# Patient Record
Sex: FEMALE | Race: WHITE | NOT HISPANIC OR LATINO | Employment: OTHER | URBAN - METROPOLITAN AREA
[De-identification: names, ages, dates, MRNs, and addresses within clinical notes are randomized per-mention and may not be internally consistent; named-entity substitution may affect disease eponyms.]

---

## 2023-06-14 LAB
ALANINE AMINOTRANSFERASE (SGPT) (U/L) IN SER/PLAS: 12 U/L (ref 7–45)
ALBUMIN (G/DL) IN SER/PLAS: 4.2 G/DL (ref 3.4–5)
ALBUMIN (MG/L) IN URINE: 13.9 MG/L
ALBUMIN/CREATININE (UG/MG) IN URINE: 16.6 UG/MG CRT (ref 0–30)
ALKALINE PHOSPHATASE (U/L) IN SER/PLAS: 53 U/L (ref 33–136)
ANION GAP IN SER/PLAS: 17 MMOL/L (ref 10–20)
APPEARANCE, URINE: CLEAR
ASPARTATE AMINOTRANSFERASE (SGOT) (U/L) IN SER/PLAS: 14 U/L (ref 9–39)
BASOPHILS (10*3/UL) IN BLOOD BY AUTOMATED COUNT: 0.04 X10E9/L (ref 0–0.1)
BASOPHILS/100 LEUKOCYTES IN BLOOD BY AUTOMATED COUNT: 0.5 % (ref 0–2)
BILIRUBIN TOTAL (MG/DL) IN SER/PLAS: 0.4 MG/DL (ref 0–1.2)
BILIRUBIN, URINE: NEGATIVE
BLOOD, URINE: NEGATIVE
CALCIDIOL (25 OH VITAMIN D3) (NG/ML) IN SER/PLAS: 38 NG/ML
CALCIUM (MG/DL) IN SER/PLAS: 9.5 MG/DL (ref 8.6–10.3)
CARBON DIOXIDE, TOTAL (MMOL/L) IN SER/PLAS: 25 MMOL/L (ref 21–32)
CHLORIDE (MMOL/L) IN SER/PLAS: 105 MMOL/L (ref 98–107)
CHOLESTEROL (MG/DL) IN SER/PLAS: 155 MG/DL (ref 0–199)
CHOLESTEROL IN HDL (MG/DL) IN SER/PLAS: 73.1 MG/DL
CHOLESTEROL/HDL RATIO: 2.1
COBALAMIN (VITAMIN B12) (PG/ML) IN SER/PLAS: 1085 PG/ML (ref 211–911)
COLOR, URINE: YELLOW
CREATININE (MG/DL) IN SER/PLAS: 0.96 MG/DL (ref 0.5–1.05)
CREATININE (MG/DL) IN URINE: 83.9 MG/DL (ref 20–320)
CREATININE (MG/DL) IN URINE: 83.9 MG/DL (ref 20–320)
EOSINOPHILS (10*3/UL) IN BLOOD BY AUTOMATED COUNT: 0.18 X10E9/L (ref 0–0.4)
EOSINOPHILS/100 LEUKOCYTES IN BLOOD BY AUTOMATED COUNT: 2.1 % (ref 0–6)
ERYTHROCYTE DISTRIBUTION WIDTH (RATIO) BY AUTOMATED COUNT: 13.7 % (ref 11.5–14.5)
ERYTHROCYTE MEAN CORPUSCULAR HEMOGLOBIN CONCENTRATION (G/DL) BY AUTOMATED: 32.8 G/DL (ref 32–36)
ERYTHROCYTE MEAN CORPUSCULAR VOLUME (FL) BY AUTOMATED COUNT: 94 FL (ref 80–100)
ERYTHROCYTES (10*6/UL) IN BLOOD BY AUTOMATED COUNT: 4.29 X10E12/L (ref 4–5.2)
ESTIMATED AVERAGE GLUCOSE FOR HBA1C: 200 MG/DL
GFR FEMALE: 61 ML/MIN/1.73M2
GLUCOSE (MG/DL) IN SER/PLAS: 152 MG/DL (ref 74–99)
GLUCOSE, URINE: NEGATIVE MG/DL
HEMATOCRIT (%) IN BLOOD BY AUTOMATED COUNT: 40.2 % (ref 36–46)
HEMOGLOBIN (G/DL) IN BLOOD: 13.2 G/DL (ref 12–16)
HEMOGLOBIN A1C/HEMOGLOBIN TOTAL IN BLOOD: 8.6 %
HYALINE CASTS, URINE: ABNORMAL /LPF
IMMATURE GRANULOCYTES/100 LEUKOCYTES IN BLOOD BY AUTOMATED COUNT: 0.1 % (ref 0–0.9)
KETONES, URINE: NEGATIVE MG/DL
LDL: 67 MG/DL (ref 0–99)
LEUKOCYTE ESTERASE, URINE: ABNORMAL
LEUKOCYTES (10*3/UL) IN BLOOD BY AUTOMATED COUNT: 8.7 X10E9/L (ref 4.4–11.3)
LYMPHOCYTES (10*3/UL) IN BLOOD BY AUTOMATED COUNT: 2.22 X10E9/L (ref 0.8–3)
LYMPHOCYTES/100 LEUKOCYTES IN BLOOD BY AUTOMATED COUNT: 25.7 % (ref 13–44)
MAGNESIUM (MG/DL) IN SER/PLAS: 1.91 MG/DL (ref 1.6–2.4)
MONOCYTES (10*3/UL) IN BLOOD BY AUTOMATED COUNT: 0.68 X10E9/L (ref 0.05–0.8)
MONOCYTES/100 LEUKOCYTES IN BLOOD BY AUTOMATED COUNT: 7.9 % (ref 2–10)
MUCUS, URINE: ABNORMAL /LPF
NEUTROPHILS (10*3/UL) IN BLOOD BY AUTOMATED COUNT: 5.52 X10E9/L (ref 1.6–5.5)
NEUTROPHILS/100 LEUKOCYTES IN BLOOD BY AUTOMATED COUNT: 63.7 % (ref 40–80)
NITRITE, URINE: NEGATIVE
PH, URINE: 6 (ref 5–8)
PLATELETS (10*3/UL) IN BLOOD AUTOMATED COUNT: 246 X10E9/L (ref 150–450)
POTASSIUM (MMOL/L) IN SER/PLAS: 4.7 MMOL/L (ref 3.5–5.3)
PROTEIN (MG/DL) IN URINE: 16 MG/DL (ref 5–24)
PROTEIN TOTAL: 7.4 G/DL (ref 6.4–8.2)
PROTEIN, URINE: NEGATIVE MG/DL
PROTEIN/CREATININE (MG/MG) IN URINE: 0.19 MG/MG CREAT (ref 0–0.17)
RBC, URINE: <1 /HPF (ref 0–5)
SODIUM (MMOL/L) IN SER/PLAS: 142 MMOL/L (ref 136–145)
SPECIFIC GRAVITY, URINE: 1.01 (ref 1–1.03)
SQUAMOUS EPITHELIAL CELLS, URINE: 1 /HPF
THYROTROPIN (MIU/L) IN SER/PLAS BY DETECTION LIMIT <= 0.05 MIU/L: 2.62 MIU/L (ref 0.44–3.98)
TRIGLYCERIDE (MG/DL) IN SER/PLAS: 75 MG/DL (ref 0–149)
URATE (MG/DL) IN SER/PLAS: 5.2 MG/DL (ref 2.3–6.7)
UREA NITROGEN (MG/DL) IN SER/PLAS: 22 MG/DL (ref 6–23)
UROBILINOGEN, URINE: <2 MG/DL (ref 0–1.9)
VLDL: 15 MG/DL (ref 0–40)
WBC, URINE: 3 /HPF (ref 0–5)

## 2023-10-18 ENCOUNTER — LAB (OUTPATIENT)
Dept: LAB | Facility: LAB | Age: 78
End: 2023-10-18
Payer: MEDICARE

## 2023-10-18 ENCOUNTER — HOSPITAL ENCOUNTER (OUTPATIENT)
Dept: RADIOLOGY | Facility: HOSPITAL | Age: 78
Discharge: HOME | End: 2023-10-18
Payer: MEDICARE

## 2023-10-18 DIAGNOSIS — M10.9 GOUT, UNSPECIFIED: ICD-10-CM

## 2023-10-18 DIAGNOSIS — E78.5 HYPERLIPIDEMIA, UNSPECIFIED: ICD-10-CM

## 2023-10-18 DIAGNOSIS — D51.9 VITAMIN B12 DEFICIENCY ANEMIA, UNSPECIFIED: ICD-10-CM

## 2023-10-18 DIAGNOSIS — I10 ESSENTIAL (PRIMARY) HYPERTENSION: ICD-10-CM

## 2023-10-18 DIAGNOSIS — E55.9 VITAMIN D DEFICIENCY, UNSPECIFIED: ICD-10-CM

## 2023-10-18 DIAGNOSIS — E78.5 HYPERLIPIDEMIA, UNSPECIFIED: Primary | ICD-10-CM

## 2023-10-18 DIAGNOSIS — E11.65 TYPE 2 DIABETES MELLITUS WITH HYPERGLYCEMIA (MULTI): ICD-10-CM

## 2023-10-18 LAB
25(OH)D3 SERPL-MCNC: 27 NG/ML (ref 30–100)
ALBUMIN SERPL BCP-MCNC: 4.1 G/DL (ref 3.4–5)
ALP SERPL-CCNC: 64 U/L (ref 33–136)
ALT SERPL W P-5'-P-CCNC: 10 U/L (ref 7–45)
ANION GAP SERPL CALC-SCNC: 15 MMOL/L (ref 10–20)
APPEARANCE UR: ABNORMAL
AST SERPL W P-5'-P-CCNC: 11 U/L (ref 9–39)
BACTERIA #/AREA URNS AUTO: ABNORMAL /HPF
BASOPHILS # BLD AUTO: 0.04 X10*3/UL (ref 0–0.1)
BASOPHILS NFR BLD AUTO: 0.4 %
BILIRUB SERPL-MCNC: 0.5 MG/DL (ref 0–1.2)
BILIRUB UR STRIP.AUTO-MCNC: NEGATIVE MG/DL
BUN SERPL-MCNC: 30 MG/DL (ref 6–23)
CALCIUM SERPL-MCNC: 9.6 MG/DL (ref 8.6–10.3)
CHLORIDE SERPL-SCNC: 103 MMOL/L (ref 98–107)
CHOLEST SERPL-MCNC: 125 MG/DL (ref 0–199)
CHOLESTEROL/HDL RATIO: 2.3
CO2 SERPL-SCNC: 26 MMOL/L (ref 21–32)
COLOR UR: YELLOW
CREAT SERPL-MCNC: 1.24 MG/DL (ref 0.5–1.05)
CREAT UR-MCNC: 90.2 MG/DL (ref 20–320)
CREAT UR-MCNC: 90.2 MG/DL (ref 20–320)
EOSINOPHIL # BLD AUTO: 0.21 X10*3/UL (ref 0–0.4)
EOSINOPHIL NFR BLD AUTO: 2.3 %
ERYTHROCYTE [DISTWIDTH] IN BLOOD BY AUTOMATED COUNT: 13.2 % (ref 11.5–14.5)
EST. AVERAGE GLUCOSE BLD GHB EST-MCNC: 183 MG/DL
GFR SERPL CREATININE-BSD FRML MDRD: 45 ML/MIN/1.73M*2
GLUCOSE SERPL-MCNC: 174 MG/DL (ref 74–99)
GLUCOSE UR STRIP.AUTO-MCNC: NEGATIVE MG/DL
HBA1C MFR BLD: 8 %
HCT VFR BLD AUTO: 39.8 % (ref 36–46)
HDLC SERPL-MCNC: 55.4 MG/DL
HGB BLD-MCNC: 12.9 G/DL (ref 12–16)
HYALINE CASTS #/AREA URNS AUTO: ABNORMAL /LPF
IMM GRANULOCYTES # BLD AUTO: 0.02 X10*3/UL (ref 0–0.5)
IMM GRANULOCYTES NFR BLD AUTO: 0.2 % (ref 0–0.9)
KETONES UR STRIP.AUTO-MCNC: NEGATIVE MG/DL
LDLC SERPL CALC-MCNC: 57 MG/DL
LEUKOCYTE ESTERASE UR QL STRIP.AUTO: ABNORMAL
LYMPHOCYTES # BLD AUTO: 2.37 X10*3/UL (ref 0.8–3)
LYMPHOCYTES NFR BLD AUTO: 26 %
MAGNESIUM SERPL-MCNC: 1.62 MG/DL (ref 1.6–2.4)
MCH RBC QN AUTO: 29.9 PG (ref 26–34)
MCHC RBC AUTO-ENTMCNC: 32.4 G/DL (ref 32–36)
MCV RBC AUTO: 92 FL (ref 80–100)
MICROALBUMIN UR-MCNC: 18.2 MG/L
MICROALBUMIN/CREAT UR: 20.2 UG/MG CREAT
MONOCYTES # BLD AUTO: 0.57 X10*3/UL (ref 0.05–0.8)
MONOCYTES NFR BLD AUTO: 6.3 %
MUCOUS THREADS #/AREA URNS AUTO: ABNORMAL /LPF
NEUTROPHILS # BLD AUTO: 5.9 X10*3/UL (ref 1.6–5.5)
NEUTROPHILS NFR BLD AUTO: 64.8 %
NITRITE UR QL STRIP.AUTO: NEGATIVE
NON HDL CHOLESTEROL: 70 MG/DL (ref 0–149)
NRBC BLD-RTO: 0 /100 WBCS (ref 0–0)
PH UR STRIP.AUTO: 5 [PH]
PLATELET # BLD AUTO: 261 X10*3/UL (ref 150–450)
PMV BLD AUTO: 10.8 FL (ref 7.5–11.5)
POTASSIUM SERPL-SCNC: 4.8 MMOL/L (ref 3.5–5.3)
PROT SERPL-MCNC: 7.2 G/DL (ref 6.4–8.2)
PROT UR STRIP.AUTO-MCNC: NEGATIVE MG/DL
PROT UR-ACNC: 23 MG/DL (ref 5–24)
PROT/CREAT UR: 0.25 MG/MG CREAT (ref 0–0.17)
RBC # BLD AUTO: 4.32 X10*6/UL (ref 4–5.2)
RBC # UR STRIP.AUTO: NEGATIVE /UL
RBC #/AREA URNS AUTO: ABNORMAL /HPF
SODIUM SERPL-SCNC: 139 MMOL/L (ref 136–145)
SP GR UR STRIP.AUTO: 1.02
SQUAMOUS #/AREA URNS AUTO: ABNORMAL /HPF
TRIGL SERPL-MCNC: 63 MG/DL (ref 0–149)
TSH SERPL-ACNC: 2.05 MIU/L (ref 0.44–3.98)
URATE SERPL-MCNC: 5.9 MG/DL (ref 2.3–6.7)
UROBILINOGEN UR STRIP.AUTO-MCNC: <2 MG/DL
VIT B12 SERPL-MCNC: 303 PG/ML (ref 211–911)
VLDL: 13 MG/DL (ref 0–40)
WBC # BLD AUTO: 9.1 X10*3/UL (ref 4.4–11.3)
WBC #/AREA URNS AUTO: ABNORMAL /HPF

## 2023-10-18 PROCEDURE — 83735 ASSAY OF MAGNESIUM: CPT

## 2023-10-18 PROCEDURE — 82607 VITAMIN B-12: CPT

## 2023-10-18 PROCEDURE — 71046 X-RAY EXAM CHEST 2 VIEWS: CPT | Performed by: RADIOLOGY

## 2023-10-18 PROCEDURE — 82043 UR ALBUMIN QUANTITATIVE: CPT

## 2023-10-18 PROCEDURE — 80053 COMPREHEN METABOLIC PANEL: CPT

## 2023-10-18 PROCEDURE — 84156 ASSAY OF PROTEIN URINE: CPT

## 2023-10-18 PROCEDURE — 80061 LIPID PANEL: CPT

## 2023-10-18 PROCEDURE — 85025 COMPLETE CBC W/AUTO DIFF WBC: CPT

## 2023-10-18 PROCEDURE — 84443 ASSAY THYROID STIM HORMONE: CPT

## 2023-10-18 PROCEDURE — 82570 ASSAY OF URINE CREATININE: CPT

## 2023-10-18 PROCEDURE — 81001 URINALYSIS AUTO W/SCOPE: CPT

## 2023-10-18 PROCEDURE — 36415 COLL VENOUS BLD VENIPUNCTURE: CPT

## 2023-10-18 PROCEDURE — 83036 HEMOGLOBIN GLYCOSYLATED A1C: CPT

## 2023-10-18 PROCEDURE — 82306 VITAMIN D 25 HYDROXY: CPT

## 2023-10-18 PROCEDURE — 71046 X-RAY EXAM CHEST 2 VIEWS: CPT | Mod: FY

## 2023-10-18 PROCEDURE — 84550 ASSAY OF BLOOD/URIC ACID: CPT

## 2023-12-28 ENCOUNTER — OFFICE VISIT (OUTPATIENT)
Dept: ORTHOPEDIC SURGERY | Facility: CLINIC | Age: 78
End: 2023-12-28
Payer: MEDICARE

## 2023-12-28 ENCOUNTER — ANCILLARY PROCEDURE (OUTPATIENT)
Dept: RADIOLOGY | Facility: CLINIC | Age: 78
End: 2023-12-28
Payer: MEDICARE

## 2023-12-28 DIAGNOSIS — S40.011A CONTUSION OF RIGHT SHOULDER, INITIAL ENCOUNTER: ICD-10-CM

## 2023-12-28 DIAGNOSIS — S43.401A SPRAIN OF RIGHT SHOULDER, UNSPECIFIED SHOULDER SPRAIN TYPE, INITIAL ENCOUNTER: ICD-10-CM

## 2023-12-28 DIAGNOSIS — M25.511 RIGHT SHOULDER PAIN, UNSPECIFIED CHRONICITY: ICD-10-CM

## 2023-12-28 PROCEDURE — 1159F MED LIST DOCD IN RCRD: CPT | Performed by: FAMILY MEDICINE

## 2023-12-28 PROCEDURE — 99214 OFFICE O/P EST MOD 30 MIN: CPT | Performed by: FAMILY MEDICINE

## 2023-12-28 PROCEDURE — 72040 X-RAY EXAM NECK SPINE 2-3 VW: CPT

## 2023-12-28 PROCEDURE — 73030 X-RAY EXAM OF SHOULDER: CPT | Mod: RT

## 2023-12-28 PROCEDURE — 1160F RVW MEDS BY RX/DR IN RCRD: CPT | Performed by: FAMILY MEDICINE

## 2023-12-28 PROCEDURE — 73030 X-RAY EXAM OF SHOULDER: CPT | Mod: RIGHT SIDE | Performed by: FAMILY MEDICINE

## 2023-12-28 PROCEDURE — L3670 SO ACRO/CLAV CAN WEB PRE OTS: HCPCS | Performed by: FAMILY MEDICINE

## 2023-12-28 PROCEDURE — 72040 X-RAY EXAM NECK SPINE 2-3 VW: CPT | Performed by: FAMILY MEDICINE

## 2023-12-28 RX ORDER — CYCLOBENZAPRINE HCL 5 MG
5 TABLET ORAL NIGHTLY
Qty: 10 TABLET | Refills: 0 | Status: SHIPPED | OUTPATIENT
Start: 2023-12-28 | End: 2024-01-07

## 2023-12-28 NOTE — PROGRESS NOTES
Acute Injury New Patient Visit    CC:   Chief Complaint   Patient presents with    Right Shoulder - Pain     Ongoing issues  EST with Dr. ROSENDO Griffith  X rays 05/2023       HPI: Sarah is a 78 y.o.female who presents today with new complaints of Pain discomfort to the right shoulder and the right side of her neck.  She gets established with Dr. Semaj Griffith presents here today after losing her balance and falling.  She states that she has pain to the upper posterior aspect of the shoulder.  She has a little bit of lateral sided neck pain she states a history of prior cervical fusion.  In the past she is also tolerated gel injections to the knees.  She asked about possibility of repeating the injections.  She denies any numbness tingling or burning here today.  She states that somehow she had lost her balance on [place] required the assistance and help of others to get her up and moving.  She is very hard nose and hardheaded she did not go to the emergency department.  She states that she also may have hit her head.  She does not feel that she ever lost consciousness as she did have to call for help where she was able to receive help within 30 minutes his family and friends.  I were able to help her up to her feet.  She states during the entirety of the events that she feels that she has injured her right shoulder upper back and also asked about her bilateral knees.        Review of Systems   GENERAL: Negative for malaise, significant weight loss, fever  MUSCULOSKELETAL: See HPI  NEURO: Negative for numbness / tingling     Past Medical History  Past Medical History:   Diagnosis Date    Anesthesia of skin     Numbness and tingling    Personal history of diseases of the blood and blood-forming organs and certain disorders involving the immune mechanism     History of bleeding disorder    Personal history of malignant neoplasm, unspecified     History of malignant neoplasm    Personal history of other diseases of  the circulatory system     History of hypertension    Personal history of other diseases of the musculoskeletal system and connective tissue     History of arthritis    Personal history of other endocrine, nutritional and metabolic disease     History of diabetes mellitus    Personal history of other specified conditions     History of balance disorder    Unspecified abnormalities of breathing     Breathing problem    Unspecified disorder of ear, unspecified ear     Ear, nose and throat disorder       Medication review  Medication Documentation Review Audit    **Prior to Admission medications have not yet been reviewed**         Allergies  No Known Allergies    Social History  Social History     Socioeconomic History    Marital status:      Spouse name: Not on file    Number of children: Not on file    Years of education: Not on file    Highest education level: Not on file   Occupational History    Not on file   Tobacco Use    Smoking status: Not on file    Smokeless tobacco: Not on file   Substance and Sexual Activity    Alcohol use: Not on file    Drug use: Not on file    Sexual activity: Not on file   Other Topics Concern    Not on file   Social History Narrative    Not on file     Social Determinants of Health     Financial Resource Strain: Not on file   Food Insecurity: Not on file   Transportation Needs: Not on file   Physical Activity: Not on file   Stress: Not on file   Social Connections: Not on file   Intimate Partner Violence: Not on file   Housing Stability: Not on file       Surgical History  Past Surgical History:   Procedure Laterality Date    OTHER SURGICAL HISTORY  07/20/2022    Cervical surgery    OTHER SURGICAL HISTORY  07/20/2022    Back surgery    OTHER SURGICAL HISTORY  07/20/2022    Knee arthroscopy       Physical Exam:  GENERAL:  Patient is awake, alert, and oriented to person place and time.  Patient appears well nourished and well kept.  Affect Calm, Not Acutely Distressed.  HEENT:   Normocephalic, Atraumatic, EOMI  CARDIOVASCULAR:  Hemodynamically stable.  RESPIRATORY:  Normal respirations with unlabored breathing.  NEURO: Gross sensation intact to the upper extremities bilaterally.  Extremity: Head and neck exam demonstrates no tenderness down the midline mild right paracervical spasms mild trapezius tenderness mild scapular pain on the right none on the left.  She has limited range of motion which she states is normal for her.  Forward flexion to 90 degrees lateral abduction to 75  strength equal symmetric and intact she has no elbow pain full flexion extension normal pronation supination.  Positive Neer's Garrido and Perkins's.  Contralateral limb is examined noted to be equal.  Mild crepitus about the shoulder noted she has positive tenderness palpation at the mid and distal aspect of the clavicle and AC joint.  There is no clavicle tenderness on the left.      Diagnostics: X-rays today demonstrate no obvious fracture or dislocation        Procedure: None  Procedures    Assessment:   Problem List Items Addressed This Visit    None  Visit Diagnoses       Right shoulder pain, unspecified chronicity        Relevant Orders    XR shoulder right 2+ views    XR cervical spine 2-3 views    Sprain of right shoulder, unspecified shoulder sprain type, initial encounter        Relevant Medications    cyclobenzaprine (Flexeril) 5 mg tablet    Other Relevant Orders    Sling    Contusion of right shoulder, initial encounter        Relevant Medications    cyclobenzaprine (Flexeril) 5 mg tablet    Other Relevant Orders    Sling             Plan: At this time we will offer the patient a sling for her right shoulder and upper back.  Discussed with the patient there could be a very small nondisplaced clavicle fracture.  We will repeat x-rays 2 views of the right clavicle upon her return in 2 to 3 weeks.  Will also provide her with a short course of a muscle relaxer to assist with the spasms and muscle  tenderness.  May need to consider physical therapy going forward.  Will allow for some time for things to settle down and cool off.  Additionally we will address her bilateral knee pain chronic osteoarthritis at follow-up visit.  She is considering repeating gel injections and also asked about the possibility of injections to her shoulder.  We will hold off on oral steroid here today in the event that there is a small fracture.  Orders Placed This Encounter    Sling    XR shoulder right 2+ views    XR cervical spine 2-3 views    cyclobenzaprine (Flexeril) 5 mg tablet      At the conclusion of the visit there were no further questions by the patient/family regarding their plan of care.  Patient was instructed to call or return with any issues, questions, or concerns regarding their injury and/or treatment plan described above.     12/28/23 at 5:48 PM - Cole C Budinsky, MD    Office: [phone]    This note was prepared using voice recognition software.  The details of this note are correct and have been reviewed, and corrected to the best of my ability.  Some grammatical errors may persist related to the Dragon software.

## 2024-01-22 ENCOUNTER — HOSPITAL ENCOUNTER (OUTPATIENT)
Dept: RADIOLOGY | Facility: CLINIC | Age: 79
Discharge: HOME | End: 2024-01-22
Payer: COMMERCIAL

## 2024-01-22 ENCOUNTER — OFFICE VISIT (OUTPATIENT)
Dept: ORTHOPEDIC SURGERY | Facility: CLINIC | Age: 79
End: 2024-01-22
Payer: COMMERCIAL

## 2024-01-22 DIAGNOSIS — M89.8X1 PAIN OF RIGHT CLAVICLE: ICD-10-CM

## 2024-01-22 PROCEDURE — 73000 X-RAY EXAM OF COLLAR BONE: CPT | Mod: RT

## 2024-01-22 PROCEDURE — 2500000004 HC RX 250 GENERAL PHARMACY W/ HCPCS (ALT 636 FOR OP/ED): Performed by: FAMILY MEDICINE

## 2024-01-22 PROCEDURE — 1159F MED LIST DOCD IN RCRD: CPT | Performed by: FAMILY MEDICINE

## 2024-01-22 PROCEDURE — 1160F RVW MEDS BY RX/DR IN RCRD: CPT | Performed by: FAMILY MEDICINE

## 2024-01-22 PROCEDURE — 73000 X-RAY EXAM OF COLLAR BONE: CPT | Mod: RIGHT SIDE | Performed by: RADIOLOGY

## 2024-01-22 PROCEDURE — 99213 OFFICE O/P EST LOW 20 MIN: CPT | Performed by: FAMILY MEDICINE

## 2024-01-22 PROCEDURE — 20606 DRAIN/INJ JOINT/BURSA W/US: CPT | Performed by: FAMILY MEDICINE

## 2024-01-22 PROCEDURE — 2500000005 HC RX 250 GENERAL PHARMACY W/O HCPCS: Performed by: FAMILY MEDICINE

## 2024-01-22 RX ORDER — TRIAMCINOLONE ACETONIDE 40 MG/ML
20 INJECTION, SUSPENSION INTRA-ARTICULAR; INTRAMUSCULAR
Status: COMPLETED | OUTPATIENT
Start: 2024-01-22 | End: 2024-01-22

## 2024-01-22 RX ORDER — LIDOCAINE HYDROCHLORIDE 10 MG/ML
0.5 INJECTION INFILTRATION; PERINEURAL
Status: COMPLETED | OUTPATIENT
Start: 2024-01-22 | End: 2024-01-22

## 2024-01-22 RX ADMIN — LIDOCAINE HYDROCHLORIDE 0.5 ML: 10 INJECTION, SOLUTION INFILTRATION; PERINEURAL at 12:37

## 2024-01-22 RX ADMIN — TRIAMCINOLONE ACETONIDE 20 MG: 40 INJECTION, SUSPENSION INTRA-ARTICULAR; INTRAMUSCULAR at 12:37

## 2024-01-22 NOTE — PROGRESS NOTES
Established Patient Follow-Up Visit    CC:   Chief Complaint   Patient presents with    Left Shoulder - Follow-up     Sprain/contusion  Repeat xrays today       HPI:  Sarah is a 78 y.o. female returns here today for follow-up visit regarding: Right shoulder pain status post fall.  She presents here today would like to try an injection to her AC joint.  She is also ready to go forward with bilateral knee gel injections.  She also asked about the possibility of getting a gel shot to her right shoulder.  At this time she is not interested in getting a shoulder replacement.  Patient does have some mild subjective complaints of weakness to the right upper extremity and dropping things however denies any numbness tingling or burning.          REVIEW OF SYSTEMS:  GENERAL: Negative for malaise, significant weight loss, fever  MUSCULOSKELETAL: See HPI  NEURO: Negative for numbness / tingling       PHYSICAL EXAM:  -Neuro: Gross sensation intact to the upper extremities bilaterally.  -Extremity: Right AC joint tender to palpation no obvious crepitus limited range of motion with forward flexion and lateral abduction about the shoulder with obvious crepitus about the true glenohumeral joint.  Distal pulses and sensation are intact.   strength equal and symmetric 5 out of 5 strength with wrist flexion wrist extension biceps and triceps flexion extension.  Bilateral knee exam: The affected knee was examined and inspected and was tender to the touch along the medial and lateral aspect with catching, locking or mechanical symptoms. The skin was intact without breakdown or open wound. Old incisions if present were healed. There was a mild Johanny exam seen with some evidence of instability & weakness in the collateral ligaments with varus/valgus stress & laxity in the anterior or posterior planes. There was a negative Lachman´s test, pivot shift test and posterior drawer sign with no foot drop, numbness or tingling.  Sensation, reflexes and pulses in the foot and ankle are preserved. There was an effusion. Range of motion showed good straight leg raise with flexion to 135 degrees and extension to 0 degrees. The patient had the ability to bear weight, but with discomfort. The patient´s gait was antalgic secondary to the discomfort.    IMAGING: Repeat right clavicle films demonstrate AC arthrosis without presence for fracture      PROCEDURE: US Guided right AC Joint Injection:    Before aspiration/injection, the risks  of this procedure including but not limited to;  infection, local skin irritation, skin atrophy, calcification, continued pain or discomfort, elevated blood sugar, burning, failure to relieve pain, possible late infection were all discussed with the patient.  The patient verbalized understanding and consented to the procedure.     After informed consent was provided, patient identification was confirmed, and allergies were verified, the patient was appropriately positioned. The patient´s [Right] AC Joint was identified via Ultrasound.    The site was marked and time-out performed.  The injection site was prepped in the usual sterile manner to provide a sterile environment. The skin was anesthetized with ethyl chloride spray. The injection was performed with standard technique. Using ultrasound guidance, with the AC Joint visualized, a 25G needle was advanced from a [lateral to medial] approach to a position adjacent to the joint.      A [1.0]cc mixture of [0.5 cc's of [Kenalog] and [0.5 ] cc´s of 1% lidocaine without epinephrine was injected into to the AC Joint.  The needle was withdrawn and the puncture site was secured with a Band-Aid. The patient tolerated the procedure well without complication.      Standard post-procedure care was explained and return precautions were given prior to discharge.  Post-procedure discomfort can be alleviated with additional medication, ice, elevation, and rest over the first 24  hours as recommended.      M Inj/Asp: R acromioclavicular on 1/22/2024 12:37 PM  Indications: pain and joint swelling  Details: 25 G needle, ultrasound-guided  Medications: 20 mg triamcinolone acetonide 40 mg/mL; 0.5 mL lidocaine 10 mg/mL (1 %)  Outcome: tolerated well, no immediate complications  Procedure, treatment alternatives, risks and benefits explained, specific risks discussed. Consent was given by the patient. Immediately prior to procedure a time out was called to verify the correct patient, procedure, equipment, support staff and site/side marked as required. Patient was prepped and draped in the usual sterile fashion.            ASSESSMENT:   Follow-up visit for:  Problem List Items Addressed This Visit    None  Visit Diagnoses       Pain of right clavicle        Relevant Orders    XR clavicle right    Point of Care Ultrasound (Completed)             PLAN: Patient received and tolerated the injection well.  We will submit prior authorization for bilateral knee osteoarthritis injections with gel shots.  Patient was also interested in considering a gel injection to the right shoulder.  At this time it is unlikely to be approved by insurance so we will need to revisit this and/or consider doing a courtesy or cath based joint injection to the right shoulder.  Will discuss this further at length at follow-up.  In conclusion, this patient has osteoarthritis of the knee which is symptomatic.  This is causing significant morning stiffness lasting over an hour.  The patient has popping clicking and grinding in the knee with range of motion that is decreased and gets worse with prolonged standing or going up and down stairs.  This affects functional activities and activities of daily living.  There is radiographic evidence of osteoarthritis with joint space narrowing and marginal osteophyte formation.  This patient has also failed nonpharmacologic treatment for osteoarthritis including attempts at weight loss,  and a home exercise program with or without physical therapy.  The patient has also failed pharmacologic treatments for osteoarthritis including over-the-counter analgesics, anti-inflammatory medication as well as injectable treatments.  For these reasons I feel the patient is a candidate for Visco supplementation injections of the knee.  Orders Placed This Encounter    M Inj/Asp    XR clavicle right    Point of Care Ultrasound           At the conclusion of the visit there were no further questions by the patient/family regarding their plan of care.  Patient was instructed to call or return with any issues, questions, or concerns regarding their injury and/or treatment plan described above.     01/22/24 at 12:38 PM - Cole C Budinsky, MD    Office: [phone]    This note was prepared using voice recognition software.  The details of this note are correct and have been reviewed, and corrected to the best of my ability.  Some grammatical errors may persist related to the Dragon software.

## 2024-02-28 ENCOUNTER — OFFICE VISIT (OUTPATIENT)
Dept: ORTHOPEDIC SURGERY | Facility: CLINIC | Age: 79
End: 2024-02-28
Payer: COMMERCIAL

## 2024-02-28 DIAGNOSIS — M17.0 BILATERAL PRIMARY OSTEOARTHRITIS OF KNEE: Primary | ICD-10-CM

## 2024-02-28 PROCEDURE — 20611 DRAIN/INJ JOINT/BURSA W/US: CPT | Mod: 50 | Performed by: FAMILY MEDICINE

## 2024-02-28 PROCEDURE — 2500000004 HC RX 250 GENERAL PHARMACY W/ HCPCS (ALT 636 FOR OP/ED): Mod: JZ | Performed by: FAMILY MEDICINE

## 2024-02-28 PROCEDURE — 1159F MED LIST DOCD IN RCRD: CPT | Performed by: FAMILY MEDICINE

## 2024-02-28 PROCEDURE — 1160F RVW MEDS BY RX/DR IN RCRD: CPT | Performed by: FAMILY MEDICINE

## 2024-02-28 PROCEDURE — 99024 POSTOP FOLLOW-UP VISIT: CPT | Performed by: FAMILY MEDICINE

## 2024-02-28 RX ADMIN — Medication 16 MG: at 14:05

## 2024-02-28 NOTE — PROGRESS NOTES
Patient ID: Sarah Manzo is a 78 y.o. female.    L Inj/Asp: bilateral knee on 2/28/2024 2:05 PM  Indications: pain  Details: 22 G needle, ultrasound-guided superolateral approach  Medications (Right): 16 mg Synvisc 3 series  Medications (Left): 16 mg Synvisc 3 series  Outcome: tolerated well, no immediate complications  Procedure, treatment alternatives, risks and benefits explained, specific risks discussed. Immediately prior to procedure a time out was called to verify the correct patient, procedure, equipment, support staff and site/side marked as required. Patient was prepped and draped in the usual sterile fashion.           Patient presents here today for the first of 3 Synvisc injections for the bilateral knees.  Routine postprocedural precautions were given.  Patient will return for second injection next week.    Patient was also interested in considering doing gel injections for her right shoulder going forward.  She asked about this possibility of us submitting to her new insurance once that kicks in after 1 March.    At the conclusion of the visit there were no further questions by the patient/family regarding their plan of care.  Patient was instructed to call or return with any issues, questions, or concerns regarding their injury and/or treatment plan described above.    This note was prepared using voice recognition software.  The details of this note are correct and have been reviewed, and corrected to the best of my ability.  Some grammatical errors may persist related to the Dragon software     Cole C. Budinsky, MD  Office: [phone]

## 2024-03-06 ENCOUNTER — OFFICE VISIT (OUTPATIENT)
Dept: ORTHOPEDIC SURGERY | Facility: CLINIC | Age: 79
End: 2024-03-06
Payer: MEDICARE

## 2024-03-06 DIAGNOSIS — M25.562 PAIN IN BOTH KNEES, UNSPECIFIED CHRONICITY: ICD-10-CM

## 2024-03-06 DIAGNOSIS — M25.561 PAIN IN BOTH KNEES, UNSPECIFIED CHRONICITY: ICD-10-CM

## 2024-03-06 PROCEDURE — 1160F RVW MEDS BY RX/DR IN RCRD: CPT | Performed by: FAMILY MEDICINE

## 2024-03-06 PROCEDURE — 20611 DRAIN/INJ JOINT/BURSA W/US: CPT | Mod: 50 | Performed by: FAMILY MEDICINE

## 2024-03-06 PROCEDURE — 1159F MED LIST DOCD IN RCRD: CPT | Performed by: FAMILY MEDICINE

## 2024-03-06 PROCEDURE — 2500000004 HC RX 250 GENERAL PHARMACY W/ HCPCS (ALT 636 FOR OP/ED): Mod: JZ | Performed by: FAMILY MEDICINE

## 2024-03-06 RX ORDER — CELECOXIB 200 MG/1
200 CAPSULE ORAL DAILY
COMMUNITY
Start: 2024-02-18

## 2024-03-06 RX ORDER — AMLODIPINE BESYLATE 10 MG/1
10 TABLET ORAL DAILY
COMMUNITY
Start: 2024-02-18

## 2024-03-06 RX ORDER — DICLOFENAC SODIUM 10 MG/G
GEL TOPICAL
COMMUNITY
Start: 2024-02-18

## 2024-03-06 RX ADMIN — Medication 16 MG: at 14:13

## 2024-03-06 NOTE — PROGRESS NOTES
Patient ID: Sarah Manzo is a 78 y.o. female.    L Inj/Asp: bilateral knee on 3/6/2024 2:13 PM  Indications: pain and joint swelling  Details: 22 G needle, ultrasound-guided superolateral approach  Medications (Right): 16 mg hylan 16 mg/2 mL  Medications (Left): 16 mg hylan 16 mg/2 mL  Outcome: tolerated well, no immediate complications  Procedure, treatment alternatives, risks and benefits explained, specific risks discussed. Consent was given by the patient. Immediately prior to procedure a time out was called to verify the correct patient, procedure, equipment, support staff and site/side marked as required. Patient was prepped and draped in the usual sterile fashion.         Patient returns here today for second of third Synvisc injections to the bilateral knees for OA.  She also asks if we are able to submit a gel injection for her right shoulder for shoulder osteoarthritis.    Patient tolerated the injection well we will see her back in 1 week for her third and final injection.  If she was approved and authorized for the shoulder gel injection we can proceed forward with the shoulder injection.    At the conclusion of the visit there were no further questions by the patient/family regarding their plan of care.  Patient was instructed to call or return with any issues, questions, or concerns regarding their injury and/or treatment plan described above.    This note was prepared using voice recognition software.  The details of this note are correct and have been reviewed, and corrected to the best of my ability.  Some grammatical errors may persist related to the Dragon software     Cole C. Budinsky, MD  Office: [phone]

## 2024-03-13 ENCOUNTER — HOSPITAL ENCOUNTER (OUTPATIENT)
Dept: RADIOLOGY | Facility: CLINIC | Age: 79
Discharge: HOME | End: 2024-03-13
Payer: MEDICARE

## 2024-03-13 ENCOUNTER — OFFICE VISIT (OUTPATIENT)
Dept: ORTHOPEDIC SURGERY | Facility: CLINIC | Age: 79
End: 2024-03-13
Payer: MEDICARE

## 2024-03-13 DIAGNOSIS — M17.0 BILATERAL PRIMARY OSTEOARTHRITIS OF KNEE: ICD-10-CM

## 2024-03-13 DIAGNOSIS — M79.89 LIMB SWELLING: ICD-10-CM

## 2024-03-13 DIAGNOSIS — M19.019 GLENOHUMERAL ARTHRITIS: Primary | ICD-10-CM

## 2024-03-13 PROCEDURE — 2500000004 HC RX 250 GENERAL PHARMACY W/ HCPCS (ALT 636 FOR OP/ED): Mod: JZ | Performed by: FAMILY MEDICINE

## 2024-03-13 PROCEDURE — 20611 DRAIN/INJ JOINT/BURSA W/US: CPT | Mod: 50 | Performed by: FAMILY MEDICINE

## 2024-03-13 PROCEDURE — 20611 DRAIN/INJ JOINT/BURSA W/US: CPT | Mod: RT | Performed by: FAMILY MEDICINE

## 2024-03-13 PROCEDURE — 93970 EXTREMITY STUDY: CPT

## 2024-03-13 PROCEDURE — 93970 EXTREMITY STUDY: CPT | Performed by: RADIOLOGY

## 2024-03-13 PROCEDURE — 99024 POSTOP FOLLOW-UP VISIT: CPT | Performed by: FAMILY MEDICINE

## 2024-03-13 PROCEDURE — 1159F MED LIST DOCD IN RCRD: CPT | Performed by: FAMILY MEDICINE

## 2024-03-13 PROCEDURE — 1160F RVW MEDS BY RX/DR IN RCRD: CPT | Performed by: FAMILY MEDICINE

## 2024-03-13 RX ADMIN — Medication 16 MG: at 17:21

## 2024-03-13 RX ADMIN — Medication 60 MG: at 17:24

## 2024-03-13 NOTE — PROGRESS NOTES
Patient ID: Sarah Manzo is a 78 y.o. female.    L Inj/Asp: bilateral knee on 3/13/2024 5:21 PM  Indications: pain  Details: 22 G needle, ultrasound-guided superolateral approach  Medications (Right): 16 mg hylan 16 mg/2 mL  Medications (Left): 16 mg hylan 16 mg/2 mL  Outcome: tolerated well, no immediate complications  Procedure, treatment alternatives, risks and benefits explained, specific risks discussed. Immediately prior to procedure a time out was called to verify the correct patient, procedure, equipment, support staff and site/side marked as required. Patient was prepped and draped in the usual sterile fashion.       L Inj/Asp: R glenohumeral on 3/13/2024 5:24 PM  Indications: pain  Details: 22 G needle, ultrasound-guided posterior approach  Medications: 60 mg sodium hyaluronate 60 mg/3 mL  Outcome: tolerated well, no immediate complications  Procedure, treatment alternatives, risks and benefits explained, specific risks discussed. Consent was given by the patient. Immediately prior to procedure a time out was called to verify the correct patient, procedure, equipment, support staff and site/side marked as required. Patient was prepped and draped in the usual sterile fashion.         US Guided bilateral knee Injection:    Before/injection, the risks  of this procedure including but not limited to;  infection, local skin irritation, skin atrophy, calcification, continued pain or discomfort, elevated blood sugar, burning, failure to relieve pain, possible late infection were all discussed with the patient.  The patient verbalized understanding and consented to the procedure.     After informed consent was provided, patient identification was confirmed, and allergies were verified, the patient was appropriately positioned. The patient´s [Left and right] Knee was evaluated in both the short and long axis via ultrasound to identify the intraarticular joint space. An effusion [was/was not] present.  The site was  marked and time-out performed.      The injection site was prepped in the usual sterile manner to provide a sterile environment. The skin was anesthetized with ethyl chloride spray. The aspiration/injection was performed with standard technique. A 22G needle was passed through the skin into the joint space via the superolateral approach with direct ultrasound guidance.  The entire vial of Synvisc (16mg/2ml) was instilled into the joint space, 1 each bilaterally.      The needle was withdrawn and the puncture site was secured with a Band-Aid. The patient tolerated the procedure well without complication.     Post-procedure discomfort can be alleviated with additional medication, ice, elevation, and rest over the first 24 hours as recommended.  US Guided right intra-articular Shoulder Injection:    Before aspiration/injection, the risks  of this procedure including but not limited to;  infection, local skin irritation, skin atrophy, calcification, continued pain or discomfort, elevated blood sugar, burning, failure to relieve pain, possible late infection were all discussed with the patient.  The patient verbalized understanding and consented to the procedure.     After informed consent was provided, patient identification was confirmed, and allergies were verified, the patient was appropriately positioned. The patient´s [right] shoulder was evaluated via ultrasound to identify the posterior glenohumeral joint space.    The site was marked and time-out performed.  The injection site was prepped in the usual sterile manner to provide a sterile environment. The skin was anesthetized with ethyl chloride spray. The aspiration/injection was performed with standard technique with a 22G spinal needle passed through the skin into the joint space under direct ultrasound guidance via sterile precautions. Next, [Durolane (60mg/3ml)] was instilled into the joint space.      The needle was withdrawn and the puncture site was  secured with a Band-Aid. The patient tolerated the procedure well without complication.     Post-procedure discomfort can be alleviated with additional medication, ice, elevation, and rest over the first 24 hours as recommended.      Patient also complains of bilateral calf swelling.  Patient with a history of a superficial DVT in the past the same affected left lower extremity.  She has dependent 1-2+ pitting edema this may be cardiac in nature however out of an abundance of caution we will offer her a stat DVT ultrasound.    Patient tolerated both knee injections and shoulder injection here today.  Will see her back in 3 months for repeat evaluation.    At the conclusion of the visit there were no further questions by the patient/family regarding their plan of care.  Patient was instructed to call or return with any issues, questions, or concerns regarding their injury and/or treatment plan described above.    This note was prepared using voice recognition software.  The details of this note are correct and have been reviewed, and corrected to the best of my ability.  Some grammatical errors may persist related to the Dragon software     Cole C. Budinsky, MD  Office: [phone]

## 2024-03-14 ENCOUNTER — TELEPHONE (OUTPATIENT)
Dept: ORTHOPEDIC SURGERY | Facility: CLINIC | Age: 79
End: 2024-03-14
Payer: MEDICARE

## 2024-03-14 NOTE — TELEPHONE ENCOUNTER
Patient called and said she was seen yesterday and she received injections in her knees stated she had swelling in both of her lower extremities Dr. Budinsky ordered a bilateral venous duplex stat, patient had it done.  Patient called today asking what the results were and that she was feeling dizzy and lightheaded today.  I went to Dr. Budinsky and asked him what recommendations she he had for the patient.  I let him know that the ultrasound was negative, he said the swelling is probably nothing to do with the gel injections and that I could tell the patient that the ultrasound was negative but she needs to go to the hospital or to her family doctor due to the swelling in her lower extremities and the dizziness.  I talked to the patient and made her aware of his recommendations, patient expressed understanding.

## 2025-02-03 ENCOUNTER — TELEPHONE (OUTPATIENT)
Dept: PRIMARY CARE | Facility: CLINIC | Age: 80
End: 2025-02-03
Payer: COMMERCIAL

## 2025-02-03 DIAGNOSIS — I10 PRIMARY HYPERTENSION: Primary | ICD-10-CM

## 2025-02-03 RX ORDER — AMLODIPINE BESYLATE 10 MG/1
10 TABLET ORAL DAILY
Qty: 30 TABLET | Refills: 0 | Status: SHIPPED | OUTPATIENT
Start: 2025-02-03

## 2025-02-03 NOTE — TELEPHONE ENCOUNTER
----- Message from Jessica Fernandes sent at 2/3/2025  2:17 PM EST -----  Please call patient and let her know that I did send her a 30-day supply of her amlodipine so that she does not run out before she sees me.  Thanks!

## 2025-02-03 NOTE — TELEPHONE ENCOUNTER
Pt left message stating she has run out of medication. Pt has appt with you on the 11th pt was concerned and wanted to know if is okay to refill or f is okay to stay out of it for a week until pt sees you.     Request sent to you

## 2025-02-04 ENCOUNTER — APPOINTMENT (OUTPATIENT)
Dept: PRIMARY CARE | Facility: CLINIC | Age: 80
End: 2025-02-04

## 2025-02-11 ENCOUNTER — APPOINTMENT (OUTPATIENT)
Dept: PRIMARY CARE | Facility: CLINIC | Age: 80
End: 2025-02-11
Payer: COMMERCIAL

## 2025-02-11 DIAGNOSIS — M19.90 OSTEOARTHRITIS, UNSPECIFIED OSTEOARTHRITIS TYPE, UNSPECIFIED SITE: ICD-10-CM

## 2025-02-11 DIAGNOSIS — M19.019 SHOULDER ARTHRITIS: ICD-10-CM

## 2025-02-11 DIAGNOSIS — E55.9 VITAMIN D DEFICIENCY: ICD-10-CM

## 2025-02-11 DIAGNOSIS — I10 PRIMARY HYPERTENSION: Primary | ICD-10-CM

## 2025-02-11 DIAGNOSIS — E11.40 TYPE 2 DIABETES MELLITUS WITH DIABETIC NEUROPATHY, WITHOUT LONG-TERM CURRENT USE OF INSULIN: ICD-10-CM

## 2025-02-11 DIAGNOSIS — E53.8 VITAMIN B12 DEFICIENCY: ICD-10-CM

## 2025-02-11 DIAGNOSIS — Z86.718 HISTORY OF DVT (DEEP VEIN THROMBOSIS): ICD-10-CM

## 2025-02-11 DIAGNOSIS — E66.811 CLASS 1 OBESITY DUE TO EXCESS CALORIES WITH SERIOUS COMORBIDITY AND BODY MASS INDEX (BMI) OF 33.0 TO 33.9 IN ADULT: ICD-10-CM

## 2025-02-11 DIAGNOSIS — J30.9 ALLERGIC RHINITIS, UNSPECIFIED SEASONALITY, UNSPECIFIED TRIGGER: ICD-10-CM

## 2025-02-11 DIAGNOSIS — E66.09 CLASS 1 OBESITY DUE TO EXCESS CALORIES WITH SERIOUS COMORBIDITY AND BODY MASS INDEX (BMI) OF 33.0 TO 33.9 IN ADULT: ICD-10-CM

## 2025-02-11 DIAGNOSIS — Z85.42 HISTORY OF UTERINE CANCER: ICD-10-CM

## 2025-02-11 DIAGNOSIS — E78.2 MIXED HYPERLIPIDEMIA: ICD-10-CM

## 2025-02-11 DIAGNOSIS — M17.10 ARTHRITIS OF KNEE: ICD-10-CM

## 2025-02-11 DIAGNOSIS — K59.04 CHRONIC IDIOPATHIC CONSTIPATION: ICD-10-CM

## 2025-02-11 DIAGNOSIS — R60.0 LEG EDEMA: ICD-10-CM

## 2025-02-11 DIAGNOSIS — Z90.710 S/P HYSTERECTOMY: ICD-10-CM

## 2025-02-11 PROBLEM — M10.9 GOUT: Status: RESOLVED | Noted: 2023-10-18 | Resolved: 2025-02-11

## 2025-02-11 PROBLEM — M10.9 GOUT: Status: ACTIVE | Noted: 2023-10-18

## 2025-02-11 PROBLEM — Z85.9 HISTORY OF MALIGNANT NEOPLASM: Status: ACTIVE | Noted: 2025-02-11

## 2025-02-11 PROBLEM — Z86.2 HISTORY OF BLOOD DISORDER: Status: ACTIVE | Noted: 2025-02-11

## 2025-02-11 PROBLEM — E11.9 DIABETES MELLITUS TYPE 2, CONTROLLED, WITHOUT COMPLICATIONS (MULTI): Status: ACTIVE | Noted: 2024-03-14

## 2025-02-11 PROCEDURE — G2211 COMPLEX E/M VISIT ADD ON: HCPCS

## 2025-02-11 PROCEDURE — 1159F MED LIST DOCD IN RCRD: CPT

## 2025-02-11 PROCEDURE — 3077F SYST BP >= 140 MM HG: CPT

## 2025-02-11 PROCEDURE — 3079F DIAST BP 80-89 MM HG: CPT

## 2025-02-11 PROCEDURE — G0442 ANNUAL ALCOHOL SCREEN 15 MIN: HCPCS

## 2025-02-11 PROCEDURE — 99204 OFFICE O/P NEW MOD 45 MIN: CPT

## 2025-02-11 PROCEDURE — G0447 BEHAVIOR COUNSEL OBESITY 15M: HCPCS

## 2025-02-11 RX ORDER — DICLOFENAC SODIUM 10 MG/G
4 GEL TOPICAL 2 TIMES DAILY PRN
Qty: 100 G | Refills: 11 | Status: SHIPPED | OUTPATIENT
Start: 2025-02-11

## 2025-02-11 RX ORDER — SENNOSIDES 8.6 MG
1 TABLET ORAL
COMMUNITY
Start: 2024-03-17 | End: 2025-02-11 | Stop reason: WASHOUT

## 2025-02-11 RX ORDER — TELMISARTAN 80 MG/1
80 TABLET ORAL DAILY
Qty: 90 TABLET | Refills: 3 | Status: SHIPPED | OUTPATIENT
Start: 2025-02-11

## 2025-02-11 RX ORDER — LORATADINE 10 MG/1
10 TABLET ORAL
Qty: 90 TABLET | Refills: 3 | Status: SHIPPED | OUTPATIENT
Start: 2025-02-11

## 2025-02-11 RX ORDER — TELMISARTAN 40 MG/1
40 TABLET ORAL DAILY
COMMUNITY
Start: 2025-01-08 | End: 2025-02-11 | Stop reason: SDUPTHER

## 2025-02-11 RX ORDER — AMLODIPINE BESYLATE 5 MG/1
1 TABLET ORAL
COMMUNITY
Start: 2025-01-08 | End: 2025-02-11 | Stop reason: ALTCHOICE

## 2025-02-11 RX ORDER — ERGOCALCIFEROL 1.25 MG/1
1 CAPSULE ORAL
Qty: 13 CAPSULE | Refills: 3 | Status: SHIPPED | OUTPATIENT
Start: 2025-02-11

## 2025-02-11 RX ORDER — FUROSEMIDE 20 MG/1
20 TABLET ORAL DAILY
Qty: 90 TABLET | Refills: 3 | Status: SHIPPED | OUTPATIENT
Start: 2025-02-11

## 2025-02-11 RX ORDER — LORATADINE 10 MG/1
1 TABLET ORAL
COMMUNITY
Start: 2024-04-20 | End: 2025-02-11 | Stop reason: SDUPTHER

## 2025-02-11 RX ORDER — SIMVASTATIN 80 MG/1
80 TABLET, FILM COATED ORAL NIGHTLY
Qty: 90 TABLET | Refills: 3 | Status: SHIPPED | OUTPATIENT
Start: 2025-02-11

## 2025-02-11 RX ORDER — GLIPIZIDE 5 MG/1
5 TABLET, FILM COATED, EXTENDED RELEASE ORAL DAILY
Qty: 90 TABLET | Refills: 3 | Status: SHIPPED | OUTPATIENT
Start: 2025-02-11

## 2025-02-11 RX ORDER — ACETAMINOPHEN 500 MG
1000 TABLET ORAL EVERY 8 HOURS PRN
COMMUNITY

## 2025-02-11 RX ORDER — GLIPIZIDE 5 MG/1
1 TABLET, FILM COATED, EXTENDED RELEASE ORAL
COMMUNITY
Start: 2025-01-08 | End: 2025-02-11 | Stop reason: SDUPTHER

## 2025-02-11 RX ORDER — AMLODIPINE BESYLATE 5 MG/1
5 TABLET ORAL
Qty: 90 TABLET | Refills: 3 | Status: CANCELLED | OUTPATIENT
Start: 2025-02-11

## 2025-02-11 RX ORDER — FUROSEMIDE 20 MG/1
20 TABLET ORAL 2 TIMES DAILY
COMMUNITY
End: 2025-02-11 | Stop reason: SDUPTHER

## 2025-02-11 RX ORDER — ERGOCALCIFEROL 1.25 MG/1
1 CAPSULE ORAL
COMMUNITY
Start: 2024-09-16 | End: 2025-02-11 | Stop reason: SDUPTHER

## 2025-02-11 RX ORDER — SIMVASTATIN 80 MG/1
80 TABLET, FILM COATED ORAL NIGHTLY
COMMUNITY
Start: 2025-01-08 | End: 2025-02-11 | Stop reason: SDUPTHER

## 2025-02-11 ASSESSMENT — PATIENT HEALTH QUESTIONNAIRE - PHQ9
SUM OF ALL RESPONSES TO PHQ9 QUESTIONS 1 AND 2: 0
2. FEELING DOWN, DEPRESSED OR HOPELESS: NOT AT ALL
1. LITTLE INTEREST OR PLEASURE IN DOING THINGS: NOT AT ALL

## 2025-02-11 NOTE — PROGRESS NOTES
Subjective   Sarah Manzo is a 79 y.o. female   Patient presents to establish care.    She had left upper extremity paresthesias that resolved with neck surgery onto her intervertebral discs.    She has chronic bilateral knee pain.  She has had gel shots in the knees in the past which did help.  She also had a gel shot in her shoulder which helped for a while.  The pain in these areas is starting to come back and she thinks she might need another gel shot.  She was following with a sports medicine physician for this.    She suffers from chronic constipation.  She used to take senna occasionally    She has bilateral ankle swelling.  She is on amlodipine which could be causing the swelling.  She takes Lasix 20 mg every day because of the swelling.  She is unable to put compression socks on by herself currently    Objective   /80   Pulse 71   Temp 36.2 °C (97.2 °F)   Wt 101 kg (223 lb 2 oz)   BMI 33.93 kg/m²    PHYSICAL EXAM  Gen: Well appearing, in NAD  Eyes: EOMI  HEENT: MMM  Heart: RRR, no murmurs  Lungs: No increased work of breathing, CTAB, on RA  GI: Soft, NTND, no guarding or rebound  Extremities: WWP, cap refill <2sec, 2+ pitting edema in LE b/l  Neuro: Alert, symmetrical facies, moves all extremities equally  Skin: No rashes or lesions  Psych: Appropriate mood and affect    Assessment/Plan     Follow-up for Medicare annual wellness visit at the end of March or early April    Problem List Items Addressed This Visit       Primary hypertension - Primary    Current Assessment & Plan     Blood pressure elevated in the office today at 164/80.  She has not been checking them at home recently.  She is currently on telmisartan 40 mg daily, amlodipine 5 mg daily, Lasix 20 mg daily.  She is having 2+ bilateral lower extremity pitting edema.  Discussed this could very likely be a side effect of the amlodipine.  Recommend we stop the amlodipine entirely and increase the telmisartan from 40 to 80 mg daily.   Recommend she check blood pressures at home, write these down, follow-up with me in the next month or 2         Relevant Medications    telmisartan (MIcarDIS) 80 mg tablet    Osteoarthritis    History of uterine cancer    Overview     Status post hysterectomy         History of DVT (deep vein thrombosis)    Vitamin D deficiency    Relevant Medications    ergocalciferol (Vitamin D-2) 1.25 MG (21683 UT) capsule    Vitamin B12 deficiency    Mixed hyperlipidemia    Overview     On simvastatin 80 mg nightly         Relevant Medications    simvastatin (Zocor) 80 mg tablet    Leg edema    Current Assessment & Plan     Will stop amlodipine as this could be contributing to the leg edema.  Also recommend compression socks.  She has a hard time getting them on so recommended she buy one of the compression sock devices to help her put them on         Relevant Medications    furosemide (Lasix) 20 mg tablet    Allergic rhinitis    Overview     Well-controlled with Claritin daily         Relevant Medications    loratadine (Claritin) 10 mg tablet    Arthritis of knee    Current Assessment & Plan     Will send patient back to the sports physician for more gel injections of her knee and shoulder         Relevant Medications    diclofenac sodium (Voltaren) 1 % gel    Other Relevant Orders    Referral to Sports Medicine    Shoulder arthritis    Relevant Orders    Referral to Sports Medicine    Chronic idiopathic constipation    Current Assessment & Plan     Recommend MiraLAX 1-2 capfuls daily with a goal of 1-2 soft bowel movements daily         S/P hysterectomy    Type 2 diabetes mellitus with diabetic neuropathy, without long-term current use of insulin    Current Assessment & Plan     Will get a release of records of her recent blood work.  She is currently only taking glipizide 5 mg once daily.  She is also on simvastatin.  Unsure about last diabetic eye exam.  Will see if there is a urine microalbumin on her labs she had done  recently at an outside facility         Relevant Medications    glipiZIDE XL (Glucotrol XL) 5 mg 24 hr tablet     Other Visit Diagnoses       Controlled type 2 diabetes mellitus without complication, without long-term current use of insulin (Multi)              I spent up to 15 minutes obtaining and discussing alcohol use screening.     I spent greater than 15 minutes face-to-face with the individual providing recommendations for nutrition choices and an exercise plan to help achieve weight reduction.    Jessica Fernandes D.O.  Family Medicine Physician  [facility]  [address] [place] [address] [phone]    This note has been transcribed using Dragon voice recognition system and there is a possibility of unintentional typing misprints.

## 2025-02-12 VITALS
SYSTOLIC BLOOD PRESSURE: 164 MMHG | HEIGHT: 68 IN | TEMPERATURE: 97.2 F | BODY MASS INDEX: 33.82 KG/M2 | WEIGHT: 223.13 LBS | DIASTOLIC BLOOD PRESSURE: 80 MMHG | HEART RATE: 71 BPM

## 2025-02-12 PROBLEM — E11.40 TYPE 2 DIABETES MELLITUS WITH DIABETIC NEUROPATHY, WITHOUT LONG-TERM CURRENT USE OF INSULIN: Status: ACTIVE | Noted: 2025-02-12

## 2025-02-12 PROBLEM — E66.811 CLASS 1 OBESITY DUE TO EXCESS CALORIES WITH SERIOUS COMORBIDITY AND BODY MASS INDEX (BMI) OF 33.0 TO 33.9 IN ADULT: Status: ACTIVE | Noted: 2025-02-12

## 2025-02-12 PROBLEM — E66.09 CLASS 1 OBESITY DUE TO EXCESS CALORIES WITH SERIOUS COMORBIDITY AND BODY MASS INDEX (BMI) OF 33.0 TO 33.9 IN ADULT: Status: ACTIVE | Noted: 2025-02-12

## 2025-02-13 NOTE — ASSESSMENT & PLAN NOTE
Will stop amlodipine as this could be contributing to the leg edema.  Also recommend compression socks.  She has a hard time getting them on so recommended she buy one of the compression sock devices to help her put them on

## 2025-02-13 NOTE — ASSESSMENT & PLAN NOTE
Blood pressure elevated in the office today at 164/80.  She has not been checking them at home recently.  She is currently on telmisartan 40 mg daily, amlodipine 5 mg daily, Lasix 20 mg daily.  She is having 2+ bilateral lower extremity pitting edema.  Discussed this could very likely be a side effect of the amlodipine.  Recommend we stop the amlodipine entirely and increase the telmisartan from 40 to 80 mg daily.  Recommend she check blood pressures at home, write these down, follow-up with me in the next month or 2

## 2025-02-13 NOTE — ASSESSMENT & PLAN NOTE
Will get a release of records of her recent blood work.  She is currently only taking glipizide 5 mg once daily.  She is also on simvastatin.  Unsure about last diabetic eye exam.  Will see if there is a urine microalbumin on her labs she had done recently at an outside facility

## 2025-02-24 ENCOUNTER — APPOINTMENT (OUTPATIENT)
Dept: ORTHOPEDIC SURGERY | Facility: CLINIC | Age: 80
End: 2025-02-24
Payer: COMMERCIAL

## 2025-03-03 ENCOUNTER — APPOINTMENT (OUTPATIENT)
Dept: ORTHOPEDIC SURGERY | Facility: CLINIC | Age: 80
End: 2025-03-03
Payer: COMMERCIAL

## 2025-03-03 ENCOUNTER — HOSPITAL ENCOUNTER (OUTPATIENT)
Dept: RADIOLOGY | Facility: EXTERNAL LOCATION | Age: 80
Discharge: HOME | End: 2025-03-03

## 2025-03-03 DIAGNOSIS — R29.898 WEAKNESS OF BOTH HANDS: ICD-10-CM

## 2025-03-03 DIAGNOSIS — M65.30 TRIGGER FINGER, UNSPECIFIED FINGER, UNSPECIFIED LATERALITY: ICD-10-CM

## 2025-03-03 DIAGNOSIS — R20.0 LEFT UPPER EXTREMITY NUMBNESS: ICD-10-CM

## 2025-03-03 DIAGNOSIS — R29.898 HAND WEAKNESS: ICD-10-CM

## 2025-03-03 PROCEDURE — 76942 ECHO GUIDE FOR BIOPSY: CPT | Performed by: FAMILY MEDICINE

## 2025-03-03 PROCEDURE — 1036F TOBACCO NON-USER: CPT | Performed by: FAMILY MEDICINE

## 2025-03-03 PROCEDURE — 1159F MED LIST DOCD IN RCRD: CPT | Performed by: FAMILY MEDICINE

## 2025-03-03 PROCEDURE — 20550 NJX 1 TENDON SHEATH/LIGAMENT: CPT | Performed by: FAMILY MEDICINE

## 2025-03-03 PROCEDURE — 99214 OFFICE O/P EST MOD 30 MIN: CPT | Performed by: FAMILY MEDICINE

## 2025-03-03 RX ORDER — BETAMETHASONE SODIUM PHOSPHATE AND BETAMETHASONE ACETATE 3; 3 MG/ML; MG/ML
3 INJECTION, SUSPENSION INTRA-ARTICULAR; INTRALESIONAL; INTRAMUSCULAR; SOFT TISSUE
Status: COMPLETED | OUTPATIENT
Start: 2025-03-03 | End: 2025-03-03

## 2025-03-03 RX ORDER — LIDOCAINE HYDROCHLORIDE 10 MG/ML
0.5 INJECTION, SOLUTION INFILTRATION; PERINEURAL
Status: COMPLETED | OUTPATIENT
Start: 2025-03-03 | End: 2025-03-03

## 2025-03-03 RX ADMIN — LIDOCAINE HYDROCHLORIDE 0.5 ML: 10 INJECTION, SOLUTION INFILTRATION; PERINEURAL at 11:12

## 2025-03-03 RX ADMIN — BETAMETHASONE SODIUM PHOSPHATE AND BETAMETHASONE ACETATE 3 MG: 3; 3 INJECTION, SUSPENSION INTRA-ARTICULAR; INTRALESIONAL; INTRAMUSCULAR; SOFT TISSUE at 11:12

## 2025-03-03 NOTE — PROGRESS NOTES
Established Patient Follow-Up Visit    CC:   Chief Complaint   Patient presents with    Right Knee - Follow-up     Synvisc #3 on 3.13.24    Left Knee - Follow-up     Synvisc #3 on 3.13.24    Right Shoulder - Follow-up     Benjie injection on 3.13.24       HPI:  Sarah is a 79 y.o. female returns here today for follow-up visit regarding: Chronic bilateral knee pain requesting repeat injections with gel.  Also ongoing chronic right shoulder pain would like to request injection as well.  She also has some questions regarding return of numbness tingling and weakness to the left upper extremity.  She has a history of previous bilateral carpal tunnel surgery.  She states a significant amount of weakness to the hand and she points out an obvious soft tissue deformity.  She indicates her left ring finger is triggering.          REVIEW OF SYSTEMS:  GENERAL: Negative for malaise, significant weight loss, fever  MUSCULOSKELETAL: See HPI  NEURO: Positive for numbness / tingling       PHYSICAL EXAM:  -Neuro: The bilateral upper and lower extremities were examined and demonstrated intact sensation medially and laterally.  -Extremity: Right shoulder exam demonstrates limited range of motion forward flexion to 95 degrees lateral abduction to 75.  4 out of 5 strength with resisted forward flexion lateral abduction  strength.  Some crepitus noted without any obvious bony tenderness.  Left hand exam demonstrates triggering about the right index finger with no redness or erythema.  No open cuts or sores.  Significant atrophy over the thenar aspect of the thumb with weakness.  She immediately fails finger pinch strength test with the inability to maintain  on a sheet of paper.  Left  strength significantly weaker than the right.  Positive Tinel and Phalen's.    Bilateral knee exams demonstrate some crepitus with full flexion extension about the knees some mild crepitus no medial or lateral joint line pain calves are soft and  nontender.  IMAGING: No imaging today  Point of Care Ultrasound  These images are not reportable by radiology and will not be interpreted   by  Radiologists.      PROCEDURE: Left ring finger trigger finger trigger injection as below  Hand / UE Inj/Asp: L ring A1 for trigger finger on 3/3/2025 11:12 AM  Indications: tendon swelling, therapeutic and pain  Details: 25 G needle, ultrasound-guided volar approach  Medications: 0.5 mL lidocaine 10 mg/mL (1 %); 3 mg betamethasone acet,sod phos 6 mg/mL  Outcome: tolerated well, no immediate complications  Procedure, treatment alternatives, risks and benefits explained, specific risks discussed. Consent was given by the patient. Immediately prior to procedure a time out was called to verify the correct patient, procedure, equipment, support staff and site/side marked as required. Patient was prepped and draped in the usual sterile fashion.            ASSESSMENT:   Follow-up visit for:  Problem List Items Addressed This Visit    None  Visit Diagnoses       Hand weakness        Left upper extremity numbness        Relevant Orders    EMG & nerve conduction    Trigger finger, unspecified finger, unspecified laterality        Relevant Orders    Point of Care Ultrasound (Completed)    Hand / UE Inj/Asp: L ring A1    Weakness of both hands        Relevant Orders    EMG & nerve conduction             PLAN: At this time regarding the patient's recurrence of numbness tingling and weakness to her left upper extremity we will obtain repeat bilateral upper extremity nerve conduction EMG study to assess for median nerve pathology.  The patient tolerated the trigger finger injection well today.  We can plan or consider repeat injections down the road.  She will follow-up with our hand team going forward to discuss the nerve conduction EMG studies.  I will see her back once the gel injections have been improved for bilateral knee OA.  Additionally we will submit for gel injection to the  right shoulder.  If it disallow from insurance, will make sure to find an alternative gel injection for her right shoulder.  She states the previous gel shots of last year.  She would like to try single injection if able I discussed that this is certainly something we will shoot for but may be at the discretion of the insurance company.  Orders Placed This Encounter    Hand / UE Inj/Asp: L ring A1    Point of Care Ultrasound    EMG & nerve conduction           At the conclusion of the visit there were no further questions by the patient/family regarding their plan of care.  Patient was instructed to call or return with any issues, questions, or concerns regarding their injury and/or treatment plan described above.     03/03/25 at 4:09 PM - Cole C Budinsky, MD    Office: [phone]    This note was prepared using voice recognition software.  The details of this note are correct and have been reviewed, and corrected to the best of my ability.  Some grammatical errors may persist related to the Dragon software.

## 2025-03-24 ENCOUNTER — APPOINTMENT (OUTPATIENT)
Dept: PRIMARY CARE | Facility: CLINIC | Age: 80
End: 2025-03-24
Payer: COMMERCIAL

## 2025-03-24 VITALS
BODY MASS INDEX: 33.25 KG/M2 | HEIGHT: 68 IN | HEART RATE: 68 BPM | DIASTOLIC BLOOD PRESSURE: 80 MMHG | WEIGHT: 219.4 LBS | OXYGEN SATURATION: 94 % | SYSTOLIC BLOOD PRESSURE: 130 MMHG | RESPIRATION RATE: 18 BRPM | TEMPERATURE: 97.2 F

## 2025-03-24 DIAGNOSIS — E78.2 MIXED HYPERLIPIDEMIA: ICD-10-CM

## 2025-03-24 DIAGNOSIS — I10 PRIMARY HYPERTENSION: ICD-10-CM

## 2025-03-24 DIAGNOSIS — E11.40 TYPE 2 DIABETES MELLITUS WITH DIABETIC NEUROPATHY, WITHOUT LONG-TERM CURRENT USE OF INSULIN: ICD-10-CM

## 2025-03-24 DIAGNOSIS — Z00.00 ANNUAL PHYSICAL EXAM: Primary | ICD-10-CM

## 2025-03-24 DIAGNOSIS — K59.04 CHRONIC IDIOPATHIC CONSTIPATION: ICD-10-CM

## 2025-03-24 DIAGNOSIS — E66.811 CLASS 1 OBESITY DUE TO EXCESS CALORIES WITH SERIOUS COMORBIDITY AND BODY MASS INDEX (BMI) OF 33.0 TO 33.9 IN ADULT: ICD-10-CM

## 2025-03-24 DIAGNOSIS — E66.09 CLASS 1 OBESITY DUE TO EXCESS CALORIES WITH SERIOUS COMORBIDITY AND BODY MASS INDEX (BMI) OF 33.0 TO 33.9 IN ADULT: ICD-10-CM

## 2025-03-24 DIAGNOSIS — M19.90 OSTEOARTHRITIS, UNSPECIFIED OSTEOARTHRITIS TYPE, UNSPECIFIED SITE: ICD-10-CM

## 2025-03-24 DIAGNOSIS — M85.88 OTHER SPECIFIED DISORDERS OF BONE DENSITY AND STRUCTURE, OTHER SITE: ICD-10-CM

## 2025-03-24 DIAGNOSIS — E55.9 VITAMIN D DEFICIENCY: ICD-10-CM

## 2025-03-24 DIAGNOSIS — R60.0 LEG EDEMA: ICD-10-CM

## 2025-03-24 DIAGNOSIS — Z23 IMMUNIZATION DUE: ICD-10-CM

## 2025-03-24 PROCEDURE — 90471 IMMUNIZATION ADMIN: CPT

## 2025-03-24 PROCEDURE — 99397 PER PM REEVAL EST PAT 65+ YR: CPT

## 2025-03-24 PROCEDURE — 1170F FXNL STATUS ASSESSED: CPT

## 2025-03-24 PROCEDURE — 3075F SYST BP GE 130 - 139MM HG: CPT

## 2025-03-24 PROCEDURE — 1159F MED LIST DOCD IN RCRD: CPT

## 2025-03-24 PROCEDURE — G0447 BEHAVIOR COUNSEL OBESITY 15M: HCPCS

## 2025-03-24 PROCEDURE — 3079F DIAST BP 80-89 MM HG: CPT

## 2025-03-24 PROCEDURE — G0444 DEPRESSION SCREEN ANNUAL: HCPCS

## 2025-03-24 PROCEDURE — G0439 PPPS, SUBSEQ VISIT: HCPCS

## 2025-03-24 PROCEDURE — 99497 ADVNCD CARE PLAN 30 MIN: CPT

## 2025-03-24 PROCEDURE — 90715 TDAP VACCINE 7 YRS/> IM: CPT

## 2025-03-24 ASSESSMENT — ACTIVITIES OF DAILY LIVING (ADL)
GROCERY_SHOPPING: INDEPENDENT
MANAGING_FINANCES: INDEPENDENT
DRESSING: INDEPENDENT
TAKING_MEDICATION: INDEPENDENT
DOING_HOUSEWORK: INDEPENDENT
BATHING: INDEPENDENT

## 2025-03-24 ASSESSMENT — PATIENT HEALTH QUESTIONNAIRE - PHQ9
1. LITTLE INTEREST OR PLEASURE IN DOING THINGS: NOT AT ALL
SUM OF ALL RESPONSES TO PHQ9 QUESTIONS 1 AND 2: 0
SUM OF ALL RESPONSES TO PHQ9 QUESTIONS 1 AND 2: 0
2. FEELING DOWN, DEPRESSED OR HOPELESS: NOT AT ALL
2. FEELING DOWN, DEPRESSED OR HOPELESS: NOT AT ALL
1. LITTLE INTEREST OR PLEASURE IN DOING THINGS: NOT AT ALL

## 2025-03-24 NOTE — PROGRESS NOTES
Subjective   Sarah Manzo is a 79 y.o. female     MEDICARE ANNUAL WELLNESS VISIT    Specialists that pt follows with: sports med, ortho hand, ophthalmologist (wants referral to cards)    Preventative:  - Immunizations: UTD on influenza, covid x4, shingrix, prevnar. Needs Tdap  - DEXA scan: Overdue  - Chlamydia/Gonorrhea testing: Not interested   - 1 time Hep C testing: done  - 1 time HIV testing: done  - Dental care: Needs   - Vision care: UTD  - Pt denies any recent falls  - Pt does not have a living will and advanced directive     Lifestyle:  - Occupation: Retired  - Diet: Well balanced  - Exercise: Regularly  - Alcohol/tobacco/drugs: No social alcohol use, no tobacco/nicotine. Occasional CBD gummies  - Mood: Good    Active Problem List      Comprehensive Medical/Surgical/Social/Family History  Past Medical History:   Diagnosis Date    Anesthesia of skin     Numbness and tingling    Personal history of diseases of the blood and blood-forming organs and certain disorders involving the immune mechanism     History of bleeding disorder    Personal history of malignant neoplasm, unspecified     History of malignant neoplasm    Personal history of other diseases of the circulatory system     History of hypertension    Personal history of other diseases of the musculoskeletal system and connective tissue     History of arthritis    Personal history of other endocrine, nutritional and metabolic disease     History of diabetes mellitus    Personal history of other specified conditions     History of balance disorder    Unspecified abnormalities of breathing     Breathing problem    Unspecified disorder of ear, unspecified ear     Ear, nose and throat disorder     Past Surgical History:   Procedure Laterality Date    OTHER SURGICAL HISTORY  07/20/2022    Cervical surgery    OTHER SURGICAL HISTORY  07/20/2022    Back surgery    OTHER SURGICAL HISTORY  07/20/2022    Knee arthroscopy     Social History     Social History  "Narrative    Not on file       Allergies and Medications  Hydrocodone-acetaminophen  Current Outpatient Medications on File Prior to Visit   Medication Sig Dispense Refill    acetaminophen (Tylenol) 500 mg tablet Take 2 tablets (1,000 mg) by mouth every 8 hours if needed for mild pain (1 - 3).      diclofenac sodium (Voltaren) 1 % gel Apply 4.5 inches (4 g) topically 2 times a day as needed (pain). 100 g 11    ergocalciferol (Vitamin D-2) 1.25 MG (58860 UT) capsule Take 1 capsule (1,250 mcg) by mouth every 7 days. 13 capsule 3    furosemide (Lasix) 20 mg tablet Take 1 tablet (20 mg) by mouth once daily. 90 tablet 3    glipiZIDE XL (Glucotrol XL) 5 mg 24 hr tablet Take 1 tablet (5 mg) by mouth once daily. 90 tablet 3    simvastatin (Zocor) 80 mg tablet Take 1 tablet (80 mg) by mouth once daily at bedtime. 90 tablet 3    telmisartan (MIcarDIS) 80 mg tablet Take 1 tablet (80 mg) by mouth once daily. 90 tablet 3    [DISCONTINUED] loratadine (Claritin) 10 mg tablet Take 1 tablet (10 mg) by mouth early in the morning.. (Patient not taking: Reported on 3/24/2025) 90 tablet 3     No current facility-administered medications on file prior to visit.       Objective   /80   Pulse 68   Temp 36.2 °C (97.2 °F)   Resp 18   Ht 1.727 m (5' 8\")   Wt 99.5 kg (219 lb 6.4 oz)   SpO2 94% Comment: Normally run low on Oxygen  BMI 33.36 kg/m²    PHYSICAL EXAM  Gen: Well appearing, in NAD  Eyes: EOMI  HEENT: MMM. TMs normal. Throat normal.  Heart: RRR, no murmurs  Lungs: No increased work of breathing, CTAB, on RA  GI: Soft, NTND, no guarding or rebound  Extremities: WWP, cap refill <2sec, no pitting edema in LE b/l  Neuro: Alert, symmetrical facies, moves all extremities equally  Skin: No rashes or lesions  Psych: Appropriate mood and affect    Assessment/Plan   - Reviewed Social Determinants of health with patient. Discussed healthy lifestyle, including 150 minutes of physical activity per week.  - Ordered/Reviewed baseline " labwork   - Immunizations up to date  - Follow up in 1 year for next annual physical or sooner for acute concerns    Problem List Items Addressed This Visit       Primary hypertension    Overview     Well-controlled on telmisartan 80 mg daily, Lasix 20 mg daily         Relevant Orders    Referral to Cardiology    Osteoarthritis    Overview     Takes Tylenol 1000 mg every 8 hours as needed         Vitamin D deficiency    Overview     Takes vitamin D 50,000 units once weekly         Mixed hyperlipidemia    Overview     On simvastatin 80 mg nightly         Leg edema    Overview     On Lasix 20 mg daily.  Recommended compression socks/stockings         Relevant Orders    Referral to Cardiology    Chronic idiopathic constipation    Current Assessment & Plan     She has been trying to take less than 1 capful of MiraLAX daily and is not helping quite enough.  I recommended she increase to a full cap of MiraLAX twice daily with plenty of water         Type 2 diabetes mellitus with diabetic neuropathy, without long-term current use of insulin    Overview     On glipizide 5 mg daily.  She is on a statin and an ARB.  Annual diabetic eye exam is up-to-date.         Current Assessment & Plan     Will get an A1c and urine microalbumin as part of annual lab work         Relevant Orders    Hemoglobin A1C    Albumin-Creatinine Ratio, Urine Random    Class 1 obesity due to excess calories with serious comorbidity and body mass index (BMI) of 33.0 to 33.9 in adult    Overview     Counseled on healthy diet and regular exercise         Relevant Orders    CBC    Lipid Panel    Comprehensive Metabolic Panel    TSH with reflex to Free T4 if abnormal    Vitamin D 25-Hydroxy,Total (for eval of Vitamin D levels)    Vitamin B12     Other Visit Diagnoses       Annual physical exam    -  Primary    Immunization due        Relevant Orders    Tdap vaccine, age 7 years and older (Completed)    Other specified disorders of bone density and  structure, other site        Relevant Orders    XR DEXA bone density          I spent 15 minutes obtaining and discussing depression screening.     I spent greater than 16 minutes discussing advanced care planning documentation, including the explanation and discussion of advanced directives.     I spent greater than 15 minutes face-to-face with the individual providing recommendations for nutrition choices and an exercise plan to help achieve weight reduction.     Jessica Fernandes D.O.  Family Medicine Physician  [facility]  [address] [place] [address] [phone]    This note has been transcribed using Dragon voice recognition system and there is a possibility of unintentional typing misprints.

## 2025-03-24 NOTE — ASSESSMENT & PLAN NOTE
She has been trying to take less than 1 capful of MiraLAX daily and is not helping quite enough.  I recommended she increase to a full cap of MiraLAX twice daily with plenty of water

## 2025-03-27 ENCOUNTER — HOSPITAL ENCOUNTER (OUTPATIENT)
Dept: RADIOLOGY | Facility: CLINIC | Age: 80
Discharge: HOME | End: 2025-03-27
Payer: COMMERCIAL

## 2025-03-27 DIAGNOSIS — M85.88 OTHER SPECIFIED DISORDERS OF BONE DENSITY AND STRUCTURE, OTHER SITE: ICD-10-CM

## 2025-03-27 PROCEDURE — 77080 DXA BONE DENSITY AXIAL: CPT

## 2025-03-28 LAB
25(OH)D3+25(OH)D2 SERPL-MCNC: 62 NG/ML (ref 30–100)
ALBUMIN SERPL-MCNC: 4.1 G/DL (ref 3.6–5.1)
ALBUMIN/CREAT UR: 943 MG/G CREAT
ALP SERPL-CCNC: 82 U/L (ref 37–153)
ALT SERPL-CCNC: 13 U/L (ref 6–29)
ANION GAP SERPL CALCULATED.4IONS-SCNC: 8 MMOL/L (CALC) (ref 7–17)
AST SERPL-CCNC: 11 U/L (ref 10–35)
BILIRUB SERPL-MCNC: 0.4 MG/DL (ref 0.2–1.2)
BUN SERPL-MCNC: 38 MG/DL (ref 7–25)
CALCIUM SERPL-MCNC: 8.9 MG/DL (ref 8.6–10.4)
CHLORIDE SERPL-SCNC: 106 MMOL/L (ref 98–110)
CHOLEST SERPL-MCNC: 134 MG/DL
CHOLEST/HDLC SERPL: 2.2 (CALC)
CO2 SERPL-SCNC: 27 MMOL/L (ref 20–32)
CREAT SERPL-MCNC: 1.86 MG/DL (ref 0.6–1)
CREAT UR-MCNC: 40 MG/DL (ref 20–275)
EGFRCR SERPLBLD CKD-EPI 2021: 27 ML/MIN/1.73M2
ERYTHROCYTE [DISTWIDTH] IN BLOOD BY AUTOMATED COUNT: 12.9 % (ref 11–15)
EST. AVERAGE GLUCOSE BLD GHB EST-MCNC: 192 MG/DL
EST. AVERAGE GLUCOSE BLD GHB EST-SCNC: 10.6 MMOL/L
GLUCOSE SERPL-MCNC: 152 MG/DL (ref 65–99)
HBA1C MFR BLD: 8.3 % OF TOTAL HGB
HCT VFR BLD AUTO: 35.2 % (ref 35–45)
HDLC SERPL-MCNC: 61 MG/DL
HGB BLD-MCNC: 11.5 G/DL (ref 11.7–15.5)
LDLC SERPL CALC-MCNC: 58 MG/DL (CALC)
MCH RBC QN AUTO: 30.9 PG (ref 27–33)
MCHC RBC AUTO-ENTMCNC: 32.7 G/DL (ref 32–36)
MCV RBC AUTO: 94.6 FL (ref 80–100)
MICROALBUMIN UR-MCNC: 37.7 MG/DL
NONHDLC SERPL-MCNC: 73 MG/DL (CALC)
PLATELET # BLD AUTO: 219 THOUSAND/UL (ref 140–400)
PMV BLD REES-ECKER: 10.5 FL (ref 7.5–12.5)
POTASSIUM SERPL-SCNC: 4.7 MMOL/L (ref 3.5–5.3)
PROT SERPL-MCNC: 7.2 G/DL (ref 6.1–8.1)
RBC # BLD AUTO: 3.72 MILLION/UL (ref 3.8–5.1)
SODIUM SERPL-SCNC: 141 MMOL/L (ref 135–146)
TRIGL SERPL-MCNC: 69 MG/DL
TSH SERPL-ACNC: 3.13 MIU/L (ref 0.4–4.5)
VIT B12 SERPL-MCNC: 330 PG/ML (ref 200–1100)
WBC # BLD AUTO: 8.9 THOUSAND/UL (ref 3.8–10.8)

## 2025-03-31 ENCOUNTER — APPOINTMENT (OUTPATIENT)
Dept: ORTHOPEDIC SURGERY | Facility: CLINIC | Age: 80
End: 2025-03-31
Payer: COMMERCIAL

## 2025-03-31 ENCOUNTER — HOSPITAL ENCOUNTER (OUTPATIENT)
Dept: RADIOLOGY | Facility: EXTERNAL LOCATION | Age: 80
Discharge: HOME | End: 2025-03-31

## 2025-03-31 DIAGNOSIS — D64.9 ANEMIA, UNSPECIFIED TYPE: ICD-10-CM

## 2025-03-31 DIAGNOSIS — N18.4 CKD (CHRONIC KIDNEY DISEASE) STAGE 4, GFR 15-29 ML/MIN (MULTI): Primary | ICD-10-CM

## 2025-03-31 DIAGNOSIS — M81.0 AGE-RELATED OSTEOPOROSIS WITHOUT CURRENT PATHOLOGICAL FRACTURE: Primary | ICD-10-CM

## 2025-03-31 DIAGNOSIS — M17.0 BILATERAL PRIMARY OSTEOARTHRITIS OF KNEE: ICD-10-CM

## 2025-03-31 DIAGNOSIS — R80.9 MICROALBUMINURIA: ICD-10-CM

## 2025-03-31 DIAGNOSIS — E11.40 TYPE 2 DIABETES MELLITUS WITH DIABETIC NEUROPATHY, WITHOUT LONG-TERM CURRENT USE OF INSULIN: ICD-10-CM

## 2025-03-31 PROCEDURE — 20611 DRAIN/INJ JOINT/BURSA W/US: CPT | Performed by: FAMILY MEDICINE

## 2025-03-31 PROCEDURE — 1159F MED LIST DOCD IN RCRD: CPT | Performed by: FAMILY MEDICINE

## 2025-03-31 PROCEDURE — 1036F TOBACCO NON-USER: CPT | Performed by: FAMILY MEDICINE

## 2025-03-31 RX ORDER — ALENDRONATE SODIUM 70 MG/1
70 TABLET ORAL
Qty: 12 TABLET | Refills: 3 | Status: SHIPPED | OUTPATIENT
Start: 2025-03-31 | End: 2026-03-31

## 2025-03-31 RX ORDER — GLIPIZIDE 10 MG/1
10 TABLET, FILM COATED, EXTENDED RELEASE ORAL DAILY
Qty: 90 TABLET | Refills: 3 | Status: SHIPPED | OUTPATIENT
Start: 2025-03-31

## 2025-03-31 NOTE — PROGRESS NOTES
Patient ID: Sarah Manzo is a 79 y.o. female.    L Inj/Asp: bilateral knee on 3/31/2025 2:44 PM  Indications: pain and joint swelling  Details: 22 G needle, ultrasound-guided superolateral approach  Medications (Right): 16 mg hylan 16 mg/2 mL  Medications (Left): 16 mg hylan 16 mg/2 mL  Outcome: tolerated well, no immediate complications  Procedure, treatment alternatives, risks and benefits explained, specific risks discussed. Consent was given by the patient. Immediately prior to procedure a time out was called to verify the correct patient, procedure, equipment, support staff and site/side marked as required. Patient was prepped and draped in the usual sterile fashion.         Patient received and tolerated both gel shots well today.  Routine postprocedure precautions were provided she is to call return with any issues we will see her back in 1 week for repeat bilateral gel injections for the second and third.  Additionally we will make sure to have the sample for her right shoulder for the right shoulder gel shot.  Additionally she states that the previous trigger finger injection from 4 weeks ago has done fantastic for her.    At the conclusion of the visit there were no further questions by the patient/family regarding their plan of care.  Patient was instructed to call or return with any issues, questions, or concerns regarding their injury and/or treatment plan described above.    This note was prepared using voice recognition software.  The details of this note are correct and have been reviewed, and corrected to the best of my ability.  Some grammatical errors may persist related to the Dragon software     Cole C. Budinsky, MD  Office: [phone]

## 2025-04-04 ENCOUNTER — APPOINTMENT (OUTPATIENT)
Dept: CARDIOLOGY | Facility: CLINIC | Age: 80
End: 2025-04-04
Payer: COMMERCIAL

## 2025-04-04 VITALS
WEIGHT: 215 LBS | HEIGHT: 68 IN | BODY MASS INDEX: 32.58 KG/M2 | SYSTOLIC BLOOD PRESSURE: 160 MMHG | DIASTOLIC BLOOD PRESSURE: 72 MMHG | HEART RATE: 67 BPM

## 2025-04-04 DIAGNOSIS — N18.4 CKD (CHRONIC KIDNEY DISEASE) STAGE 4, GFR 15-29 ML/MIN (MULTI): ICD-10-CM

## 2025-04-04 DIAGNOSIS — E11.40 TYPE 2 DIABETES MELLITUS WITH DIABETIC NEUROPATHY, WITHOUT LONG-TERM CURRENT USE OF INSULIN: ICD-10-CM

## 2025-04-04 DIAGNOSIS — E78.2 MIXED HYPERLIPIDEMIA: ICD-10-CM

## 2025-04-04 DIAGNOSIS — Z86.718 HISTORY OF DVT (DEEP VEIN THROMBOSIS): ICD-10-CM

## 2025-04-04 DIAGNOSIS — I10 PRIMARY HYPERTENSION: ICD-10-CM

## 2025-04-04 DIAGNOSIS — R53.83 OTHER FATIGUE: ICD-10-CM

## 2025-04-04 DIAGNOSIS — R60.0 LEG EDEMA: Primary | ICD-10-CM

## 2025-04-04 DIAGNOSIS — E66.09 CLASS 1 OBESITY DUE TO EXCESS CALORIES WITH SERIOUS COMORBIDITY AND BODY MASS INDEX (BMI) OF 33.0 TO 33.9 IN ADULT: ICD-10-CM

## 2025-04-04 DIAGNOSIS — Z78.9 NEVER SMOKED TOBACCO: ICD-10-CM

## 2025-04-04 DIAGNOSIS — E66.811 CLASS 1 OBESITY DUE TO EXCESS CALORIES WITH SERIOUS COMORBIDITY AND BODY MASS INDEX (BMI) OF 33.0 TO 33.9 IN ADULT: ICD-10-CM

## 2025-04-04 DIAGNOSIS — Z76.89 ENCOUNTER TO ESTABLISH CARE WITH NEW PROVIDER: ICD-10-CM

## 2025-04-04 PROCEDURE — 1159F MED LIST DOCD IN RCRD: CPT

## 2025-04-04 PROCEDURE — 93000 ELECTROCARDIOGRAM COMPLETE: CPT

## 2025-04-04 PROCEDURE — 3077F SYST BP >= 140 MM HG: CPT

## 2025-04-04 PROCEDURE — 1036F TOBACCO NON-USER: CPT

## 2025-04-04 PROCEDURE — 3078F DIAST BP <80 MM HG: CPT

## 2025-04-04 PROCEDURE — 99204 OFFICE O/P NEW MOD 45 MIN: CPT

## 2025-04-04 NOTE — PROGRESS NOTES
Referred by Jessica Foley DO  Chief complaint:   Chief Complaint   Patient presents with    New Patient Visit     Pt. Here for leg edema and primary Hypertensions         History of Present Illness  Sarah Manzo is a 79 y.o. year old female patient with history of hypertension, hyperlipidemia, diabetes Mellitus on oral medications and chronic kidney disease. Recently seen with ortho service for pain injections in legs. Patient had neck surgery in 2021 and developed provoked DVT after.     Today, patient presents alone. Able to drive self. States she is referred by PCP service today to establish care. Would like to follow up on leg edema and hypertension. States she does not follow with Nephrology service at this time, is on Lasix but not clear who started that or when.   Today patient states she is doing well, denies chest pain, shortness of breath. Denies syncope or near syncopal episodes. Denies palpitations, flutters or arrhythmias.        Social History     Tobacco Use    Smoking status: Never    Smokeless tobacco: Never   Vaping Use    Vaping status: Never Used   Substance Use Topics    Alcohol use: Never    Drug use: Yes     Types: Other     Comment: CBD gummies       Outpatient Medications:  Current Outpatient Medications   Medication Instructions    acetaminophen (TYLENOL) 1,000 mg, Every 8 hours PRN    alendronate (FOSAMAX) 70 mg, oral, Every 7 days, Take in the morning with a full glass of water, on an empty stomach, and do not take anything else by mouth or lie down for the next 30 min.    diclofenac sodium (VOLTAREN) 4 g, Topical, 2 times daily PRN    empagliflozin (JARDIANCE) 10 mg, oral, Daily    ergocalciferol (VITAMIN D-2) 1,250 mcg, oral, Every 7 days    glipiZIDE XL (GLUCOTROL XL) 10 mg, oral, Daily    simvastatin (ZOCOR) 80 mg, oral, Nightly    telmisartan (MICARDIS) 80 mg, oral, Daily         Vitals:  Vitals:    04/04/25 0859   BP: 160/72   Pulse: 67       Physical Exam:  General:  NAD, well-appearing  HEENT: moist mucous membranes, no jaundice  Neck: No JVD, no carotid bruit  Lungs: CTA tequila, no wheezing or rales  Cardiac: RRR, no murmurs  Abdomen: soft, non-tender, non-distended  Extremities: 2+ radial pulses, 2+ lower leg edema, palpable pulses  Skin: warm, dry, no wound  Neurologic: AAOx3,  no focal deficits       Reviewed Study(s):    Echo 3/2024:  - The left ventricle is normal in size. Left ventricular systolic function is   normal. EF = 55 ± 5% (2D biplane) Grade I left ventricular diastolic dysfunction.   - The right ventricle is normal in size. Right ventricular systolic function is   normal.     Lipid Panel 3/27/25:  TC- 134  HDL- 61  TG- 69  LDL- 58        Assessment/Plan   Diagnoses and all orders for this visit:  Leg edema  -     Referral to Cardiology  Primary hypertension  -     Referral to Cardiology  Mixed hyperlipidemia  Type 2 diabetes mellitus with diabetic neuropathy, without long-term current use of insulin  Class 1 obesity due to excess calories with serious comorbidity and body mass index (BMI) of 33.0 to 33.9 in adult  CKD (chronic kidney disease) stage 4, GFR 15-29 ml/min (Multi)  Never smoked tobacco  Encounter to establish care with new provider  Other fatigue  History of DVT (deep vein thrombosis)      #Hypertension  #Leg Edema  #Fatigue  #Remote history provoked DVT   #Stage IV Chronic Kidney Disease  -Will update Echo; future    -Surveillance bilateral venous duplex with acute leg edema and history remote DVT; future  -Kidney disease not currently managed, will discontinue Lasix at this time and refer to nephrology for chronic management.     RTC  after testing     Ernie Barrett MD, Ph,D, [facility]  Interventional Cardiologist - [facility] [place]  Director of Structural and Valvular Heart Intervention - [facility]     **Disclaimer: This note was dictated by speech recognition, and every  effort has been made to prevent any error in transcription, however minor errors may be present**

## 2025-04-04 NOTE — PATIENT INSTRUCTIONS
Patient to follow up after testing with Dr. Arnold MD     Office will arrange Echo in near future.   Please STOP Furosemide (Lasix) moving forward.     Will refer you to kidney service (Nephrology). Dr. Becki MD in near future.     In terms of your leg swelling- the Echo study will be helpful for us.   Will also arrange a venous duplex of both legs to make sure there is no blood clots again. Will call with results.     For compression stockings- try [employer] or [email or web address] to see   No other changes today.   Continue same medications and treatments.   Patient educated on proper medication use.   Patient educated on risk factor modification.   Please bring any lab results from other providers / physicians to your next appointment.     Please bring all medicines, vitamins, and herbal supplements with you when you come to the office.     Prescriptions will not be filled unless you are compliant with your follow up appointments or have a follow up appointment scheduled as per instruction of your physician. Refills should be requested at the time of your visit.    IJon RN am scribing for and in the presence of Dr. Arnold MD

## 2025-04-07 ENCOUNTER — APPOINTMENT (OUTPATIENT)
Dept: ORTHOPEDIC SURGERY | Facility: CLINIC | Age: 80
End: 2025-04-07
Payer: COMMERCIAL

## 2025-04-07 ENCOUNTER — HOSPITAL ENCOUNTER (OUTPATIENT)
Dept: RADIOLOGY | Facility: EXTERNAL LOCATION | Age: 80
Discharge: HOME | End: 2025-04-07

## 2025-04-07 DIAGNOSIS — M25.511 RIGHT SHOULDER PAIN, UNSPECIFIED CHRONICITY: ICD-10-CM

## 2025-04-07 DIAGNOSIS — M17.0 BILATERAL PRIMARY OSTEOARTHRITIS OF KNEE: ICD-10-CM

## 2025-04-07 PROCEDURE — 20611 DRAIN/INJ JOINT/BURSA W/US: CPT | Performed by: FAMILY MEDICINE

## 2025-04-07 NOTE — PROGRESS NOTES
Patient ID: Sarah Manzo is a 79 y.o. female.    L Inj/Asp: R glenohumeral on 4/7/2025 2:46 PM  Indications: pain  Details: 22 G needle, ultrasound-guided posterior approach  Medications: 60 mg sodium hyaluronate 60 mg/3 mL  Outcome: tolerated well, no immediate complications  Procedure, treatment alternatives, risks and benefits explained, specific risks discussed. Consent was given by the patient. Immediately prior to procedure a time out was called to verify the correct patient, procedure, equipment, support staff and site/side marked as required. Patient was prepped and draped in the usual sterile fashion.       L Inj/Asp: bilateral knee on 4/7/2025 2:46 PM  Indications: pain and joint swelling  Details: 22 G needle, ultrasound-guided superolateral approach  Medications (Right): 16 mg hylan 16 mg/2 mL  Medications (Left): 16 mg hylan 16 mg/2 mL  Outcome: tolerated well, no immediate complications  Procedure, treatment alternatives, risks and benefits explained, specific risks discussed. Consent was given by the patient. Immediately prior to procedure a time out was called to verify the correct patient, procedure, equipment, support staff and site/side marked as required. Patient was prepped and draped in the usual sterile fashion.           Patient presents here today for the second of 3 Synvisc injections to the bilateral knees.  She is also here today for a trial of gel shot to her right shoulder.    She tolerated all 3 injections well.  Will see her back in 2 weeks for third and final injection to the bilateral knees.  We also discussed possibility of off  bracing going forward she can also consider doing perhaps some simple slide on compression sleeves or the Rolly copper type compression knee brace if she would like.  We will discuss offloader bracing at follow-up visit.  Routine postprocedure precautions provided.    At the conclusion of the visit there were no further questions by the  patient/family regarding their plan of care.  Patient was instructed to call or return with any issues, questions, or concerns regarding their injury and/or treatment plan described above.    This note was prepared using voice recognition software.  The details of this note are correct and have been reviewed, and corrected to the best of my ability.  Some grammatical errors may persist related to the Dragon software     Cole C. Budinsky, MD  Office: [phone]

## 2025-04-08 ENCOUNTER — HOSPITAL ENCOUNTER (INPATIENT)
Facility: HOSPITAL | Age: 80
LOS: 3 days | Discharge: HOME | DRG: 291 | End: 2025-04-12
Attending: STUDENT IN AN ORGANIZED HEALTH CARE EDUCATION/TRAINING PROGRAM | Admitting: STUDENT IN AN ORGANIZED HEALTH CARE EDUCATION/TRAINING PROGRAM
Payer: COMMERCIAL

## 2025-04-08 ENCOUNTER — APPOINTMENT (OUTPATIENT)
Dept: RADIOLOGY | Facility: HOSPITAL | Age: 80
DRG: 291 | End: 2025-04-08
Payer: COMMERCIAL

## 2025-04-08 ENCOUNTER — APPOINTMENT (OUTPATIENT)
Dept: CARDIOLOGY | Facility: HOSPITAL | Age: 80
DRG: 291 | End: 2025-04-08
Payer: COMMERCIAL

## 2025-04-08 DIAGNOSIS — I50.33 ACUTE ON CHRONIC DIASTOLIC HEART FAILURE: ICD-10-CM

## 2025-04-08 DIAGNOSIS — I10 PRIMARY HYPERTENSION: ICD-10-CM

## 2025-04-08 DIAGNOSIS — I13.0 HEART FAILURE AND KIDNEY DISEASE DUE TO HIGH BLOOD PRESSURE: ICD-10-CM

## 2025-04-08 DIAGNOSIS — R53.1 WEAKNESS: ICD-10-CM

## 2025-04-08 DIAGNOSIS — I15.0 RENOVASCULAR HYPERTENSION: ICD-10-CM

## 2025-04-08 DIAGNOSIS — R94.31 ECG ABNORMALITY: ICD-10-CM

## 2025-04-08 DIAGNOSIS — I10 ESSENTIAL HYPERTENSION: ICD-10-CM

## 2025-04-08 DIAGNOSIS — N17.9 AKI (ACUTE KIDNEY INJURY): ICD-10-CM

## 2025-04-08 DIAGNOSIS — R60.0 LOCALIZED EDEMA: ICD-10-CM

## 2025-04-08 DIAGNOSIS — E87.70 HYPERVOLEMIA, UNSPECIFIED HYPERVOLEMIA TYPE: ICD-10-CM

## 2025-04-08 DIAGNOSIS — I50.9 HEART FAILURE, UNSPECIFIED: ICD-10-CM

## 2025-04-08 DIAGNOSIS — R26.2 AMBULATORY DYSFUNCTION: Primary | ICD-10-CM

## 2025-04-08 DIAGNOSIS — Z86.718 HISTORY OF DVT (DEEP VEIN THROMBOSIS): ICD-10-CM

## 2025-04-08 DIAGNOSIS — N18.4 CKD (CHRONIC KIDNEY DISEASE) STAGE 4, GFR 15-29 ML/MIN (MULTI): ICD-10-CM

## 2025-04-08 DIAGNOSIS — E11.40 TYPE 2 DIABETES MELLITUS WITH DIABETIC NEUROPATHY, WITHOUT LONG-TERM CURRENT USE OF INSULIN: ICD-10-CM

## 2025-04-08 LAB
ALBUMIN SERPL BCP-MCNC: 3.5 G/DL (ref 3.4–5)
ALP SERPL-CCNC: 77 U/L (ref 33–136)
ALT SERPL W P-5'-P-CCNC: 11 U/L (ref 7–45)
ANION GAP SERPL CALC-SCNC: 11 MMOL/L (ref 10–20)
AST SERPL W P-5'-P-CCNC: 11 U/L (ref 9–39)
BASOPHILS # BLD AUTO: 0.04 X10*3/UL (ref 0–0.1)
BASOPHILS NFR BLD AUTO: 0.4 %
BILIRUB SERPL-MCNC: 0.3 MG/DL (ref 0–1.2)
BUN SERPL-MCNC: 38 MG/DL (ref 6–23)
CALCIUM SERPL-MCNC: 8.7 MG/DL (ref 8.6–10.3)
CARDIAC TROPONIN I PNL SERPL HS: 17 NG/L (ref 0–13)
CARDIAC TROPONIN I PNL SERPL HS: 18 NG/L (ref 0–13)
CHLORIDE SERPL-SCNC: 109 MMOL/L (ref 98–107)
CO2 SERPL-SCNC: 26 MMOL/L (ref 21–32)
CREAT SERPL-MCNC: 1.62 MG/DL (ref 0.5–1.05)
EGFRCR SERPLBLD CKD-EPI 2021: 32 ML/MIN/1.73M*2
EOSINOPHIL # BLD AUTO: 0.28 X10*3/UL (ref 0–0.4)
EOSINOPHIL NFR BLD AUTO: 2.5 %
ERYTHROCYTE [DISTWIDTH] IN BLOOD BY AUTOMATED COUNT: 13.3 % (ref 11.5–14.5)
GLUCOSE SERPL-MCNC: 219 MG/DL (ref 74–99)
HCT VFR BLD AUTO: 32.3 % (ref 36–46)
HGB BLD-MCNC: 10.5 G/DL (ref 12–16)
IMM GRANULOCYTES # BLD AUTO: 0.04 X10*3/UL (ref 0–0.5)
IMM GRANULOCYTES NFR BLD AUTO: 0.4 % (ref 0–0.9)
INR PPP: 1 (ref 0.9–1.1)
LACTATE SERPL-SCNC: 1.1 MMOL/L (ref 0.4–2)
LYMPHOCYTES # BLD AUTO: 1.74 X10*3/UL (ref 0.8–3)
LYMPHOCYTES NFR BLD AUTO: 15.8 %
MAGNESIUM SERPL-MCNC: 2.17 MG/DL (ref 1.6–2.4)
MCH RBC QN AUTO: 30.9 PG (ref 26–34)
MCHC RBC AUTO-ENTMCNC: 32.5 G/DL (ref 32–36)
MCV RBC AUTO: 95 FL (ref 80–100)
MONOCYTES # BLD AUTO: 1.02 X10*3/UL (ref 0.05–0.8)
MONOCYTES NFR BLD AUTO: 9.3 %
NEUTROPHILS # BLD AUTO: 7.9 X10*3/UL (ref 1.6–5.5)
NEUTROPHILS NFR BLD AUTO: 71.6 %
NRBC BLD-RTO: 0 /100 WBCS (ref 0–0)
PLATELET # BLD AUTO: 227 X10*3/UL (ref 150–450)
POTASSIUM SERPL-SCNC: 4.5 MMOL/L (ref 3.5–5.3)
PROT SERPL-MCNC: 6.9 G/DL (ref 6.4–8.2)
PROTHROMBIN TIME: 10.8 SECONDS (ref 9.8–12.4)
RBC # BLD AUTO: 3.4 X10*6/UL (ref 4–5.2)
SODIUM SERPL-SCNC: 141 MMOL/L (ref 136–145)
WBC # BLD AUTO: 11 X10*3/UL (ref 4.4–11.3)

## 2025-04-08 PROCEDURE — 86140 C-REACTIVE PROTEIN: CPT | Performed by: NURSE PRACTITIONER

## 2025-04-08 PROCEDURE — 36415 COLL VENOUS BLD VENIPUNCTURE: CPT | Performed by: NURSE PRACTITIONER

## 2025-04-08 PROCEDURE — 71045 X-RAY EXAM CHEST 1 VIEW: CPT

## 2025-04-08 PROCEDURE — 80053 COMPREHEN METABOLIC PANEL: CPT | Performed by: NURSE PRACTITIONER

## 2025-04-08 PROCEDURE — 85025 COMPLETE CBC W/AUTO DIFF WBC: CPT | Performed by: NURSE PRACTITIONER

## 2025-04-08 PROCEDURE — 71045 X-RAY EXAM CHEST 1 VIEW: CPT | Performed by: RADIOLOGY

## 2025-04-08 PROCEDURE — 93005 ELECTROCARDIOGRAM TRACING: CPT

## 2025-04-08 PROCEDURE — 85610 PROTHROMBIN TIME: CPT | Performed by: NURSE PRACTITIONER

## 2025-04-08 PROCEDURE — 93971 EXTREMITY STUDY: CPT | Mod: FOREIGN READ | Performed by: RADIOLOGY

## 2025-04-08 PROCEDURE — 84484 ASSAY OF TROPONIN QUANT: CPT | Performed by: NURSE PRACTITIONER

## 2025-04-08 PROCEDURE — 99285 EMERGENCY DEPT VISIT HI MDM: CPT | Mod: 25 | Performed by: STUDENT IN AN ORGANIZED HEALTH CARE EDUCATION/TRAINING PROGRAM

## 2025-04-08 PROCEDURE — 83880 ASSAY OF NATRIURETIC PEPTIDE: CPT | Performed by: NURSE PRACTITIONER

## 2025-04-08 PROCEDURE — 83605 ASSAY OF LACTIC ACID: CPT | Performed by: NURSE PRACTITIONER

## 2025-04-08 PROCEDURE — 2500000004 HC RX 250 GENERAL PHARMACY W/ HCPCS (ALT 636 FOR OP/ED): Performed by: NURSE PRACTITIONER

## 2025-04-08 PROCEDURE — 93970 EXTREMITY STUDY: CPT

## 2025-04-08 PROCEDURE — 83735 ASSAY OF MAGNESIUM: CPT | Performed by: NURSE PRACTITIONER

## 2025-04-08 PROCEDURE — 96374 THER/PROPH/DIAG INJ IV PUSH: CPT

## 2025-04-08 RX ORDER — LABETALOL HYDROCHLORIDE 5 MG/ML
20 INJECTION, SOLUTION INTRAVENOUS ONCE
Status: COMPLETED | OUTPATIENT
Start: 2025-04-08 | End: 2025-04-08

## 2025-04-08 RX ADMIN — Medication 20 MG: at 23:03

## 2025-04-08 ASSESSMENT — LIFESTYLE VARIABLES
HAVE PEOPLE ANNOYED YOU BY CRITICIZING YOUR DRINKING: NO
EVER FELT BAD OR GUILTY ABOUT YOUR DRINKING: NO
TOTAL SCORE: 0
HAVE YOU EVER FELT YOU SHOULD CUT DOWN ON YOUR DRINKING: NO
EVER HAD A DRINK FIRST THING IN THE MORNING TO STEADY YOUR NERVES TO GET RID OF A HANGOVER: NO

## 2025-04-08 ASSESSMENT — PAIN - FUNCTIONAL ASSESSMENT: PAIN_FUNCTIONAL_ASSESSMENT: 0-10

## 2025-04-08 ASSESSMENT — PAIN SCALES - GENERAL
PAINLEVEL_OUTOF10: 10 - WORST POSSIBLE PAIN
PAINLEVEL_OUTOF10: 7

## 2025-04-08 ASSESSMENT — COLUMBIA-SUICIDE SEVERITY RATING SCALE - C-SSRS
6. HAVE YOU EVER DONE ANYTHING, STARTED TO DO ANYTHING, OR PREPARED TO DO ANYTHING TO END YOUR LIFE?: NO
2. HAVE YOU ACTUALLY HAD ANY THOUGHTS OF KILLING YOURSELF?: NO
1. IN THE PAST MONTH, HAVE YOU WISHED YOU WERE DEAD OR WISHED YOU COULD GO TO SLEEP AND NOT WAKE UP?: NO

## 2025-04-08 ASSESSMENT — PAIN DESCRIPTION - ORIENTATION: ORIENTATION: RIGHT;LEFT

## 2025-04-08 ASSESSMENT — PAIN DESCRIPTION - LOCATION: LOCATION: KNEE

## 2025-04-09 ENCOUNTER — APPOINTMENT (OUTPATIENT)
Dept: RADIOLOGY | Facility: HOSPITAL | Age: 80
DRG: 291 | End: 2025-04-09
Payer: COMMERCIAL

## 2025-04-09 ENCOUNTER — APPOINTMENT (OUTPATIENT)
Dept: CARDIOLOGY | Facility: HOSPITAL | Age: 80
DRG: 291 | End: 2025-04-09
Payer: COMMERCIAL

## 2025-04-09 PROBLEM — R26.2 AMBULATORY DYSFUNCTION: Status: ACTIVE | Noted: 2025-04-09

## 2025-04-09 LAB
ANION GAP SERPL CALC-SCNC: 11 MMOL/L (ref 10–20)
AORTIC VALVE MEAN GRADIENT: 7 MMHG
AORTIC VALVE PEAK VELOCITY: 1.79 M/S
ATRIAL RATE: 79 BPM
AV PEAK GRADIENT: 13 MMHG
AVA (PEAK VEL): 1.98 CM2
AVA (VTI): 1.91 CM2
BNP SERPL-MCNC: 302 PG/ML (ref 0–99)
BUN SERPL-MCNC: 34 MG/DL (ref 6–23)
CALCIUM SERPL-MCNC: 8.6 MG/DL (ref 8.6–10.3)
CHLORIDE SERPL-SCNC: 108 MMOL/L (ref 98–107)
CO2 SERPL-SCNC: 26 MMOL/L (ref 21–32)
CREAT SERPL-MCNC: 1.59 MG/DL (ref 0.5–1.05)
CRP SERPL-MCNC: 7.92 MG/DL
EGFRCR SERPLBLD CKD-EPI 2021: 33 ML/MIN/1.73M*2
EJECTION FRACTION APICAL 4 CHAMBER: 47.3
EJECTION FRACTION: 50 %
ERYTHROCYTE [DISTWIDTH] IN BLOOD BY AUTOMATED COUNT: 13.2 % (ref 11.5–14.5)
GLUCOSE BLD MANUAL STRIP-MCNC: 118 MG/DL (ref 74–99)
GLUCOSE BLD MANUAL STRIP-MCNC: 148 MG/DL (ref 74–99)
GLUCOSE BLD MANUAL STRIP-MCNC: 159 MG/DL (ref 74–99)
GLUCOSE BLD MANUAL STRIP-MCNC: 259 MG/DL (ref 74–99)
GLUCOSE SERPL-MCNC: 151 MG/DL (ref 74–99)
HCT VFR BLD AUTO: 31 % (ref 36–46)
HGB BLD-MCNC: 10.1 G/DL (ref 12–16)
LEFT ATRIUM VOLUME AREA LENGTH INDEX BSA: 24.5 ML/M2
LEFT VENTRICLE INTERNAL DIMENSION DIASTOLE: 4.68 CM (ref 3.5–6)
LEFT VENTRICULAR OUTFLOW TRACT DIAMETER: 2 CM
LV EJECTION FRACTION BIPLANE: 47 %
MCH RBC QN AUTO: 30.8 PG (ref 26–34)
MCHC RBC AUTO-ENTMCNC: 32.6 G/DL (ref 32–36)
MCV RBC AUTO: 95 FL (ref 80–100)
MITRAL VALVE E/A RATIO: 0.65
NRBC BLD-RTO: 0 /100 WBCS (ref 0–0)
P AXIS: 57 DEGREES
P OFFSET: 190 MS
P ONSET: 130 MS
PLATELET # BLD AUTO: 211 X10*3/UL (ref 150–450)
POTASSIUM SERPL-SCNC: 4.1 MMOL/L (ref 3.5–5.3)
PR INTERVAL: 182 MS
Q ONSET: 221 MS
QRS COUNT: 12 BEATS
QRS DURATION: 86 MS
QT INTERVAL: 394 MS
QTC CALCULATION(BAZETT): 451 MS
QTC FREDERICIA: 432 MS
R AXIS: 55 DEGREES
RBC # BLD AUTO: 3.28 X10*6/UL (ref 4–5.2)
RIGHT VENTRICLE FREE WALL PEAK S': 13.3 CM/S
SODIUM SERPL-SCNC: 141 MMOL/L (ref 136–145)
T AXIS: 79 DEGREES
T OFFSET: 418 MS
TRICUSPID ANNULAR PLANE SYSTOLIC EXCURSION: 2.5 CM
VENTRICULAR RATE: 79 BPM
WBC # BLD AUTO: 9.6 X10*3/UL (ref 4.4–11.3)

## 2025-04-09 PROCEDURE — 2500000001 HC RX 250 WO HCPCS SELF ADMINISTERED DRUGS (ALT 637 FOR MEDICARE OP): Performed by: NURSE PRACTITIONER

## 2025-04-09 PROCEDURE — C8929 TTE W OR WO FOL WCON,DOPPLER: HCPCS

## 2025-04-09 PROCEDURE — 36415 COLL VENOUS BLD VENIPUNCTURE: CPT | Performed by: NURSE PRACTITIONER

## 2025-04-09 PROCEDURE — 2500000004 HC RX 250 GENERAL PHARMACY W/ HCPCS (ALT 636 FOR OP/ED): Performed by: REGISTERED NURSE

## 2025-04-09 PROCEDURE — 2500000002 HC RX 250 W HCPCS SELF ADMINISTERED DRUGS (ALT 637 FOR MEDICARE OP, ALT 636 FOR OP/ED): Performed by: STUDENT IN AN ORGANIZED HEALTH CARE EDUCATION/TRAINING PROGRAM

## 2025-04-09 PROCEDURE — 82947 ASSAY GLUCOSE BLOOD QUANT: CPT

## 2025-04-09 PROCEDURE — 99232 SBSQ HOSP IP/OBS MODERATE 35: CPT | Performed by: REGISTERED NURSE

## 2025-04-09 PROCEDURE — 93306 TTE W/DOPPLER COMPLETE: CPT | Performed by: INTERNAL MEDICINE

## 2025-04-09 PROCEDURE — 73562 X-RAY EXAM OF KNEE 3: CPT | Mod: 50

## 2025-04-09 PROCEDURE — 99222 1ST HOSP IP/OBS MODERATE 55: CPT | Performed by: NURSE PRACTITIONER

## 2025-04-09 PROCEDURE — 97161 PT EVAL LOW COMPLEX 20 MIN: CPT | Mod: GP

## 2025-04-09 PROCEDURE — 1200000002 HC GENERAL ROOM WITH TELEMETRY DAILY

## 2025-04-09 PROCEDURE — 2500000004 HC RX 250 GENERAL PHARMACY W/ HCPCS (ALT 636 FOR OP/ED): Performed by: NURSE PRACTITIONER

## 2025-04-09 PROCEDURE — 80048 BASIC METABOLIC PNL TOTAL CA: CPT | Performed by: NURSE PRACTITIONER

## 2025-04-09 PROCEDURE — 2500000002 HC RX 250 W HCPCS SELF ADMINISTERED DRUGS (ALT 637 FOR MEDICARE OP, ALT 636 FOR OP/ED): Performed by: HOSPITALIST

## 2025-04-09 PROCEDURE — 2500000001 HC RX 250 WO HCPCS SELF ADMINISTERED DRUGS (ALT 637 FOR MEDICARE OP): Performed by: REGISTERED NURSE

## 2025-04-09 PROCEDURE — 97165 OT EVAL LOW COMPLEX 30 MIN: CPT | Mod: GO

## 2025-04-09 PROCEDURE — 85027 COMPLETE CBC AUTOMATED: CPT | Performed by: NURSE PRACTITIONER

## 2025-04-09 PROCEDURE — 73562 X-RAY EXAM OF KNEE 3: CPT | Mod: BILATERAL PROCEDURE | Performed by: STUDENT IN AN ORGANIZED HEALTH CARE EDUCATION/TRAINING PROGRAM

## 2025-04-09 PROCEDURE — 2500000001 HC RX 250 WO HCPCS SELF ADMINISTERED DRUGS (ALT 637 FOR MEDICARE OP): Performed by: STUDENT IN AN ORGANIZED HEALTH CARE EDUCATION/TRAINING PROGRAM

## 2025-04-09 RX ORDER — LABETALOL HYDROCHLORIDE 5 MG/ML
20 INJECTION, SOLUTION INTRAVENOUS EVERY 6 HOURS PRN
Status: DISCONTINUED | OUTPATIENT
Start: 2025-04-09 | End: 2025-04-09

## 2025-04-09 RX ORDER — NAPROXEN SODIUM 220 MG/1
324 TABLET, FILM COATED ORAL ONCE
Status: COMPLETED | OUTPATIENT
Start: 2025-04-09 | End: 2025-04-09

## 2025-04-09 RX ORDER — FUROSEMIDE 40 MG/1
40 TABLET ORAL ONCE
Status: COMPLETED | OUTPATIENT
Start: 2025-04-09 | End: 2025-04-09

## 2025-04-09 RX ORDER — DEXTROSE 50 % IN WATER (D50W) INTRAVENOUS SYRINGE
25
Status: DISCONTINUED | OUTPATIENT
Start: 2025-04-09 | End: 2025-04-12 | Stop reason: HOSPADM

## 2025-04-09 RX ORDER — DEXTROSE 50 % IN WATER (D50W) INTRAVENOUS SYRINGE
12.5
Status: DISCONTINUED | OUTPATIENT
Start: 2025-04-09 | End: 2025-04-12 | Stop reason: HOSPADM

## 2025-04-09 RX ORDER — AMLODIPINE BESYLATE 5 MG/1
10 TABLET ORAL DAILY
Status: DISCONTINUED | OUTPATIENT
Start: 2025-04-10 | End: 2025-04-09

## 2025-04-09 RX ORDER — AMLODIPINE BESYLATE 5 MG/1
5 TABLET ORAL DAILY
Status: DISCONTINUED | OUTPATIENT
Start: 2025-04-09 | End: 2025-04-09

## 2025-04-09 RX ORDER — AMLODIPINE BESYLATE 5 MG/1
5 TABLET ORAL DAILY
Status: DISCONTINUED | OUTPATIENT
Start: 2025-04-10 | End: 2025-04-10

## 2025-04-09 RX ORDER — TELMISARTAN 80 MG/1
80 TABLET ORAL DAILY
Status: DISCONTINUED | OUTPATIENT
Start: 2025-04-09 | End: 2025-04-09

## 2025-04-09 RX ORDER — HYDRALAZINE HYDROCHLORIDE 20 MG/ML
5 INJECTION INTRAMUSCULAR; INTRAVENOUS EVERY 6 HOURS PRN
Status: DISCONTINUED | OUTPATIENT
Start: 2025-04-09 | End: 2025-04-12 | Stop reason: HOSPADM

## 2025-04-09 RX ORDER — ACETAMINOPHEN 325 MG/1
650 TABLET ORAL EVERY 6 HOURS PRN
Status: DISCONTINUED | OUTPATIENT
Start: 2025-04-09 | End: 2025-04-12 | Stop reason: HOSPADM

## 2025-04-09 RX ORDER — ASPIRIN 81 MG/1
81 TABLET ORAL NIGHTLY
COMMUNITY

## 2025-04-09 RX ORDER — SIMVASTATIN 40 MG/1
80 TABLET, FILM COATED ORAL NIGHTLY
Status: DISCONTINUED | OUTPATIENT
Start: 2025-04-09 | End: 2025-04-12 | Stop reason: HOSPADM

## 2025-04-09 RX ORDER — FUROSEMIDE 10 MG/ML
20 INJECTION INTRAMUSCULAR; INTRAVENOUS EVERY 12 HOURS
Status: DISCONTINUED | OUTPATIENT
Start: 2025-04-09 | End: 2025-04-11

## 2025-04-09 RX ORDER — HEPARIN SODIUM 5000 [USP'U]/ML
5000 INJECTION, SOLUTION INTRAVENOUS; SUBCUTANEOUS EVERY 8 HOURS
Status: DISCONTINUED | OUTPATIENT
Start: 2025-04-09 | End: 2025-04-12 | Stop reason: HOSPADM

## 2025-04-09 RX ORDER — INSULIN LISPRO 100 [IU]/ML
0-5 INJECTION, SOLUTION INTRAVENOUS; SUBCUTANEOUS
Status: DISCONTINUED | OUTPATIENT
Start: 2025-04-09 | End: 2025-04-12 | Stop reason: HOSPADM

## 2025-04-09 RX ORDER — ASPIRIN 81 MG/1
81 TABLET ORAL NIGHTLY
Status: DISCONTINUED | OUTPATIENT
Start: 2025-04-09 | End: 2025-04-12 | Stop reason: HOSPADM

## 2025-04-09 RX ORDER — POLYETHYLENE GLYCOL 3350 17 G/17G
17 POWDER, FOR SOLUTION ORAL DAILY PRN
Status: DISCONTINUED | OUTPATIENT
Start: 2025-04-09 | End: 2025-04-12 | Stop reason: HOSPADM

## 2025-04-09 RX ORDER — FUROSEMIDE 10 MG/ML
20 INJECTION INTRAMUSCULAR; INTRAVENOUS ONCE
Status: COMPLETED | OUTPATIENT
Start: 2025-04-09 | End: 2025-04-09

## 2025-04-09 RX ORDER — VALSARTAN 160 MG/1
160 TABLET ORAL DAILY
Status: DISCONTINUED | OUTPATIENT
Start: 2025-04-09 | End: 2025-04-10

## 2025-04-09 RX ORDER — ACETAMINOPHEN 500 MG
10 TABLET ORAL NIGHTLY PRN
Status: DISCONTINUED | OUTPATIENT
Start: 2025-04-09 | End: 2025-04-12 | Stop reason: HOSPADM

## 2025-04-09 RX ADMIN — Medication 20 MG: at 04:18

## 2025-04-09 RX ADMIN — HEPARIN SODIUM 5000 UNITS: 5000 INJECTION INTRAVENOUS; SUBCUTANEOUS at 04:18

## 2025-04-09 RX ADMIN — FUROSEMIDE 20 MG: 10 INJECTION, SOLUTION INTRAMUSCULAR; INTRAVENOUS at 08:28

## 2025-04-09 RX ADMIN — FUROSEMIDE 20 MG: 10 INJECTION, SOLUTION INTRAMUSCULAR; INTRAVENOUS at 00:49

## 2025-04-09 RX ADMIN — VALSARTAN 160 MG: 160 TABLET, FILM COATED ORAL at 10:09

## 2025-04-09 RX ADMIN — ACETAMINOPHEN 650 MG: 325 TABLET ORAL at 04:18

## 2025-04-09 RX ADMIN — HYDRALAZINE HYDROCHLORIDE 5 MG: 20 INJECTION INTRAMUSCULAR; INTRAVENOUS at 16:21

## 2025-04-09 RX ADMIN — ASPIRIN 81 MG: 81 TABLET, COATED ORAL at 20:29

## 2025-04-09 RX ADMIN — AMLODIPINE BESYLATE 5 MG: 5 TABLET ORAL at 08:28

## 2025-04-09 RX ADMIN — HEPARIN SODIUM 5000 UNITS: 5000 INJECTION INTRAVENOUS; SUBCUTANEOUS at 11:42

## 2025-04-09 RX ADMIN — SIMVASTATIN 80 MG: 40 TABLET, FILM COATED ORAL at 20:29

## 2025-04-09 RX ADMIN — ACETAMINOPHEN 650 MG: 325 TABLET ORAL at 17:29

## 2025-04-09 RX ADMIN — ASPIRIN 324 MG: 81 TABLET, CHEWABLE ORAL at 00:47

## 2025-04-09 RX ADMIN — PERFLUTREN 3 ML OF DILUTION: 6.52 INJECTION, SUSPENSION INTRAVENOUS at 14:22

## 2025-04-09 RX ADMIN — FUROSEMIDE 40 MG: 40 TABLET ORAL at 16:21

## 2025-04-09 RX ADMIN — FUROSEMIDE 20 MG: 10 INJECTION, SOLUTION INTRAMUSCULAR; INTRAVENOUS at 23:13

## 2025-04-09 RX ADMIN — EMPAGLIFLOZIN 10 MG: 10 TABLET, FILM COATED ORAL at 08:28

## 2025-04-09 RX ADMIN — HEPARIN SODIUM 5000 UNITS: 5000 INJECTION INTRAVENOUS; SUBCUTANEOUS at 20:29

## 2025-04-09 SDOH — ECONOMIC STABILITY: FOOD INSECURITY: WITHIN THE PAST 12 MONTHS, THE FOOD YOU BOUGHT JUST DIDN'T LAST AND YOU DIDN'T HAVE MONEY TO GET MORE.: NEVER TRUE

## 2025-04-09 SDOH — ECONOMIC STABILITY: FOOD INSECURITY: WITHIN THE PAST 12 MONTHS, YOU WORRIED THAT YOUR FOOD WOULD RUN OUT BEFORE YOU GOT THE MONEY TO BUY MORE.: NEVER TRUE

## 2025-04-09 SDOH — SOCIAL STABILITY: SOCIAL INSECURITY: HAVE YOU HAD THOUGHTS OF HARMING ANYONE ELSE?: NO

## 2025-04-09 SDOH — ECONOMIC STABILITY: HOUSING INSECURITY: IN THE PAST 12 MONTHS, HOW MANY TIMES HAVE YOU MOVED WHERE YOU WERE LIVING?: 1

## 2025-04-09 SDOH — SOCIAL STABILITY: SOCIAL INSECURITY: WITHIN THE LAST YEAR, HAVE YOU BEEN AFRAID OF YOUR PARTNER OR EX-PARTNER?: NO

## 2025-04-09 SDOH — ECONOMIC STABILITY: INCOME INSECURITY: IN THE PAST 12 MONTHS HAS THE ELECTRIC, GAS, OIL, OR WATER COMPANY THREATENED TO SHUT OFF SERVICES IN YOUR HOME?: YES

## 2025-04-09 SDOH — SOCIAL STABILITY: SOCIAL INSECURITY: WERE YOU ABLE TO COMPLETE ALL THE BEHAVIORAL HEALTH SCREENINGS?: YES

## 2025-04-09 SDOH — SOCIAL STABILITY: SOCIAL INSECURITY
WITHIN THE LAST YEAR, HAVE YOU BEEN RAPED OR FORCED TO HAVE ANY KIND OF SEXUAL ACTIVITY BY YOUR PARTNER OR EX-PARTNER?: NO

## 2025-04-09 SDOH — ECONOMIC STABILITY: FOOD INSECURITY: HOW HARD IS IT FOR YOU TO PAY FOR THE VERY BASICS LIKE FOOD, HOUSING, MEDICAL CARE, AND HEATING?: HARD

## 2025-04-09 SDOH — SOCIAL STABILITY: SOCIAL INSECURITY: ARE THERE ANY APPARENT SIGNS OF INJURIES/BEHAVIORS THAT COULD BE RELATED TO ABUSE/NEGLECT?: NO

## 2025-04-09 SDOH — SOCIAL STABILITY: SOCIAL INSECURITY: WITHIN THE LAST YEAR, HAVE YOU BEEN HUMILIATED OR EMOTIONALLY ABUSED IN OTHER WAYS BY YOUR PARTNER OR EX-PARTNER?: NO

## 2025-04-09 SDOH — SOCIAL STABILITY: SOCIAL INSECURITY: DO YOU FEEL UNSAFE GOING BACK TO THE PLACE WHERE YOU ARE LIVING?: NO

## 2025-04-09 SDOH — SOCIAL STABILITY: SOCIAL INSECURITY: DO YOU FEEL ANYONE HAS EXPLOITED OR TAKEN ADVANTAGE OF YOU FINANCIALLY OR OF YOUR PERSONAL PROPERTY?: NO

## 2025-04-09 SDOH — ECONOMIC STABILITY: HOUSING INSECURITY: AT ANY TIME IN THE PAST 12 MONTHS, WERE YOU HOMELESS OR LIVING IN A SHELTER (INCLUDING NOW)?: NO

## 2025-04-09 SDOH — SOCIAL STABILITY: SOCIAL INSECURITY: ARE YOU OR HAVE YOU BEEN THREATENED OR ABUSED PHYSICALLY, EMOTIONALLY, OR SEXUALLY BY ANYONE?: NO

## 2025-04-09 SDOH — ECONOMIC STABILITY: HOUSING INSECURITY: IN THE LAST 12 MONTHS, WAS THERE A TIME WHEN YOU WERE NOT ABLE TO PAY THE MORTGAGE OR RENT ON TIME?: NO

## 2025-04-09 SDOH — SOCIAL STABILITY: SOCIAL INSECURITY: HAS ANYONE EVER THREATENED TO HURT YOUR FAMILY OR YOUR PETS?: NO

## 2025-04-09 SDOH — SOCIAL STABILITY: SOCIAL INSECURITY
WITHIN THE LAST YEAR, HAVE YOU BEEN KICKED, HIT, SLAPPED, OR OTHERWISE PHYSICALLY HURT BY YOUR PARTNER OR EX-PARTNER?: NO

## 2025-04-09 SDOH — SOCIAL STABILITY: SOCIAL INSECURITY: DOES ANYONE TRY TO KEEP YOU FROM HAVING/CONTACTING OTHER FRIENDS OR DOING THINGS OUTSIDE YOUR HOME?: NO

## 2025-04-09 SDOH — ECONOMIC STABILITY: TRANSPORTATION INSECURITY: IN THE PAST 12 MONTHS, HAS LACK OF TRANSPORTATION KEPT YOU FROM MEDICAL APPOINTMENTS OR FROM GETTING MEDICATIONS?: NO

## 2025-04-09 SDOH — SOCIAL STABILITY: SOCIAL INSECURITY: ABUSE: ADULT

## 2025-04-09 ASSESSMENT — LIFESTYLE VARIABLES
HOW OFTEN DO YOU HAVE A DRINK CONTAINING ALCOHOL: NEVER
HOW OFTEN DO YOU HAVE 6 OR MORE DRINKS ON ONE OCCASION: NEVER
AUDIT-C TOTAL SCORE: 0
AUDIT-C TOTAL SCORE: 0
HOW OFTEN DO YOU HAVE A DRINK CONTAINING ALCOHOL: NEVER
HOW MANY STANDARD DRINKS CONTAINING ALCOHOL DO YOU HAVE ON A TYPICAL DAY: PATIENT DOES NOT DRINK
SKIP TO QUESTIONS 9-10: 1
HOW OFTEN DO YOU HAVE 6 OR MORE DRINKS ON ONE OCCASION: NEVER
HOW MANY STANDARD DRINKS CONTAINING ALCOHOL DO YOU HAVE ON A TYPICAL DAY: PATIENT DOES NOT DRINK
SKIP TO QUESTIONS 9-10: 1
AUDIT-C TOTAL SCORE: 0
AUDIT-C TOTAL SCORE: 0

## 2025-04-09 ASSESSMENT — COGNITIVE AND FUNCTIONAL STATUS - GENERAL
EATING MEALS: A LITTLE
WALKING IN HOSPITAL ROOM: A LOT
STANDING UP FROM CHAIR USING ARMS: A LITTLE
DRESSING REGULAR UPPER BODY CLOTHING: A LITTLE
MOBILITY SCORE: 15
TOILETING: A LITTLE
HELP NEEDED FOR BATHING: A LITTLE
DAILY ACTIVITIY SCORE: 13
MOBILITY SCORE: 17
WALKING IN HOSPITAL ROOM: A LOT
DAILY ACTIVITIY SCORE: 20
CLIMB 3 TO 5 STEPS WITH RAILING: TOTAL
DRESSING REGULAR UPPER BODY CLOTHING: A LITTLE
MOVING TO AND FROM BED TO CHAIR: A LOT
DAILY ACTIVITIY SCORE: 20
TURNING FROM BACK TO SIDE WHILE IN FLAT BAD: A LITTLE
STANDING UP FROM CHAIR USING ARMS: A LOT
MOVING FROM LYING ON BACK TO SITTING ON SIDE OF FLAT BED WITH BEDRAILS: A LITTLE
MOVING TO AND FROM BED TO CHAIR: A LOT
TOILETING: A LITTLE
HELP NEEDED FOR BATHING: A LOT
STANDING UP FROM CHAIR USING ARMS: A LOT
HELP NEEDED FOR BATHING: A LITTLE
TOILETING: TOTAL
CLIMB 3 TO 5 STEPS WITH RAILING: TOTAL
TURNING FROM BACK TO SIDE WHILE IN FLAT BAD: A LITTLE
DRESSING REGULAR LOWER BODY CLOTHING: A LITTLE
PERSONAL GROOMING: A LITTLE
DRESSING REGULAR UPPER BODY CLOTHING: A LITTLE
PATIENT BASELINE BEDBOUND: NO
MOVING TO AND FROM BED TO CHAIR: A LITTLE
DRESSING REGULAR LOWER BODY CLOTHING: TOTAL
MOBILITY SCORE: 13
WALKING IN HOSPITAL ROOM: A LOT
DRESSING REGULAR LOWER BODY CLOTHING: A LITTLE
CLIMB 3 TO 5 STEPS WITH RAILING: A LOT

## 2025-04-09 ASSESSMENT — PAIN SCALES - GENERAL
PAINLEVEL_OUTOF10: 6
PAINLEVEL_OUTOF10: 4
PAINLEVEL_OUTOF10: 10 - WORST POSSIBLE PAIN
PAINLEVEL_OUTOF10: 3
PAINLEVEL_OUTOF10: 5 - MODERATE PAIN
PAINLEVEL_OUTOF10: 10 - WORST POSSIBLE PAIN

## 2025-04-09 ASSESSMENT — ACTIVITIES OF DAILY LIVING (ADL)
BATHING_ASSISTANCE: MAXIMAL
FEEDING YOURSELF: INDEPENDENT
WALKS IN HOME: DEPENDENT
JUDGMENT_ADEQUATE_SAFELY_COMPLETE_DAILY_ACTIVITIES: YES
DRESSING YOURSELF: NEEDS ASSISTANCE
ADEQUATE_TO_COMPLETE_ADL: YES
BATHING: NEEDS ASSISTANCE
LACK_OF_TRANSPORTATION: NO
PATIENT'S MEMORY ADEQUATE TO SAFELY COMPLETE DAILY ACTIVITIES?: YES
TOILETING: NEEDS ASSISTANCE
HEARING - LEFT EAR: FUNCTIONAL
LACK_OF_TRANSPORTATION: NO
GROOMING: NEEDS ASSISTANCE
LACK_OF_TRANSPORTATION: NO
ASSISTIVE_DEVICE: EYEGLASSES;DENTURES UPPER;DENTURES LOWER
HEARING - RIGHT EAR: FUNCTIONAL

## 2025-04-09 ASSESSMENT — ENCOUNTER SYMPTOMS
VOMITING: 0
FLANK PAIN: 0
CHILLS: 0
DYSURIA: 0
WEAKNESS: 1
CONSTIPATION: 0
HEMATURIA: 0
ARTHRALGIAS: 1
FREQUENCY: 0
ABDOMINAL PAIN: 0
SHORTNESS OF BREATH: 0
DIARRHEA: 0
FEVER: 0
PALPITATIONS: 0
NAUSEA: 0

## 2025-04-09 ASSESSMENT — PAIN DESCRIPTION - LOCATION: LOCATION: KNEE

## 2025-04-09 ASSESSMENT — PAIN - FUNCTIONAL ASSESSMENT
PAIN_FUNCTIONAL_ASSESSMENT: 0-10

## 2025-04-09 ASSESSMENT — PAIN DESCRIPTION - ORIENTATION: ORIENTATION: RIGHT;LEFT

## 2025-04-09 ASSESSMENT — PATIENT HEALTH QUESTIONNAIRE - PHQ9
SUM OF ALL RESPONSES TO PHQ9 QUESTIONS 1 & 2: 0
SUM OF ALL RESPONSES TO PHQ9 QUESTIONS 1 & 2: 0
1. LITTLE INTEREST OR PLEASURE IN DOING THINGS: NOT AT ALL
2. FEELING DOWN, DEPRESSED OR HOPELESS: NOT AT ALL
2. FEELING DOWN, DEPRESSED OR HOPELESS: NOT AT ALL
1. LITTLE INTEREST OR PLEASURE IN DOING THINGS: NOT AT ALL

## 2025-04-09 NOTE — PROGRESS NOTES
Micaela Neely is a 79 y.o. female on day 0 of admission presenting with Ambulatory dysfunction.      Subjective   Patient examined and seen. Alert and oriented x3, resting comfortably.  Patient denies chest pain, shortness of breath, palpitations, abdominal pain, fever or chills.      She does complain of bilateral knee pain which is new since her injections 1 week ago. She states no fever, chills redness to the area.   She reports that they are swollen, which has worsened.     On exam her feet to her knees are swollen with 1-2 pitting edema. She has had edema in the past, but this has worsened bilaterally. She also complains of neuropathy.     Hx of DVT in past provoked - negative ultrasound   Chronic Knee Pain        Objective     Last Recorded Vitals  BP (!) 193/86   Pulse 68   Temp 36.9 °C (98.4 °F)   Resp 18   Wt 105 kg (231 lb 7.7 oz)   SpO2 93%   Intake/Output last 3 Shifts:    Intake/Output Summary (Last 24 hours) at 4/9/2025 1559  Last data filed at 4/9/2025 1210  Gross per 24 hour   Intake --   Output 1500 ml   Net -1500 ml       Admission Weight  Weight: 97.5 kg (215 lb) (04/08/25 2032)    Daily Weight  04/09/25 : 105 kg (231 lb 7.7 oz)    Image Results  Transthoracic Echo (TTE) [place] [id number]   Tel [phone] Fax [phone]    TRANSTHORACIC ECHOCARDIOGRAM REPORT    Patient Name:       MICAELA NEELY       Reading Physician:    [id number] Hossein Murillo DO  Study Date:         4/9/2025            Ordering Provider:    [id number] GIRMA RANDLE  MRN/PID:            [id number]            Fellow:  Accession#:         [id number]        Nurse:                Tano Kwon RN  Date of Birth/Age:  1945 / 79      Sonographer:          Talia parks                                      [facility]                                                                [facility]  Gender Assigned at  F                   Additional Staff:  Birth:  Height:             172.72 cm           Admit Date:           4/8/2025  Weight:             104.78 kg           Admission Status:     Inpatient -                                                                Routine  BSA / BMI:          2.17 m2 / 35.12     Department Location:  [facility]                      kg/m2                                     Echo Lab  Blood Pressure: 201 /85 mmHg    Study Type:    TRANSTHORACIC ECHO (TTE) COMPLETE  Diagnosis/ICD: Localized edema-R60.0; Heart failure, unspecified-I50.9  Indication:    Edema, Heart failure  CPT Codes:     Echo Complete w Full [id number]    Patient History:  Diabetes:          Yes  BMI:               Obese >30  Pertinent History: HTN, CHF and LE Edema.    Study Detail: The following Echo studies were performed: 2D, M-Mode, Doppler and                color flow. Technically challenging study due to body habitus and                patient lying in supine position. Definity used as a contrast                agent for endocardial border definition. Total contrast used for                this procedure was 3 mL via IV push.       PHYSICIAN INTERPRETATION:  Left Ventricle: The left ventricular systolic function is mildly decreased, with a visually estimated ejection fraction of 50%. There is moderate concentric left ventricular hypertrophy. There are no regional wall motion abnormalities. The left ventricular cavity size is normal. There is moderately increased septal and moderately increased posterior left ventricular wall thickness. Spectral Doppler shows a Grade I (impaired relaxation pattern) of left ventricular diastolic filling with normal left atrial filling pressure.  LV Wall Scoring:  All segments are normal.    Left Atrium: The left atrial size is normal.  Right Ventricle: The right ventricle is  normal in size. There is normal right ventricular global systolic function.  Right Atrium: The right atrial size is normal.  Aortic Valve: The aortic valve appears structurally normal. There is mild aortic valve cusp calcification. There is no evidence of aortic valve stenosis.  The aortic valve dimensionless index is 0.61. There is no evidence of aortic valve regurgitation. The peak instantaneous gradient of the aortic valve is 13 mmHg. The mean gradient of the aortic valve is 7 mmHg.  Mitral Valve: The mitral valve is normal in structure. There is no evidence of mitral valve stenosis. There is normal mitral valve leaflet mobility. There is mild mitral annular calcification. There is trace mitral valve regurgitation.  Tricuspid Valve: The tricuspid valve is structurally normal. There is normal tricuspid valve leaflet mobility. There is trace to mild tricuspid regurgitation.  Pulmonic Valve: The pulmonic valve is not well visualized. There is no indication of pulmonic valve regurgitation.  Pericardium: No pericardial effusion noted.  Aorta: The aortic root is normal.  In comparison to the previous echocardiogram(s): There are no prior studies on this patient for comparison purposes.       CONCLUSIONS:   1. The left ventricular systolic function is mildly decreased, with a visually estimated ejection fraction of 50%.   2. No regional wall motion abnormalities.   3. Spectral Doppler shows a Grade I (impaired relaxation pattern) of left ventricular diastolic filling with normal left atrial filling pressure.   4. There is normal right ventricular global systolic function.   5. Normal sized right ventricle.   6. There is no evidence of mitral valve stenosis.   7. Trace mitral valve regurgitation.   8. Trace to mild tricuspid regurgitation.   9. Aortic valve stenosis is not present.  10. There is moderately increased septal and moderately increased posterior left ventricular wall  thickness.    RECOMMENDATIONS:  Technically suboptimal and limited study, therefore accuracy of above interpretation could be substantially diminished. Clinical correlation is advised. Consider additional imaging modalities if clinically indicated.       QUANTITATIVE DATA SUMMARY:     2D MEASUREMENTS:             Normal Ranges:  IVSd:            1.43 cm     (0.6-1.1cm)  LVPWd:           1.30 cm     (0.6-1.1cm)  LVIDd:           4.68 cm     (3.9-5.9cm)  LVIDs:           3.65 cm  LV Mass Index:   116.8 g/m2  LVEDV Index:     92.83 ml/m2  LV % FS          22.0 %       LEFT ATRIUM:                  Normal Ranges:  LA Vol A4C:        49.1 ml    (22+/-6mL/m2)  LA Vol A2C:        56.8 ml  LA Vol BP:         53.3 ml  LA Vol Index A4C:  22.6ml/m2  LA Vol Index A2C:  26.1 ml/m2  LA Vol Index BP:   24.5 ml/m2  LA Area A4C:       17.5 cm2  LA Area A2C:       19.0 cm2  LA Major Axis A4C: 5.3 cm  LA Major Axis A2C: 5.4 cm  LA Volume Index:   23.4 ml/m2       LV SYSTOLIC FUNCTION:                       Normal Ranges:  EF-A4C View:    47 % (>=55%)  EF-A2C View:    48 %  EF-Biplane:     47 %  EF-Visual:      50 %  LV EF Reported: 50 %       LV DIASTOLIC FUNCTION:           Normal Ranges:  MV Peak E:             0.79 m/s  (0.7-1.2 m/s)  MV Peak A:             1.21 m/s  (0.42-0.7 m/s)  E/A Ratio:             0.65      (1.0-2.2)  MV e'                  0.053 m/s (>8.0)  MV lateral e'          0.06 m/s  MV medial e'           0.05 m/s  E/e' Ratio:            14.74     (<8.0)       MITRAL VALVE:          Normal Ranges:  MV DT:        243 msec (150-240msec)       AORTIC VALVE:                      Normal Ranges:  AoV Vmax:                1.79 m/s  (<=1.7m/s)  AoV Peak P.8 mmHg (<20mmHg)  AoV Mean P.0 mmHg  (1.7-11.5mmHg)  LVOT Max Shyam:            1.13 m/s  (<=1.1m/s)  AoV VTI:                 43.00 cm  (18-25cm)  LVOT VTI:                26.10 cm  LVOT Diameter:           2.00 cm   (1.8-2.4cm)  AoV Area,  VTI:           1.91 cm2  (2.5-5.5cm2)  AoV Area,Vmax:           1.98 cm2  (2.5-4.5cm2)  AoV Dimensionless Index: 0.61       RIGHT VENTRICLE:  TAPSE: 25.0 mm  RV s'  0.13 m/s       PULMONIC VALVE:          Normal Ranges:  PV Accel Time:  98 msec  (>120ms)  PV Max Shyam:     1.0 m/s  (0.6-0.9m/s)  PV Max PG:      3.9 mmHg       [id number] Hossein Murillo   Electronically signed on 4/9/2025 at 2:59:40 PM       Wall Scoring       ** Final **  ECG 12 lead  Normal sinus rhythm  Normal ECG  When compared with ECG of 01-JUL-2022 09:48,  No significant change was found  See ED provider note for full interpretation and clinical correlation  Confirmed by Carole Fan ([id number]) on 4/9/2025 10:49:09 AM      Physical Exam    Constitutional: Well developed, awake/alert/oriented x3, , cooperative  Respiratory/Thorax: Patent airways,  normal breath sounds Cardiovascular: Regular, rate and rhythm, no murmurs, 2+ equal pulses of the extremities, normal S 1and S 2 + 1-2 pitting edema in lower extremities with anasarca to knees bilaterally  Gastrointestinal: Nondistended, soft, non-tender,  +BS x 4 quadrants  Genitourinary: voiding freely without CVA tenderness or suprabupic tenderness   Musculoskeletal: ROM intact, no joint swelling, no swelling of joints, knees are not erythemic. Swelling, but consistent to pedals, no redness noted + 1-2 edema   Extremities: normal extremities,  no contusions or wounds seen   Skin: warm, dry, intact  Neurological: alert/oriented x 3, speech clear,   Psychiatric: appropriate mood and behavior        Relevant Results  Scheduled medications  [START ON 4/10/2025] amLODIPine, 5 mg, oral, Daily  aspirin, 81 mg, oral, Nightly  empagliflozin, 10 mg, oral, Daily  furosemide, 20 mg, intravenous, q12h  heparin (porcine), 5,000 Units, subcutaneous, q8h  insulin lispro, 0-5 Units, subcutaneous, TID AC  simvastatin, 80 mg, oral, Nightly  valsartan, 160 mg, oral, Daily      Continuous medications     PRN  medications  PRN medications: acetaminophen, dextrose, dextrose, glucagon, glucagon, hydrALAZINE, melatonin, polyethylene glycol    Results for orders placed or performed during the hospital encounter of 04/08/25 (from the past 24 hours)   ECG 12 lead   Result Value Ref Range    Ventricular Rate 79 BPM    Atrial Rate 79 BPM    VT Interval 182 ms    QRS Duration 86 ms    QT Interval 394 ms    QTC Calculation(Bazett) 451 ms    P Axis 57 degrees    R Axis 55 degrees    T Axis 79 degrees    QRS Count 12 beats    Q Onset 221 ms    P Onset 130 ms    P Offset 190 ms    T Offset 418 ms    QTC Fredericia 432 ms   CBC and Auto Differential   Result Value Ref Range    WBC 11.0 4.4 - 11.3 x10*3/uL    nRBC 0.0 0.0 - 0.0 /100 WBCs    RBC 3.40 (L) 4.00 - 5.20 x10*6/uL    Hemoglobin 10.5 (L) 12.0 - 16.0 g/dL    Hematocrit 32.3 (L) 36.0 - 46.0 %    MCV 95 80 - 100 fL    MCH 30.9 26.0 - 34.0 pg    MCHC 32.5 32.0 - 36.0 g/dL    RDW 13.3 11.5 - 14.5 %    Platelets 227 150 - 450 x10*3/uL    Neutrophils % 71.6 40.0 - 80.0 %    Immature Granulocytes %, Automated 0.4 0.0 - 0.9 %    Lymphocytes % 15.8 13.0 - 44.0 %    Monocytes % 9.3 2.0 - 10.0 %    Eosinophils % 2.5 0.0 - 6.0 %    Basophils % 0.4 0.0 - 2.0 %    Neutrophils Absolute 7.90 (H) 1.60 - 5.50 x10*3/uL    Immature Granulocytes Absolute, Automated 0.04 0.00 - 0.50 x10*3/uL    Lymphocytes Absolute 1.74 0.80 - 3.00 x10*3/uL    Monocytes Absolute 1.02 (H) 0.05 - 0.80 x10*3/uL    Eosinophils Absolute 0.28 0.00 - 0.40 x10*3/uL    Basophils Absolute 0.04 0.00 - 0.10 x10*3/uL   Magnesium   Result Value Ref Range    Magnesium 2.17 1.60 - 2.40 mg/dL   Comprehensive metabolic panel   Result Value Ref Range    Glucose 219 (H) 74 - 99 mg/dL    Sodium 141 136 - 145 mmol/L    Potassium 4.5 3.5 - 5.3 mmol/L    Chloride 109 (H) 98 - 107 mmol/L    Bicarbonate 26 21 - 32 mmol/L    Anion Gap 11 10 - 20 mmol/L    Urea Nitrogen 38 (H) 6 - 23 mg/dL    Creatinine 1.62 (H) 0.50 - 1.05 mg/dL    eGFR 32 (L)  >60 mL/min/1.73m*2    Calcium 8.7 8.6 - 10.3 mg/dL    Albumin 3.5 3.4 - 5.0 g/dL    Alkaline Phosphatase 77 33 - 136 U/L    Total Protein 6.9 6.4 - 8.2 g/dL    AST 11 9 - 39 U/L    Bilirubin, Total 0.3 0.0 - 1.2 mg/dL    ALT 11 7 - 45 U/L   Lactate   Result Value Ref Range    Lactate 1.1 0.4 - 2.0 mmol/L   Protime-INR   Result Value Ref Range    Protime 10.8 9.8 - 12.4 seconds    INR 1.0 0.9 - 1.1   C-Reactive Protein   Result Value Ref Range    C-Reactive Protein 7.92 (H) <1.00 mg/dL   Troponin I, High Sensitivity   Result Value Ref Range    Troponin I, High Sensitivity 17 (H) 0 - 13 ng/L   B-Type Natriuretic Peptide   Result Value Ref Range     (H) 0 - 99 pg/mL   Troponin I, High Sensitivity   Result Value Ref Range    Troponin I, High Sensitivity 18 (H) 0 - 13 ng/L   Basic metabolic panel   Result Value Ref Range    Glucose 151 (H) 74 - 99 mg/dL    Sodium 141 136 - 145 mmol/L    Potassium 4.1 3.5 - 5.3 mmol/L    Chloride 108 (H) 98 - 107 mmol/L    Bicarbonate 26 21 - 32 mmol/L    Anion Gap 11 10 - 20 mmol/L    Urea Nitrogen 34 (H) 6 - 23 mg/dL    Creatinine 1.59 (H) 0.50 - 1.05 mg/dL    eGFR 33 (L) >60 mL/min/1.73m*2    Calcium 8.6 8.6 - 10.3 mg/dL   POCT GLUCOSE   Result Value Ref Range    POCT Glucose 148 (H) 74 - 99 mg/dL   CBC   Result Value Ref Range    WBC 9.6 4.4 - 11.3 x10*3/uL    nRBC 0.0 0.0 - 0.0 /100 WBCs    RBC 3.28 (L) 4.00 - 5.20 x10*6/uL    Hemoglobin 10.1 (L) 12.0 - 16.0 g/dL    Hematocrit 31.0 (L) 36.0 - 46.0 %    MCV 95 80 - 100 fL    MCH 30.8 26.0 - 34.0 pg    MCHC 32.6 32.0 - 36.0 g/dL    RDW 13.2 11.5 - 14.5 %    Platelets 211 150 - 450 x10*3/uL   POCT GLUCOSE   Result Value Ref Range    POCT Glucose 159 (H) 74 - 99 mg/dL   Transthoracic Echo (TTE) Complete   Result Value Ref Range    AV mn grad 7 mmHg    AV pk eagle 1.79 m/s    LV Biplane EF 47 %    LVOT diam 2.00 cm    MV E/A ratio 0.65     Tricuspid annular plane systolic excursion 2.5 cm    LA vol index A/L 24.5 ml/m2    LV EF 50 %     RV free wall pk S' 13.30 cm/s    LVIDd 4.68 cm    Aortic Valve Area by Continuity of Peak Velocity 1.98 cm2    AV pk grad 13 mmHg    Aortic Valve Area by Continuity of VTI 1.91 cm2    LV A4C EF 47.3    POCT GLUCOSE   Result Value Ref Range    POCT Glucose 118 (H) 74 - 99 mg/dL          Assessment/Plan       # Generalized Weakness   # Impaired Mobility  # chronic knee pain    Bilateral knee pains   Resent bilateral knee injections   Worsening pain bilaterally  Hx of DVT provoked 0- will get ultrasound to rule out DVT bilaterally  Doubt clot, but due to history we will review due to increase swelling  Orthopedics Consult due to recent injections   Analgesics as needed   Limit narcotics   PT/OT   Ultrasound bilateral lower extremities negative     # Hypertensive Emergency   # Suspected New Onset HF   Echo as above LVEF 50%  Cardiology Consulted     Anasarca to lower extremities   Pitting edema  No chest pain or orthopnea   Hydralazine PRN - good results  Trops are flat   Strict Intake and Output  Increase 1 dose of lasix 40mg     # CKD   Hx creatinine 1.5  Today 1.59 at baseline  Monitor   Limit nephrotoxic agents  Add Nephrology Consult due to uncontrolled hypertensive   Continue home medications   Bladder scan r/o obstruction     # Diabetes Mellitus Type 2  Continue home medications  Diet Cardiac/Diabetic  Continue Sliding Scale SSI AC/HS   A1C 8.3  Encourage healthy lifestyle changes      Full code  DVTp:   Diet: cardiac / diabetic   Disposition: Acute rehab?     Time spent  36 minutes obtaining labs, imaging, recommendations, interview, assessment, examination, medication review/ordering, and EMR review.    Plan of care was discussed extensively with patient. Patient verbalized understanding through teach back method. All questions and concerns addressed upon examination.     Of note, this documentation is completed using the Dragon Dictation system (voice recognition software). There may be spelling  and/or grammatical errors that were not corrected prior to final submission.        Sheree Darnell, APRN-CNP

## 2025-04-09 NOTE — CARE PLAN
The patient's goals for the shift include Get some sleep    The clinical goals for the shift include pt will report tolerable pain levels

## 2025-04-09 NOTE — PROGRESS NOTES
Occupational Therapy    Evaluation    Patient Name: Sarah Manzo  MRN: [id number]  Department: [facility] NON  Room: [address] [place] [address]  Today's Date: 4/9/2025  Time Calculation  Start Time: 0929  Stop Time: 0949  Time Calculation (min): 20 min      Assessment:  OT Assessment: Patient is limited by pain, balance deficits, deconditioning and decreased activity tolerance.  Prognosis: Good  Barriers to Discharge Home: Caregiver assistance, Physical needs  Evaluation/Treatment Tolerance: Patient limited by fatigue, Patient limited by pain  End of Session Communication: Bedside nurse  End of Session Patient Position: Bed, 3 rail up, Alarm on; Call light within reach.  OT Assessment Results: Decreased ADL status, Decreased endurance, Decreased functional mobility  Prognosis: Good  Evaluation/Treatment Tolerance: Patient limited by fatigue, Patient limited by pain  Plan:  Treatment Interventions: ADL retraining, Functional transfer training, UE strengthening/ROM, Endurance training  OT Frequency: 2 times per week  OT Discharge Recommendations: Moderate intensity level of continued care  OT Recommended Transfer Status: Assist of 2  OT - OK to Discharge: Yes (Once medically appropriate.)  Treatment Interventions: ADL retraining, Functional transfer training, UE strengthening/ROM, Endurance training    Subjective     General:  General  Reason for Referral: ADLs  Referred By: Forest NELSON  Past Medical History Relevant to Rehab: HLD, HTN, Neuropathy, OA, CKD, Anemia, DM, DVT  Co-Treatment: PT  Co-Treatment Reason: To maximize functional outcomes and patient safety.  Prior to Session Communication: Bedside nurse (Cleared for therapy evaluation by RN.)  Patient Position Received: Bed, 3 rail up, Alarm off, not on at start of session  General Comment: Patient presents with c/o difficulty ambulating. Per EMR, was unable to stand from her toilet. CXR: cardiomegaly. US B/L LEs: (-) DVT. Patient reports receiving injections to B/L knees. Dx:  Ambulatory dysfunction.  Precautions:  Medical Precautions: Fall precautions          Pain:  Pain Assessment  Pain Assessment: 0-10  0-10 (Numeric) Pain Score: 10 - Worst possible pain  Pain Location: Knee  Pain Orientation:  (B/L knees)  Pain Interventions: Repositioned  Response to Interventions: No change in pain    Objective   Cognition:  Overall Cognitive Status: Within Functional Limits (Oriented x4.)           Home Living:  Home Living Comments: Patient lives with friend and her son in a 1 story house with 2 BRAULIO without a HR. Tub shower with a grab bar. Laundry located in the basement with a HR.  Prior Function:  Prior Function Comments: Patient ambulates independently without a device, occasionally uses a walking stick and owns a cane. Independent with ADLs, increased time to don socks. Independent with IADLs. Denies falls in the past 3 months. Does not drive.     ADL:  Eating Assistance:  (Setup)  Grooming Assistance: Minimal  Bathing Assistance: Maximal  UE Dressing Assistance:  (Setup)  LE Dressing Assistance: Maximal  Toileting Assistance with Device: Total  Activity Tolerance:  Endurance: Decreased tolerance for upright activites  Bed Mobility/Transfers: Bed Mobility 1  Bed Mobility 1: Supine to sitting, Sitting to supine  Bed Mobility Comments 1: HOB elevated. Min A level with increased time/effort.    Transfer 1  Technique 1: Sit to stand, Stand to sit  Transfer Device 1:  (FWW)  Trials/Comments 1: Patient completed sit <> stand from EOB with a FWW and mod A x2, able to take a few side steps with a FWW at min A level. Patient c/o pain in B/L knees with standing.     Standing Balance:  Dynamic Standing Balance  Dynamic Standing-Comments: Poor +      Strength:  Strength Comments: B/L UE MMT grossly WFL during tasks.     Extremities: RUE   RUE :  (R UE AROM shoulder flex ~70 degrees, R UE AROM elbow flex/ext and digit flex/ext WFL.) and LUE   LUE:  (L UE AROM shoulder flex ~90 degrees, L UE AROM elbow  flex/ext and digit flex/ext WFL.)    Outcome Measures:[facility] Daily Activity  Putting on and taking off regular lower body clothing: Total  Bathing (including washing, rinsing, drying): A lot  Putting on and taking off regular upper body clothing: A little  Toileting, which includes using toilet, bedpan or urinal: Total  Taking care of personal grooming such as brushing teeth: A little  Eating Meals: A little  Daily Activity - Total Score: 13    Education Documentation  Body Mechanics, taught by Cheryl Leos OT at 4/9/2025  2:46 PM.  Learner: Patient  Readiness: Acceptance  Method: Explanation  Response: Needs Reinforcement  Comment: Walker management.    EDUCATION:  Education  Individual(s) Educated: Patient  Education Provided: POC discussed and agreed upon, Risk and benefits of OT discussed with patient or other, Fall precautons  Patient Response to Education: Patient/Caregiver Verbalized Understanding of Information    Goals:  Encounter Problems       Encounter Problems (Active)       OT Goals       Patient will complete functional mobility with a FWW at CGA level.  (Progressing)       Start:  04/09/25    Expected End:  04/23/25            Patient will complete functional transfers with a FWW at CGA level.  (Progressing)       Start:  04/09/25    Expected End:  04/23/25            Patient will complete lower body dressing at a CGA level.  (Progressing)       Start:  04/09/25    Expected End:  04/23/25            Patient will demonstrate fair dynamic standing balance during functional tasks. (Progressing)       Start:  04/09/25    Expected End:  04/23/25            Patient will complete toileting at a CGA level. (Progressing)       Start:  04/09/25    Expected End:  04/23/25

## 2025-04-09 NOTE — PROGRESS NOTES
04/09/25 1212   Discharge Planning   Living Arrangements Friends   Support Systems Children;Friends/neighbors   Assistance Needed independent with ambulation at times uses a cane, completed own adls, daughter assist with transportation does not drive. Shares iadls with her friend and her son   Type of Residence Private residence   Home or Post Acute Services None   Expected Discharge Disposition SNF   Financial Resource Strain   How hard is it for you to pay for the very basics like food, housing, medical care, and heating? Hard   Housing Stability   In the last 12 months, was there a time when you were not able to pay the mortgage or rent on time? N   In the past 12 months, how many times have you moved where you were living? 1   At any time in the past 12 months, were you homeless or living in a shelter (including now)? N   Transportation Needs   In the past 12 months, has lack of transportation kept you from medical appointments or from getting medications? no   In the past 12 months, has lack of transportation kept you from meetings, work, or from getting things needed for daily living? No   Patient Choice   Provider Choice list and CMS website (https://medicare.gov/care-compare#search) for post-acute Quality and Resource Measure Data were provided and reviewed with: Patient   Intensity of Service   Intensity of Service 0-30 min     Chart reviewed, writer spoke with patient- introduced self and explained role. Patient is alert and oriented x3. Prior to admission, patient lives with her 88 year old friend and her 65 year old son. 1 story home with basement. She is able to go in the basement to do laundry. She reports normally does not use a device. She used a cane last week at times when had her knees injected. She completes own adls. Her daughter assist with transportation to and from physician appts. She denies any issues with obtaining meals, groceries and medications. She reports did work with therapy  today but was only able to walk a few steps with help. Writer discussed possible SNF. She reports prefer to go home but she needs to be ambulatory before she goes back home. She was agreeable to accept list just in case may need SNF if level of functioning does not improve to be able to go home. IF able to go home would agree to [facility]. PCP is Dr. Jessica Fernandes who she saw a couple of weeks ago. Care Transition team to continue to monitor progression and address needs/ concerns as identified. KLEVER Allen

## 2025-04-09 NOTE — H&P
"History Of Present Illness  Sarah Manzo is a 79 y.o. female with a past medical history of HTN, T2DM, and b/l knee OA who presented to the ED with ambulatory dysfunction. The patient reports a 2 week history of knee pain that she has been receiving injections for, with her last injection yesterday. She reports the pain in her knees became worse overnight, and when she sat down on the commode today, she was unable to stand back up due to pain and weakness in her knees. She reports finally being able to get back to the couch and was \"stuck there all day\". She also reports her PCP noting leg swelling recently that she was due to see cardiology for. She denies any fever, chills, chest pain, shortness of breath, palpitation, or /GI symptoms.      Past Medical History  Past Medical History:   Diagnosis Date    Anesthesia of skin     Numbness and tingling    Personal history of diseases of the blood and blood-forming organs and certain disorders involving the immune mechanism     History of bleeding disorder    Personal history of malignant neoplasm, unspecified     History of malignant neoplasm    Personal history of other diseases of the circulatory system     History of hypertension    Personal history of other diseases of the musculoskeletal system and connective tissue     History of arthritis    Personal history of other endocrine, nutritional and metabolic disease     History of diabetes mellitus    Personal history of other specified conditions     History of balance disorder    Unspecified abnormalities of breathing     Breathing problem    Unspecified disorder of ear, unspecified ear     Ear, nose and throat disorder       Surgical History  Past Surgical History:   Procedure Laterality Date    OTHER SURGICAL HISTORY  07/20/2022    Cervical surgery    OTHER SURGICAL HISTORY  07/20/2022    Back surgery    OTHER SURGICAL HISTORY  07/20/2022    Knee arthroscopy        Social History  She reports that she has " "never smoked. She has never used smokeless tobacco. She reports current drug use. Drug: Other. She reports that she does not drink alcohol.    Family History  No family history on file.     Allergies  Hydrocodone-acetaminophen    Review of Systems   Constitutional:  Negative for chills and fever.   Respiratory:  Negative for shortness of breath.    Cardiovascular:  Positive for leg swelling. Negative for chest pain and palpitations.   Gastrointestinal:  Negative for abdominal pain, constipation, diarrhea, nausea and vomiting.   Genitourinary:  Negative for dysuria, flank pain, frequency, hematuria and urgency.   Musculoskeletal:  Positive for arthralgias and gait problem.   Neurological:  Positive for weakness.   All other systems reviewed and are negative.       Physical Exam  Vitals reviewed.   Constitutional:       Appearance: She is obese.   HENT:      Head: Normocephalic and atraumatic.   Cardiovascular:      Rate and Rhythm: Normal rate and regular rhythm.      Heart sounds: Normal heart sounds.   Pulmonary:      Effort: Pulmonary effort is normal.      Breath sounds: Normal air entry.   Abdominal:      General: Bowel sounds are normal.      Palpations: Abdomen is soft.      Tenderness: There is no abdominal tenderness.   Musculoskeletal:         General: No deformity.      Right lower leg: Edema present.      Left lower leg: Edema present.   Skin:     General: Skin is warm and dry.   Neurological:      General: No focal deficit present.      Mental Status: She is alert and oriented to person, place, and time.   Psychiatric:         Mood and Affect: Mood normal.         Behavior: Behavior normal.          Last Recorded Vitals  Blood pressure (!) 187/74, pulse 68, temperature 36.6 °C (97.9 °F), temperature source Temporal, resp. rate 18, height 1.727 m (5' 8\"), weight 97.5 kg (215 lb), SpO2 (!) 92%.    Relevant Results      Lab Results   Component Value Date    WBC 11.0 04/08/2025    HGB 10.5 (L) 04/08/2025    " HCT 32.3 (L) 04/08/2025    MCV 95 04/08/2025     04/08/2025     Lab Results   Component Value Date    GLUCOSE 219 (H) 04/08/2025    CALCIUM 8.7 04/08/2025     04/08/2025    K 4.5 04/08/2025    CO2 26 04/08/2025     (H) 04/08/2025    BUN 38 (H) 04/08/2025    CREATININE 1.62 (H) 04/08/2025     Vascular US Lower Extremity Venous Duplex Bilateral  Narrative: STUDY:  Bilateral Lower Extremity Venous Doppler Ultrasound; 4/8/2025 10:24 PM  INDICATION:  Bilateral leg edema  COMPARISON:  US BLE venous 3/13/2024  ACCESSION NUMBER(S):  [id number]  ORDERING CLINICIAN:  SETH MONTES  TECHNIQUE:    Real-time grayscale imaging, color Doppler flow imaging, and spectral  Doppler imaging of the bilateral lower extremity veins was performed.  FINDINGS:   The bilateral common femoral, profunda femoral, femoral and popliteal  veins demonstrated normal compressibility, normal phasic venous flow  and normal response to augmentation.  There is no evidence for  echogenic thrombi.  The visualized deep calf veins are patent.    Impression: No evidence for DVT within the bilateral lower extremities.  No  significant change from prior ultrasound.  Signed by Sarmad Delgado MD  XR chest 1 view  Narrative: Interpreted By:  Priyanka Lowe,   STUDY:  XR CHEST 1 VIEW;  4/8/2025 9:19 pm      INDICATION:  Signs/Symptoms:weakness, leg swelling.      COMPARISON:  Chest x-ray 10/18/2023      ACCESSION NUMBER(S):  [id number]      ORDERING CLINICIAN:  SETH MONTES      FINDINGS:          CARDIOMEDIASTINAL SILHOUETTE:  Cardiac silhouette is enlarged. Mild interstitial prominence. Mild  unfolding of the descending thoracic aorta.      LUNGS:  No consolidation, pleural effusion or pneumothorax.      ABDOMEN:  No remarkable upper abdominal findings.      BONES:  Postsurgical changes of cervical spine fusion. Multilevel  degenerative changes of the spine. Right greater than left shoulder  osteoarthrosis.      Impression: Cardiomegaly with  mild interstitial prominence which could be chronic  or relate to component developing interstitial edema. Correlate  clinically.      MACRO:  None      Signed by: Priyanka Lowe 4/8/2025 9:24 PM  Dictation workstation:   [id number]    ED Medication Administration from 04/08/2025 2026 to 04/09/2025 0046         Date/Time Order Dose Route Action Action by     04/08/2025 2303 EDT labetaloL (Normodyne,Trandate) injection 20 mg 20 mg intravenous Given [place], J               Assessment/Plan   Assessment & Plan  Ambulatory dysfunction      #Weakness  #Ambulatory dysfunction  -Falls precautions, PT OT    #Hypertensive emergency  #Suspected hypervolemia  #Suspected new onset HF  -  -Troponin mildly elevated, flat pattern, suspect demand mismatch  -No CP/SOB/NV, doubt ACS  -Labetalol IV given in ED, will use PRN  -Lasix 20mg IV given in ED, continue BID  -Echo in AM  -Consult cardiology   -Strict I&O  -Resume home medications    #CKD  -Patient unsure of stage, believes possibly IV  -Avoid nephrotoxins where possible, renal dosing  -Consult nephrology    #T2DM  -A1c 8.3 3/2025  -Low SSI  -Hypoglycemic protocol             Jef Garg, APRN-CNP

## 2025-04-09 NOTE — PROGRESS NOTES
Physical Therapy    Physical Therapy Evaluation    Patient Name: Sarah Manzo  MRN: [id number]  Today's Date: 4/9/2025   Time Calculation  Start Time: 0928  Stop Time: 0948  Time Calculation (min): 20 min  907/907-B    Assessment/Plan   PT Assessment  PT Assessment Results: Decreased strength, Decreased range of motion, Decreased endurance, Impaired balance, Decreased mobility, Decreased coordination, Pain  Rehab Prognosis: Fair  Barriers to Discharge Home: Caregiver assistance, Physical needs  Caregiver Assistance: Caregiver assistance needed per identified barriers - however, level of patient's required assistance exceeds assistance available at home  Physical Needs: 24hr ADL assistance needed, 24hr mobility assistance needed  Evaluation/Treatment Tolerance: Patient limited by fatigue, Patient limited by pain  Strengths: Coping skills, Attitude of self, Premorbid level of function  Barriers to Participation: Comorbidities  End of Session Communication: Bedside nurse  Assessment Comment: pt with decreased mobility gait strength balance endurance . pt to benefit from skilled PT to address deficits and improve functional mobility .  End of Session Patient Position: Bed, 3 rail up, Alarm on  IP OR SWING BED PT PLAN  Inpatient or Swing Bed: Inpatient  PT Plan  Treatment/Interventions: Bed mobility, Transfer training, Gait training, Stair training, Balance training, Strengthening, Endurance training, Range of motion, Therapeutic exercise, Therapeutic activity, Positioning  PT Plan: Ongoing PT  PT Frequency: 3 times per week  PT Discharge Recommendations: Moderate intensity level of continued care  Equipment Recommended upon Discharge: Wheeled walker  PT Recommended Transfer Status: Assist x2, Assistive device  PT - OK to Discharge: Yes (once medically ready for discharge to next level of care.)    Subjective     Current Problem:  1. Ambulatory dysfunction        2. Weakness        3. Hypervolemia, unspecified  hypervolemia type  Transthoracic Echo (TTE) Complete    Transthoracic Echo (TTE) Complete      4. DEEP (acute kidney injury)  Transthoracic Echo (TTE) Complete    Transthoracic Echo (TTE) Complete      5. Primary hypertension  Transthoracic Echo (TTE) Complete    Transthoracic Echo (TTE) Complete      6. Renovascular hypertension  Transthoracic Echo (TTE) Complete    Transthoracic Echo (TTE) Complete        General Visit Information:  General  Reason for Referral: weakness/impaired mobility  Referred By: OT/PT Forest 4/9  Past Medical History Relevant to Rehab: HLD,HTN,Nueropathy,OA,CKD,Anemia,DM,DVT  Co-Treatment: OT  Co-Treatment Reason: to maximize pt safety  Prior to Session Communication: Bedside nurse  Patient Position Received: Up in room, Alarm on  General Comment: pt is a 80 yo female came to the hospital on 4/ 8 with reports of diffculty ambulating . per report pt sat down on toilet at home and then was unable to get up and walk. pt spent several hours on toilet before being able to get help to get up .   test/ labs : trop 18, , CXR : cardiomegaly .  US BLEs  Neg DVT .  pt reports she recently got injections in bilat knees.  Home Living:  Home Living  Home Living Comments: pt lives with a friend and her friends son.   1 level home  2 steps no rail to enter.   pt has tub/shower with grab bar . laundry in basement steps down with rail.  Prior Level of Function:  Prior Function Per Pt/Caregiver Report  Prior Function Comments: per to IND with mobility adls and iadls.  has a cane and walking stick at home but doesnt use them ,  no falls .  doesnt drive her daughter takes her places.  Precautions:  Precautions  Medical Precautions: Fall precautions  Objective   Pain:  Pain Assessment  Pain Assessment: 0-10  0-10 (Numeric) Pain Score: 10 - Worst possible pain  Pain Type: Chronic pain  Pain Location: Knee  Pain Orientation: Left, Right  Pain Interventions: Repositioned  Response to Interventions: No change  in pain  Cognition:  Cognition  Overall Cognitive Status: Within Functional Limits  General Assessments:  Activity Tolerance  Endurance: Decreased tolerance for upright activites  Sensation  Sensation Comment: intact BLEs but pt reports neuropathy in feet.  Strength  Strength Comments: BLE 3+/5  Coordination  Movements are Fluid and Coordinated: Yes  Static Sitting Balance  Static Sitting-Comment/Number of Minutes: fair  Dynamic Sitting Balance  Dynamic Sitting-Comments: fair  Static Standing Balance  Static Standing-Comment/Number of Minutes: fair  Dynamic Standing Balance  Dynamic Standing-Comments: fair  Functional Assessments:  Bed Mobility  Bed Mobility: Yes  Bed Mobility 1  Bed Mobility 1: Supine to sitting, Sitting to supine  Level of Assistance 1: Minimum assistance, Minimal verbal cues  Bed Mobility Comments 1: min A in and out of bed with assistance for LEs.  increased time to complete task  Transfers  Transfer: Yes  Transfer 1  Technique 1: Sit to stand, Stand to sit  Transfer Device 1: Walker (FWW)  Transfer Level of Assistance 1: Moderate assistance, +2, Minimal verbal cues  Trials/Comments 1: mod A x 2 with FWW for pt to stand up from elvated bed.  cues to push up from bed . increased reports  Bilat knee pain .  Ambulation/Gait Training  Ambulation/Gait Training Performed: Yes  Ambulation/Gait Training 1  Surface 1: Level tile  Device 1: Rolling walker  Assistance 1: Moderate assistance  Quality of Gait 1: Inconsistent stride length, Decreased step length, Antalgic  Comments/Distance (ft) 1: 3 side steps with mod A x 2 with FWW . cues to move walker and feet.  limited by knee pain. diffculty with wt bearing . pt attempts to not bend knees with mobility  Extremity/Trunk Assessments:  RLE   RLE : Within Functional Limits  LLE   LLE : Within Functional Limits  Outcome Measures:   [facility] Basic Mobility  Turning from your back to your side while in a flat bed without using bedrails: A little  Moving from  lying on your back to sitting on the side of a flat bed without using bedrails: A little  Moving to and from bed to chair (including a wheelchair): A lot  Standing up from a chair using your arms (e.g. wheelchair or bedside chair): A lot  To walk in hospital room: A lot  Climbing 3-5 steps with railing: Total  Basic Mobility - Total Score: 13  Goals:  Encounter Problems       Encounter Problems (Active)       PT Problem       Pt will demonstrate SUP with bed mobility to edge of bed.   (Progressing)       Start:  04/09/25    Expected End:  04/23/25            Pt will demonstrate Sup with sit to stand/chair transfers with FWW.   (Progressing)       Start:  04/09/25    Expected End:  04/23/25            Pt will ambulate 50 feet with FWW Sup .   (Progressing)       Start:  04/09/25    Expected End:  04/23/25            Pt to demo improved BLE strength by being able to complete supine/seated thera ex 2x20 BLEs with 4 or less rest breaks .   (Progressing)       Start:  04/09/25    Expected End:  04/23/25               Pain - Adult            Education Documentation  Mobility Training, taught by Ashok Momni, PT at 4/9/2025 12:04 PM.  Learner: Patient  Readiness: Acceptance  Method: Explanation  Response: Verbalizes Understanding  Comment: transfers with FWW .    Education Comments  No comments found.

## 2025-04-09 NOTE — ED PROVIDER NOTES
HPI   Chief Complaint   Patient presents with    Knee Pain     Pt has been getting shots in bilateral knees for 2 weeks related to chronic issues. Pt unable to transfer today out of chair. Pain is constant in both knees. No redness/swelling. Pt a/ox4 from home. Denies cp/sob. No n/v. No fall./injury. no dizzy/LOC. Pt recently stopped taking water pills per cardiologist. No edema       79-year-old female patient's emergency department, patient presents that she is been unable to get out of her chair today.  She tells me she had injection in both of her knees by Ortho yesterday, was up and around yesterday, felt worse pain in her knees overnight, then this morning went to use the bathroom, states she sat down on the commode and was unable to get off due to being unable to bear weight in her knees, weakness.  States she got back to the couch and has been stuck on there all day.    She tells me this is the second set of injections she has had in her knees and this series, she has had the series multiple times previously.    She also tells me that her doctor noticed that her legs were swollen, referred her to cardiology who wanted to have some testing performed      History provided by:  Patient   used: No            Patient History   Past Medical History:   Diagnosis Date    Anesthesia of skin     Numbness and tingling    Personal history of diseases of the blood and blood-forming organs and certain disorders involving the immune mechanism     History of bleeding disorder    Personal history of malignant neoplasm, unspecified     History of malignant neoplasm    Personal history of other diseases of the circulatory system     History of hypertension    Personal history of other diseases of the musculoskeletal system and connective tissue     History of arthritis    Personal history of other endocrine, nutritional and metabolic disease     History of diabetes mellitus    Personal history of other  specified conditions     History of balance disorder    Unspecified abnormalities of breathing     Breathing problem    Unspecified disorder of ear, unspecified ear     Ear, nose and throat disorder     Past Surgical History:   Procedure Laterality Date    OTHER SURGICAL HISTORY  07/20/2022    Cervical surgery    OTHER SURGICAL HISTORY  07/20/2022    Back surgery    OTHER SURGICAL HISTORY  07/20/2022    Knee arthroscopy     No family history on file.  Social History     Tobacco Use    Smoking status: Never    Smokeless tobacco: Never   Vaping Use    Vaping status: Never Used   Substance Use Topics    Alcohol use: Never    Drug use: Yes     Types: Other     Comment: CBD gummies       Physical Exam   ED Triage Vitals [04/08/25 2032]   Temperature Heart Rate Respirations BP   36.6 °C (97.9 °F) 90 18 (!) 222/102      Pulse Ox Temp Source Heart Rate Source Patient Position   95 % Temporal Monitor --      BP Location FiO2 (%)     -- --       Physical Exam  Constitutional: Vitals noted, no distress. Afebrile.   Cardiovascular: Regular, rate, rhythm, no murmur.   Pulmonary: Lungs clear bilaterally with good aeration. No adventitious breath sounds.   Gastrointestinal: Soft, nonsurgical. Nontender. No peritoneal signs. Normoactive bowel sounds.   Musculoskeletal:  Bilateral knees with some swelling, no erythema, compartments soft to palpation, nontender.  Bilateral lower extremities with +2 pitting edema  Skin: No rash.   Neuro: No focal neurologic deficits, NIH score of 0.      ED Course & MDM   Diagnoses as of 04/09/25 0119   Weakness   Hypervolemia, unspecified hypervolemia type   DEEP (acute kidney injury)   Ambulatory dysfunction     Labs Reviewed   CBC WITH AUTO DIFFERENTIAL - Abnormal       Result Value    WBC 11.0      nRBC 0.0      RBC 3.40 (*)     Hemoglobin 10.5 (*)     Hematocrit 32.3 (*)     MCV 95      MCH 30.9      MCHC 32.5      RDW 13.3      Platelets 227      Neutrophils % 71.6      Immature Granulocytes %,  Automated 0.4      Lymphocytes % 15.8      Monocytes % 9.3      Eosinophils % 2.5      Basophils % 0.4      Neutrophils Absolute 7.90 (*)     Immature Granulocytes Absolute, Automated 0.04      Lymphocytes Absolute 1.74      Monocytes Absolute 1.02 (*)     Eosinophils Absolute 0.28      Basophils Absolute 0.04     COMPREHENSIVE METABOLIC PANEL - Abnormal    Glucose 219 (*)     Sodium 141      Potassium 4.5      Chloride 109 (*)     Bicarbonate 26      Anion Gap 11      Urea Nitrogen 38 (*)     Creatinine 1.62 (*)     eGFR 32 (*)     Calcium 8.7      Albumin 3.5      Alkaline Phosphatase 77      Total Protein 6.9      AST 11      Bilirubin, Total 0.3      ALT 11     C-REACTIVE PROTEIN - Abnormal    C-Reactive Protein 7.92 (*)    TROPONIN I, HIGH SENSITIVITY - Abnormal    Troponin I, High Sensitivity 17 (*)     Narrative:     Less than 99th percentile of normal range cutoff-  Female and children under 18 years old <14 ng/L; Male <21 ng/L: Negative  Repeat testing should be performed if clinically indicated.     Female and children under 18 years old 14-50 ng/L; Male 21-50 ng/L:  Consistent with possible cardiac damage and possible increased clinical   risk. Serial measurements may help to assess extent of myocardial damage.     >50 ng/L: Consistent with cardiac damage, increased clinical risk and  myocardial infarction. Serial measurements may help assess extent of   myocardial damage.      NOTE: Children less than 1 year old may have higher baseline troponin   levels and results should be interpreted in conjunction with the overall   clinical context.     NOTE: Troponin I testing is performed using a different   testing methodology at [facility] than at other   [facility]. Direct result comparisons should only   be made within the same method.   TROPONIN I, HIGH SENSITIVITY - Abnormal    Troponin I, High Sensitivity 18 (*)     Narrative:     Less than 99th percentile of normal range  cutoff-  Female and children under 18 years old <14 ng/L; Male <21 ng/L: Negative  Repeat testing should be performed if clinically indicated.     Female and children under 18 years old 14-50 ng/L; Male 21-50 ng/L:  Consistent with possible cardiac damage and possible increased clinical   risk. Serial measurements may help to assess extent of myocardial damage.     >50 ng/L: Consistent with cardiac damage, increased clinical risk and  myocardial infarction. Serial measurements may help assess extent of   myocardial damage.      NOTE: Children less than 1 year old may have higher baseline troponin   levels and results should be interpreted in conjunction with the overall   clinical context.     NOTE: Troponin I testing is performed using a different   testing methodology at [facility] than at other   [facility]. Direct result comparisons should only   be made within the same method.   B-TYPE NATRIURETIC PEPTIDE - Abnormal     (*)     Narrative:        <100 pg/mL - Heart failure unlikely  100-299 pg/mL - Intermediate probability of acute heart                  failure exacerbation. Correlate with clinical                  context and patient history.    >=300 pg/mL - Heart Failure likely. Correlate with clinical                  context and patient history.    BNP testing is performed using different testing methodology at [facility] than at other [facility]. Direct result comparisons should only be made within the same method.      MAGNESIUM - Normal    Magnesium 2.17     LACTATE - Normal    Lactate 1.1      Narrative:     Venipuncture immediately after or during the administration of Metamizole may lead to falsely low results. Testing should be performed immediately prior to Metamizole dosing.   PROTIME-INR - Normal    Protime 10.8      INR 1.0          Vascular US Lower Extremity Venous Duplex Bilateral   Final Result   No evidence for DVT within the bilateral lower  extremities.  No   significant change from prior ultrasound.   Signed by Sarmad Delgado MD      XR chest 1 view   Final Result   Cardiomegaly with mild interstitial prominence which could be chronic   or relate to component developing interstitial edema. Correlate   clinically.        MACRO:   None        Signed by: Priyanka Lowe 4/8/2025 9:24 PM   Dictation workstation:   [id number]                      No data recorded     Deidra Coma Scale Score: 15 (04/08/25 2034 : Mira Dunham RN)                   Medical Decision Making    Patient presents complaining of weakness, unable to bear weight in her legs, states her legs been swollen as well.  She did recently have injections in both knees, although these are mildly swollen, not erythematous or warm, compartments soft to palpation.    EKG obtained at 2119 with ventricular rate of 79, interpreted me, shows normal sinus rhythm with normal axis normal intervals, unremarkable ST and T wave patterns, no evidence of acute ischemia or other acute findings.    Workup initiated, CBC with a white count of 11.0, hemoglobin 10.5, platelet count of 227.  Lactate level unremarkable at 1.1, CRP 7.92.  INR 1.0.  Metabolic panel with glucose of 219, sodium 141, potassium 4.5, although mildly hemolyzed, BUN slightly elevated at 38 with a creatinine of 1.62.  Magnesium 2.17.  Initial troponin 17, delta troponin 18.  BNP mildly elevated to 302.    Chest x-ray does show some interstitial prominence.    Ultrasound imaging of bilateral lower extremities without evidence of DVT.    Patient's blood pressure remained elevated, I did give her 20 mg of IV labetalol.    Ultimately her workup is concerning for fluid overload, will initiate on furosemide and give aspirin as well.    Given her fluid overload, inability to ambulate I did recommend hospitalization, spoke with Eulalio of the hospitalist service, accepted under Dr. Zacarias.    Procedure  Procedures     Randee Rosenberg,  GALINA-CNP  04/09/25 0122

## 2025-04-09 NOTE — CARE PLAN
The patient's goals for the shift include Get some sleep.    The clinical goals for the shift include PT safety and comfort.    Over the shift, the patient did not make progress toward the following goals. Barriers to progression include recurrent pain. Recommendations to address these barriers include pain medications as needed.

## 2025-04-10 ENCOUNTER — APPOINTMENT (OUTPATIENT)
Dept: RADIOLOGY | Facility: HOSPITAL | Age: 80
DRG: 291 | End: 2025-04-10
Payer: COMMERCIAL

## 2025-04-10 LAB
ANION GAP SERPL CALC-SCNC: 12 MMOL/L (ref 10–20)
APPEARANCE UR: ABNORMAL
BILIRUB UR STRIP.AUTO-MCNC: NEGATIVE MG/DL
BUN SERPL-MCNC: 40 MG/DL (ref 6–23)
CALCIUM SERPL-MCNC: 8.7 MG/DL (ref 8.6–10.3)
CHLORIDE SERPL-SCNC: 104 MMOL/L (ref 98–107)
CO2 SERPL-SCNC: 28 MMOL/L (ref 21–32)
COLOR UR: ABNORMAL
CREAT SERPL-MCNC: 1.92 MG/DL (ref 0.5–1.05)
CREAT UR-MCNC: 18.5 MG/DL (ref 20–320)
EGFRCR SERPLBLD CKD-EPI 2021: 26 ML/MIN/1.73M*2
ERYTHROCYTE [DISTWIDTH] IN BLOOD BY AUTOMATED COUNT: 13.3 % (ref 11.5–14.5)
GLUCOSE BLD MANUAL STRIP-MCNC: 141 MG/DL (ref 74–99)
GLUCOSE BLD MANUAL STRIP-MCNC: 186 MG/DL (ref 74–99)
GLUCOSE BLD MANUAL STRIP-MCNC: 207 MG/DL (ref 74–99)
GLUCOSE BLD MANUAL STRIP-MCNC: 211 MG/DL (ref 74–99)
GLUCOSE SERPL-MCNC: 156 MG/DL (ref 74–99)
GLUCOSE UR STRIP.AUTO-MCNC: ABNORMAL MG/DL
HCT VFR BLD AUTO: 32.4 % (ref 36–46)
HGB BLD-MCNC: 10.9 G/DL (ref 12–16)
HOLD SPECIMEN: NORMAL
KETONES UR STRIP.AUTO-MCNC: NEGATIVE MG/DL
LEUKOCYTE ESTERASE UR QL STRIP.AUTO: ABNORMAL
MCH RBC QN AUTO: 31 PG (ref 26–34)
MCHC RBC AUTO-ENTMCNC: 33.6 G/DL (ref 32–36)
MCV RBC AUTO: 92 FL (ref 80–100)
MICROALBUMIN UR-MCNC: 120.1 MG/L
MICROALBUMIN/CREAT UR: 649.2 UG/MG CREAT
MUCOUS THREADS #/AREA URNS AUTO: ABNORMAL /LPF
NITRITE UR QL STRIP.AUTO: NEGATIVE
NRBC BLD-RTO: 0 /100 WBCS (ref 0–0)
PH UR STRIP.AUTO: 7 [PH]
PLATELET # BLD AUTO: 221 X10*3/UL (ref 150–450)
POTASSIUM SERPL-SCNC: 4.4 MMOL/L (ref 3.5–5.3)
PROT UR STRIP.AUTO-MCNC: ABNORMAL MG/DL
RBC # BLD AUTO: 3.52 X10*6/UL (ref 4–5.2)
RBC # UR STRIP.AUTO: ABNORMAL MG/DL
RBC #/AREA URNS AUTO: ABNORMAL /HPF
SODIUM SERPL-SCNC: 140 MMOL/L (ref 136–145)
SP GR UR STRIP.AUTO: 1.01
TSH SERPL-ACNC: 2.73 MIU/L (ref 0.44–3.98)
UROBILINOGEN UR STRIP.AUTO-MCNC: NORMAL MG/DL
WBC # BLD AUTO: 8.2 X10*3/UL (ref 4.4–11.3)
WBC #/AREA URNS AUTO: ABNORMAL /HPF

## 2025-04-10 PROCEDURE — 2500000002 HC RX 250 W HCPCS SELF ADMINISTERED DRUGS (ALT 637 FOR MEDICARE OP, ALT 636 FOR OP/ED): Performed by: STUDENT IN AN ORGANIZED HEALTH CARE EDUCATION/TRAINING PROGRAM

## 2025-04-10 PROCEDURE — 80048 BASIC METABOLIC PNL TOTAL CA: CPT | Performed by: REGISTERED NURSE

## 2025-04-10 PROCEDURE — 36415 COLL VENOUS BLD VENIPUNCTURE: CPT | Performed by: INTERNAL MEDICINE

## 2025-04-10 PROCEDURE — 82043 UR ALBUMIN QUANTITATIVE: CPT | Performed by: HOSPITALIST

## 2025-04-10 PROCEDURE — 76770 US EXAM ABDO BACK WALL COMP: CPT

## 2025-04-10 PROCEDURE — 97116 GAIT TRAINING THERAPY: CPT | Mod: GP,CQ

## 2025-04-10 PROCEDURE — 99222 1ST HOSP IP/OBS MODERATE 55: CPT | Performed by: ORTHOPAEDIC SURGERY

## 2025-04-10 PROCEDURE — 97110 THERAPEUTIC EXERCISES: CPT | Mod: GP,CQ

## 2025-04-10 PROCEDURE — 2500000001 HC RX 250 WO HCPCS SELF ADMINISTERED DRUGS (ALT 637 FOR MEDICARE OP): Performed by: NURSE PRACTITIONER

## 2025-04-10 PROCEDURE — 2500000001 HC RX 250 WO HCPCS SELF ADMINISTERED DRUGS (ALT 637 FOR MEDICARE OP): Performed by: STUDENT IN AN ORGANIZED HEALTH CARE EDUCATION/TRAINING PROGRAM

## 2025-04-10 PROCEDURE — 85027 COMPLETE CBC AUTOMATED: CPT | Performed by: REGISTERED NURSE

## 2025-04-10 PROCEDURE — 2500000004 HC RX 250 GENERAL PHARMACY W/ HCPCS (ALT 636 FOR OP/ED): Performed by: NURSE PRACTITIONER

## 2025-04-10 PROCEDURE — 87086 URINE CULTURE/COLONY COUNT: CPT | Mod: ELYLAB | Performed by: REGISTERED NURSE

## 2025-04-10 PROCEDURE — 82947 ASSAY GLUCOSE BLOOD QUANT: CPT

## 2025-04-10 PROCEDURE — 76770 US EXAM ABDO BACK WALL COMP: CPT | Performed by: RADIOLOGY

## 2025-04-10 PROCEDURE — 99232 SBSQ HOSP IP/OBS MODERATE 35: CPT | Performed by: REGISTERED NURSE

## 2025-04-10 PROCEDURE — 97530 THERAPEUTIC ACTIVITIES: CPT | Mod: GP,CQ

## 2025-04-10 PROCEDURE — 1200000002 HC GENERAL ROOM WITH TELEMETRY DAILY

## 2025-04-10 PROCEDURE — 2500000004 HC RX 250 GENERAL PHARMACY W/ HCPCS (ALT 636 FOR OP/ED): Performed by: REGISTERED NURSE

## 2025-04-10 PROCEDURE — 81001 URINALYSIS AUTO W/SCOPE: CPT | Performed by: REGISTERED NURSE

## 2025-04-10 PROCEDURE — 84443 ASSAY THYROID STIM HORMONE: CPT | Performed by: INTERNAL MEDICINE

## 2025-04-10 PROCEDURE — 2500000001 HC RX 250 WO HCPCS SELF ADMINISTERED DRUGS (ALT 637 FOR MEDICARE OP): Performed by: HOSPITALIST

## 2025-04-10 PROCEDURE — 36415 COLL VENOUS BLD VENIPUNCTURE: CPT | Performed by: REGISTERED NURSE

## 2025-04-10 PROCEDURE — 83970 ASSAY OF PARATHORMONE: CPT | Mod: ELYLAB | Performed by: INTERNAL MEDICINE

## 2025-04-10 PROCEDURE — 99223 1ST HOSP IP/OBS HIGH 75: CPT | Performed by: INTERNAL MEDICINE

## 2025-04-10 RX ORDER — REGADENOSON 0.08 MG/ML
0.4 INJECTION, SOLUTION INTRAVENOUS
Status: CANCELLED | OUTPATIENT
Start: 2025-04-10

## 2025-04-10 RX ORDER — METOPROLOL TARTRATE 25 MG/1
25 TABLET, FILM COATED ORAL 2 TIMES DAILY
Status: DISCONTINUED | OUTPATIENT
Start: 2025-04-10 | End: 2025-04-10

## 2025-04-10 RX ORDER — CARVEDILOL 6.25 MG/1
6.25 TABLET ORAL 2 TIMES DAILY
Status: DISCONTINUED | OUTPATIENT
Start: 2025-04-10 | End: 2025-04-12

## 2025-04-10 RX ORDER — CEFTRIAXONE 1 G/50ML
1 INJECTION, SOLUTION INTRAVENOUS EVERY 24 HOURS
Status: DISCONTINUED | OUTPATIENT
Start: 2025-04-10 | End: 2025-04-12 | Stop reason: HOSPADM

## 2025-04-10 RX ORDER — VALSARTAN 160 MG/1
320 TABLET ORAL DAILY
Status: DISCONTINUED | OUTPATIENT
Start: 2025-04-11 | End: 2025-04-12 | Stop reason: HOSPADM

## 2025-04-10 RX ORDER — AMINOPHYLLINE 25 MG/ML
125 INJECTION, SOLUTION INTRAVENOUS ONCE AS NEEDED
Status: CANCELLED | OUTPATIENT
Start: 2025-04-10

## 2025-04-10 RX ORDER — CARVEDILOL 6.25 MG/1
6.25 TABLET ORAL
Qty: 60 TABLET | Refills: 3 | Status: SHIPPED | OUTPATIENT
Start: 2025-04-10 | End: 2025-04-12 | Stop reason: HOSPADM

## 2025-04-10 RX ADMIN — SIMVASTATIN 80 MG: 40 TABLET, FILM COATED ORAL at 20:13

## 2025-04-10 RX ADMIN — ASPIRIN 81 MG: 81 TABLET, COATED ORAL at 20:13

## 2025-04-10 RX ADMIN — ACETAMINOPHEN 650 MG: 325 TABLET ORAL at 00:29

## 2025-04-10 RX ADMIN — FUROSEMIDE 20 MG: 10 INJECTION, SOLUTION INTRAMUSCULAR; INTRAVENOUS at 21:00

## 2025-04-10 RX ADMIN — ACETAMINOPHEN 650 MG: 325 TABLET ORAL at 09:21

## 2025-04-10 RX ADMIN — INSULIN LISPRO 1 UNITS: 100 INJECTION, SOLUTION INTRAVENOUS; SUBCUTANEOUS at 11:20

## 2025-04-10 RX ADMIN — CEFTRIAXONE SODIUM 1 G: 1 INJECTION, SOLUTION INTRAVENOUS at 15:22

## 2025-04-10 RX ADMIN — HYDRALAZINE HYDROCHLORIDE 5 MG: 20 INJECTION INTRAMUSCULAR; INTRAVENOUS at 06:02

## 2025-04-10 RX ADMIN — INSULIN LISPRO 2 UNITS: 100 INJECTION, SOLUTION INTRAVENOUS; SUBCUTANEOUS at 17:27

## 2025-04-10 RX ADMIN — AMLODIPINE BESYLATE 5 MG: 5 TABLET ORAL at 09:18

## 2025-04-10 RX ADMIN — EMPAGLIFLOZIN 10 MG: 10 TABLET, FILM COATED ORAL at 09:19

## 2025-04-10 RX ADMIN — HEPARIN SODIUM 5000 UNITS: 5000 INJECTION INTRAVENOUS; SUBCUTANEOUS at 06:50

## 2025-04-10 RX ADMIN — HEPARIN SODIUM 5000 UNITS: 5000 INJECTION INTRAVENOUS; SUBCUTANEOUS at 14:28

## 2025-04-10 RX ADMIN — HEPARIN SODIUM 5000 UNITS: 5000 INJECTION INTRAVENOUS; SUBCUTANEOUS at 23:00

## 2025-04-10 RX ADMIN — FUROSEMIDE 20 MG: 10 INJECTION, SOLUTION INTRAMUSCULAR; INTRAVENOUS at 09:18

## 2025-04-10 RX ADMIN — CARVEDILOL 6.25 MG: 6.25 TABLET, FILM COATED ORAL at 20:13

## 2025-04-10 ASSESSMENT — PAIN SCALES - GENERAL
PAINLEVEL_OUTOF10: 0 - NO PAIN
PAINLEVEL_OUTOF10: 0 - NO PAIN
PAINLEVEL_OUTOF10: 7

## 2025-04-10 ASSESSMENT — ENCOUNTER SYMPTOMS
ABDOMINAL DISTENTION: 0
FEVER: 0
ARTHRALGIAS: 1
EYES NEGATIVE: 1
TROUBLE SWALLOWING: 0
NAUSEA: 0
JOINT SWELLING: 0
WEAKNESS: 1
SHORTNESS OF BREATH: 0
DIARRHEA: 0
ENDOCRINE NEGATIVE: 1
CHEST TIGHTNESS: 0
PSYCHIATRIC NEGATIVE: 1
ABDOMINAL PAIN: 0
VOMITING: 0
DYSURIA: 0
ACTIVITY CHANGE: 0
DIFFICULTY URINATING: 0
VOICE CHANGE: 0
COUGH: 0

## 2025-04-10 ASSESSMENT — PAIN - FUNCTIONAL ASSESSMENT
PAIN_FUNCTIONAL_ASSESSMENT: 0-10
PAIN_FUNCTIONAL_ASSESSMENT: 0-10

## 2025-04-10 ASSESSMENT — COGNITIVE AND FUNCTIONAL STATUS - GENERAL
MOVING FROM LYING ON BACK TO SITTING ON SIDE OF FLAT BED WITH BEDRAILS: A LITTLE
STANDING UP FROM CHAIR USING ARMS: A LOT
MOVING TO AND FROM BED TO CHAIR: A LITTLE
MOBILITY SCORE: 15
CLIMB 3 TO 5 STEPS WITH RAILING: TOTAL
TURNING FROM BACK TO SIDE WHILE IN FLAT BAD: A LITTLE
WALKING IN HOSPITAL ROOM: A LITTLE

## 2025-04-10 NOTE — CONSULTS
"Reason For Consult  Heart Failure education    History Of Present Illness  Sarah Manzo is a 79 y.o. female presenting with Heart Failure education.     Past Medical History  She has a past medical history of Anesthesia of skin, Personal history of diseases of the blood and blood-forming organs and certain disorders involving the immune mechanism, Personal history of malignant neoplasm, unspecified, Personal history of other diseases of the circulatory system, Personal history of other diseases of the musculoskeletal system and connective tissue, Personal history of other endocrine, nutritional and metabolic disease, Personal history of other specified conditions, Unspecified abnormalities of breathing, and Unspecified disorder of ear, unspecified ear.    Surgical History  She has a past surgical history that includes Other surgical history (07/20/2022); Other surgical history (07/20/2022); and Other surgical history (07/20/2022).     Social History  She reports that she has never smoked. She has never used smokeless tobacco. She reports current drug use. Drug: Other. She reports that she does not drink alcohol.    Family History  No family history on file.     Allergies  Hydrocodone-acetaminophen    Review of Systems  deferred     Physical Exam  deferred     Last Recorded Vitals  Blood pressure 121/65, pulse 69, temperature 36 °C (96.8 °F), resp. rate 18, height 1.727 m (5' 8\"), weight 105 kg (231 lb 7.7 oz), SpO2 97%.    Relevant Results  PHYSICIAN INTERPRETATION:  Left Ventricle: The left ventricular systolic function is mildly decreased, with a visually estimated ejection fraction of 50%. There is moderate concentric left ventricular hypertrophy. There are no regional wall motion abnormalities. The left ventricular cavity size is normal. There is moderately increased septal and moderately increased posterior left ventricular wall thickness. Spectral Doppler shows a Grade I (impaired relaxation pattern) of left " ventricular diastolic filling with normal left atrial filling pressure.  LV Wall Scoring:  All segments are normal.     Left Atrium: The left atrial size is normal.  Right Ventricle: The right ventricle is normal in size. There is normal right ventricular global systolic function.  Right Atrium: The right atrial size is normal.  Aortic Valve: The aortic valve appears structurally normal. There is mild aortic valve cusp calcification. There is no evidence of aortic valve stenosis.  The aortic valve dimensionless index is 0.61. There is no evidence of aortic valve regurgitation. The peak instantaneous gradient of the aortic valve is 13 mmHg. The mean gradient of the aortic valve is 7 mmHg.  Mitral Valve: The mitral valve is normal in structure. There is no evidence of mitral valve stenosis. There is normal mitral valve leaflet mobility. There is mild mitral annular calcification. There is trace mitral valve regurgitation.  Tricuspid Valve: The tricuspid valve is structurally normal. There is normal tricuspid valve leaflet mobility. There is trace to mild tricuspid regurgitation.  Pulmonic Valve: The pulmonic valve is not well visualized. There is no indication of pulmonic valve regurgitation.  Pericardium: No pericardial effusion noted.  Aorta: The aortic root is normal.  In comparison to the previous echocardiogram(s): There are no prior studies on this patient for comparison purposes.        CONCLUSIONS:   1. The left ventricular systolic function is mildly decreased, with a visually estimated ejection fraction of 50%.   2. No regional wall motion abnormalities.   3. Spectral Doppler shows a Grade I (impaired relaxation pattern) of left ventricular diastolic filling with normal left atrial filling pressure.   4. There is normal right ventricular global systolic function.   5. Normal sized right ventricle.   6. There is no evidence of mitral valve stenosis.   7. Trace mitral valve regurgitation.   8. Trace to mild  tricuspid regurgitation.   9. Aortic valve stenosis is not present.  10. There is moderately increased septal and moderately increased posterior left ventricular wall thickness.     Assessment/Plan     Heart Failure Nurse Navigator    The role of the HF nurse navigator is to (1) characterize risk profiles of patients with heart failure transitioning from [id number] after hospitalization, (2) recommend interventions to improve care and reduce risks of worse post-hospitalization outcomes. Potential recommendations may touch base on patient/family education, additional consults that may bring value, and appointment scheduling.    History    I met with Sarah Manzo at the bedside, and my impressions include: Patient is A&Ox 4 sitting in chair.  Introduced my self and reason for visit.  Patient agreeable to discussion and education.  Patient has family history of HF, brother and son.  Describe recent visit to primary doctor and cardiologist, both concerned with swelling in legs and kidney function.  Patient was instructed to have echo and ultrasound of lower extremities as OP but was able to have both while in the hospital.  Patient admits to seeing a cardiologist but stopped about 15 years ago as there was nothing going on, per her.  When asked about taking Jardiance, patient is no familiar with this medication and states she only takes Glipizide for diabetes    Patients Cardiologist(s): Dr. Barrett    Patients Primary Care Provider: Dr. MOODY Fernandes    1. Medical Domain  What is the patient's most recent LVEF? 50%  HFrEF (LVEF <= 40%) Quadruple therapy recommended  HFmrEF (LVEF 41-49%) Quadruple therapy recommended  HFpEF (LVEF >= 50%) Minimum recommendations: SGLT2i and MRA  Is the patient on OP GDMT for their condition?   ARNI/ACEI/ARB: No None  BB: No None  MRA: No None  SGLT2i: No None  Is the patient prescribed a diuretic? Yes  Furosemide 20 mg daily  Does this patient have an implanted device (ie  "cardioMEMS, ICD, CRT-D)?  Device type: No  Could this patient have advanced heart failure (Stage D heart failure)?: No   If yes, the potential markers of advanced heart failure include: NA    REFERENCE: Potential markers of advanced HF   Inotrope (dobutamine or milrinone) used during this admission?   LVEF<=25%?   >=2 hospitalizations for ADHF in the last year?   Severe symptoms of HF (fatigue, dyspnea, confusion, edema) despite medical therapy?   Downtitration of GDMT as compared to home medications?   Discontinuation of GDMT because of hypotension or renal intolerance?   Recurrent arrhythmias (AF, VT with ICD shocks)?   Cardiac cachexia (i.e., unintentional weight loss due to HF)?   High-risk biomarker profile (e.g., hyponatremia [Na<135], very elevated BNP, worsening kidney function)   Escalating doses of diuretics or persistent edema despite escalation     2. Mind and Emotion  Does this patient have possible cognitive impairment?: No (The Mini-Cog score 5/5)  Ask the patient to memorize these 3 words: banana, sunrise, chair  Ask the patient to draw a clock with hands pointing at \"20 minutes after 8\"  Ask the patient to recall the 3 words  Score: Add number of words recalled + clock drawing (0 points for any errors, 2 points if correct)  Interpretation: A score of 0-2 suggests cognitive impairment is present, a score of 3-5 suggests cognitive impairment is absent  Does this patient have major depression?: No (PH-Q2 score 0/6)  Over the last 2 weeks: Little interest or pleasure in doing things? (Not at all +0, Several days +1, More than half the days +2, Nearly every day +3)  Over the last 2 weeks: Feeling down, depressed or hopeless? (Not at all +0, Several days +1, More than half the days +2, Nearly every day +3)  Score: Add points  Interpretation: A score of 3 or more suggests that major depression is likely.     3. Physical Function  Could this patient be frail?: No   Defined by presence of all of these: " slowness, weakness, shrinking, inactivity, exhaustion  Is this patient at risk for falling?: No   Defined by having experienced a fall in the last 12 months.    4. Social Determinants of Health  Transportation deficits?: No   Lack of insurance?: No   Poor financial resources?: No   Living conditions (homelessness, unstable home)?: No   Poor family/social support?: No   Poor personal care?: No   Food insecurity?: No   Lack of HCPOA?: No    I provided the following heart failure education:  Met with patient for education. Discussed CHF, signs and symptoms, and when to call cardiologist. Reinforced the importance of following a Low Sodium Diet and monitoring daily weight, lower leg edema, and shortness of breath. Reviewed importance of taking prescribed medications after discharge. Reinforced importance of following up with cardiologist within a week of discharge. Reminded patient that they have my contact information should any questions or concerns arise.   Assessment  1.  2.  3.  4.    IP Medications:  ARNi/ACEi/ARB: Valsartan 320 mg daily  BB: Carvedilol 6.25 mg BID  MRA: None  SGLT2i: None  Diuretic: Furosemide 20 mg IV BID    Recommendations/ Plan  Daily re-enforcement of education  Close follow up with cardiology and nephrology      *Cardiology Discharge Appointment Follow-up Plan:               Calli Martinez RN

## 2025-04-10 NOTE — PROGRESS NOTES
Physical Therapy    Physical Therapy Treatment    Patient Name: Sarah Manzo  MRN: [id number]  Today's Date: 4/10/2025  Time Calculation  Start Time: 1049  Stop Time: 1330  Time Calculation (min): 161 min  10:49 to 11:10(21 mins) returning at 13:10 to 13:30 (20min)       907/907-B    Assessment/Plan   PT Assessment  PT Assessment Results: Decreased strength, Decreased range of motion, Decreased endurance, Impaired balance, Decreased mobility, Decreased coordination, Pain  Rehab Prognosis: Good  Barriers to Discharge Home: Caregiver assistance, Physical needs  Caregiver Assistance: Caregiver assistance needed per identified barriers - however, level of patient's required assistance exceeds assistance available at home  Physical Needs: 24hr ADL assistance needed, 24hr mobility assistance needed  Evaluation/Treatment Tolerance: Patient tolerated treatment well  Medical Staff Made Aware: Yes  Strengths: Coping skills, Attitude of self  Barriers to Participation: Comorbidities  End of Session Communication: Bedside nurse  Assessment Comment: pt would benefit from continued therapy to improve functional mobility , strength and safety  End of Session Patient Position: Alarm on, Up in chair     Treatment/Interventions: Transfer training, Gait training, Therapeutic exercise, Therapeutic activity, Home exercise program  PT Plan: Ongoing PT  PT Frequency: 3 times per week  PT Discharge Recommendations: Moderate intensity level of continued care, Low intensity level of continued care  Equipment Recommended upon Discharge: Wheeled walker   PT Recommended Transfer Status: Assist x1, Assistive device    General Visit Information:   PT  Visit  PT Received On: 04/10/25  General  Reason for Referral: weakness/impaired mobility  Referred By: OT/PT [place] 4/9  Past Medical History Relevant to Rehab: HLD,HTN,Nueropathy,OA,CKD,Anemia,DM,DVT  Family/Caregiver Present: No  Caregiver Feedback: present and supportive  Prior to Session  Communication: Bedside nurse (cleared to participate)  Patient Position Received: Alarm on, Bed, 3 rail up  General Comment: agreeable to particpate and sit up in chair , pt states she hopes to go home upon dscharge, reports her legs feel better now that they are not as swollen    General Observations:   General Observation: purewick removed prior to gait    Subjective     Precautions:  Precautions  Medical Precautions: Fall precautions    Vital Signs:     Objective     Pain:  Pain Assessment  Pain Assessment: 0-10  0-10 (Numeric) Pain Score:  (reports pain in feet knees and shoulders with the feet  being the worst, No numerical value given)  Pain Type: Chronic pain  Pain Interventions: Ambulation/increased activity  Response to Interventions: No change in pain    Cognition:  Cognition  Overall Cognitive Status: Within Functional Limits  Orientation Level: Oriented X4    Activity Tolerance:  Activity Tolerance  Endurance: Decreased tolerance for upright activites    Treatments:  Therapeutic Exercise  Therapeutic Exercise Performed: Yes  Therapeutic Exercise Activity 1: instructed in and perfomed ther ex including AP, QS, GS, HSLAQ, Hip flexion, abd/add 10 reps each Pt reports doing exercises at home in the past     Bed Mobility  Bed Mobility: Yes  Bed Mobility 1  Bed Mobility Comments 1: transfers supine --> sit with CGA and head of bed elevated  Ambulation/Gait Training  Ambulation/Gait Training Performed: Yes  Ambulation/Gait Training 1  Surface 1: Level tile  Device 1: Rolling walker  Comments/Distance (ft) 1: ambulating 3 ft with WW and Min A using gait belt . also ambulating 20ft x 2 with WW and Min A improving to CGA  Pt currently using walking stick vs cane .pt agrees that she is more steady and feels more secure with WW , would benefit from WW for home  Transfers  Transfer: Yes  Transfer 1  Technique 1: Sit to stand, Stand to sit  Trials/Comments 1: transfers sit <--> stand with Mod  A and vc for technique,   hand placement and safety. performed from bed level , BSC and commode with raised seat with use of grab bars Currently requires Mod A for sit to stand and Mod A imoroving to Min A  with stand to sit  Stairs  Stairs: No          Outcome Measures:     [facility] Basic Mobility  Turning from your back to your side while in a flat bed without using bedrails: A little  Moving from lying on your back to sitting on the side of a flat bed without using bedrails: A little  Moving to and from bed to chair (including a wheelchair): A little  Standing up from a chair using your arms (e.g. wheelchair or bedside chair): A lot  To walk in hospital room: A little  Climbing 3-5 steps with railing: Total  Basic Mobility - Total Score: 15         EDUCATION:    Individual(s) Educated: Patient  Education Provided: Fall Risk, Home Exercise Program, Home Safety  Patient Response to Education: Patient/Caregiver Verbalized Understanding of Information    Encounter Problems       Encounter Problems (Active)       PT Problem       Pt will demonstrate SUP with bed mobility to edge of bed.   (Progressing)       Start:  04/09/25    Expected End:  04/23/25            Pt will demonstrate Sup with sit to stand/chair transfers with FWW.   (Progressing)       Start:  04/09/25    Expected End:  04/23/25            Pt will ambulate 50 feet with FWW Sup .   (Progressing)       Start:  04/09/25    Expected End:  04/23/25            Pt to demo improved BLE strength by being able to complete supine/seated thera ex 2x20 BLEs with 4 or less rest breaks .   (Progressing)       Start:  04/09/25    Expected End:  04/23/25               Pain - Adult

## 2025-04-10 NOTE — DISCHARGE INSTRUCTIONS
Thank you for choosing [facility]. It has been a pleasure taking part in your medical care. Please follow up with your primary care provider as instructed. If your symptoms should persist or worsen, please contact your primary care physician, or in the case of an emergency proceed to the nearest Emergency Room for further care. If you have any questions about the care you received, please call [facility] at [phone]. Thank you again! OMAR Bentley  *Please note the information above is to be used as a guide, follow up with your PCP for specific information related to your needs *  Please note you were treated for 3 days of antibiotics for UTI.  Please note that you will need to follow-up with your PCP to get final sensitivity.  You may need additional antibiotics.    Please make sure you schedule with Cardiology for Malathi Scan outpatient  We have stopped our Jardiance - you will need to resume this possibly with your Cardiologist outpatient       HEART FAILURE EDUCATION:  1. Weigh yourself daily and record on your weight log.  2. If you gain more than 2 or 3 pounds overnight, call your cardiologist.  3. Follow a low sodium diet. No more than 2000 mg in one day, or more than 650 mg per meal.  4. Limit total fluids to no more than 8 cups (or 2 liters) per day - this includes all fluids (water, coffee, juice, milk, tea, etc.)  5. Monitor your blood pressure daily and record on your weight log.  6. Call to schedule your follow-up appointments when you get home if they were not already scheduled for you.  7. Keep your follow-up appointments! Bring your weight log with you so the doctors can see your weight trend and blood pressure readings.  8. Be sure to  any new prescriptions and take them as directed. If unsure of the medications, be sure to call your cardiologist.  9. Stay as active as you can tolerate.   10. If you notice subtle change of symptoms (slight increase in  swelling, slight shortness of breath, a new intolerance to laying flat, a new cough), be sure to call your cardiologist.  11. If you have any questions or concerns or you have not heard back from the cardiologist, feel free to call Calli Martinez, heart failure navigator at [phone].       Thank you for choosing [facility]. It has been a pleasure taking part in your medical care. Please follow up with your primary care provider as instructed. If your symptoms should persist or worsen, please contact your primary care physician, or in the case of an emergency proceed to the nearest Emergency Room for further care. If you have any questions about the care you received, please call [facility] at [phone]. Thank you again! OMAR Bentley  *Please note the information above is to be used as a guide, follow up with your PCP for specific information related to your needs *    Please note you were treated for 3 days of antibiotics for UTI.  Please note that you will need to follow-up with your PCP to get final sensitivity.  You may need additional antibiotics.    Please make sure you schedule with Cardiology for Malathi Scan outpatient  We have stopped our Jardiance - you will need to resume this possibly with your Cardiologist outpatient

## 2025-04-10 NOTE — CONSULTS
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 Orthopedics was consulted for evaluation and treatment recommendations of her knee pain.     PMHx:  Past Medical History:   Diagnosis Date    Anesthesia of skin     Numbness and tingling    Personal history of diseases of the blood and blood-forming organs and certain disorders involving the immune mechanism     History of bleeding disorder    Personal history of malignant neoplasm, unspecified     History of malignant neoplasm    Personal history of other diseases of the circulatory system     History of hypertension    Personal history of other diseases of the musculoskeletal system and connective tissue     History of arthritis    Personal history of other endocrine, nutritional and metabolic disease     History of diabetes mellitus    Personal history of other specified conditions     History of balance disorder    Unspecified abnormalities of breathing     Breathing problem    Unspecified disorder of ear, unspecified ear     Ear, nose and throat disorder       PSHx:  Past Surgical History:   Procedure Laterality Date     OTHER SURGICAL HISTORY  07/20/2022    Cervical surgery    OTHER SURGICAL HISTORY  07/20/2022    Back surgery    OTHER SURGICAL HISTORY  07/20/2022    Knee arthroscopy       Social Hx:  Social History     Socioeconomic History    Marital status:    Tobacco Use    Smoking status: Never    Smokeless tobacco: Never   Vaping Use    Vaping status: Never Used   Substance and Sexual Activity    Alcohol use: Never    Drug use: Yes     Types: Other     Comment: CBD gummies     Social Drivers of Health     Financial Resource Strain: High Risk (4/9/2025)    Overall Financial Resource Strain (CARDIA)     Difficulty of Paying Living Expenses: Hard   Food Insecurity: No Food Insecurity (4/9/2025)    Hunger Vital Sign     Worried About Running Out of Food in the Last Year: Never true     Ran Out of Food in the Last Year: Never true   Transportation Needs: No Transportation Needs (4/9/2025)    PRAPARE - Transportation     Lack of Transportation (Medical): No     Lack of Transportation (Non-Medical): No   Intimate Partner Violence: Not At Risk (4/9/2025)    Humiliation, Afraid, Rape, and Kick questionnaire     Fear of Current or Ex-Partner: No     Emotionally Abused: No     Physically Abused: No     Sexually Abused: No   Housing Stability: Low Risk  (4/9/2025)    Housing Stability Vital Sign     Unable to Pay for Housing in the Last Year: No     Number of Times Moved in the Last Year: 1     Homeless in the Last Year: No       Family Hx:  No family history on file.    Review of Systems:   Review of Systems   Constitutional:  Negative for activity change and fever.   HENT:  Negative for congestion, ear pain, trouble swallowing and voice change.    Eyes: Negative.    Respiratory:  Negative for cough, chest tightness and shortness of breath.    Cardiovascular:  Negative for chest pain.   Gastrointestinal:  Negative for abdominal distention, abdominal pain, diarrhea, nausea and vomiting.   Endocrine: Negative.    Genitourinary:   Negative for difficulty urinating and dysuria.   Musculoskeletal:  Positive for arthralgias and gait problem. Negative for joint swelling.   Skin: Negative.    Neurological:  Positive for weakness.   Psychiatric/Behavioral: Negative.     All other systems reviewed and are negative.        Physical Examination:    Visit Vitals  BP (!) 193/82   Pulse 72   Temp 36.6 °C (97.9 °F)   Resp 18      Physical Exam  Vitals and nursing note reviewed.   Constitutional:       General: She is not in acute distress.     Appearance: Normal appearance.   HENT:      Head: Normocephalic.      Nose: Nose normal.      Mouth/Throat:      Mouth: Mucous membranes are moist.   Eyes:      Extraocular Movements: Extraocular movements intact.      Pupils: Pupils are equal, round, and reactive to light.   Cardiovascular:      Rate and Rhythm: Normal rate and regular rhythm.      Pulses: Normal pulses.      Heart sounds: Normal heart sounds.   Pulmonary:      Effort: Pulmonary effort is normal.      Breath sounds: Normal breath sounds.   Abdominal:      General: Bowel sounds are normal.      Palpations: Abdomen is soft.   Musculoskeletal:         General: Tenderness present. Normal range of motion.      Cervical back: Normal range of motion. No rigidity or tenderness.      Right lower leg: Edema present.      Left lower leg: Edema present.   Skin:     General: Skin is warm.      Capillary Refill: Capillary refill takes less than 2 seconds.   Neurological:      General: No focal deficit present.      Mental Status: She is alert and oriented to person, place, and time.   Psychiatric:         Mood and Affect: Mood normal.         LABS:  CBC:   Lab Results   Component Value Date    WBC 8.2 04/10/2025    RBC 3.52 (L) 04/10/2025    HGB 10.9 (L) 04/10/2025    HCT 32.4 (L) 04/10/2025    MCV 92 04/10/2025    MCH 31.0 04/10/2025    MCHC 33.6 04/10/2025    RDW 13.3 04/10/2025     04/10/2025     CBC with Differential:    Lab Results   Component Value  "Date    WBC 8.2 04/10/2025    RBC 3.52 (L) 04/10/2025    HGB 10.9 (L) 04/10/2025    HCT 32.4 (L) 04/10/2025     04/10/2025    MCV 92 04/10/2025    MCH 31.0 04/10/2025    MCHC 33.6 04/10/2025    RDW 13.3 04/10/2025    NRBC 0.0 04/10/2025    LYMPHOPCT 15.8 04/08/2025    MONOPCT 9.3 04/08/2025    EOSPCT 2.5 04/08/2025    BASOPCT 0.4 04/08/2025    MONOSABS 1.02 (H) 04/08/2025    LYMPHSABS 1.74 04/08/2025    EOSABS 0.28 04/08/2025    BASOSABS 0.04 04/08/2025     CMP:    Lab Results   Component Value Date     04/10/2025    K 4.4 04/10/2025     04/10/2025    CO2 28 04/10/2025    BUN 40 (H) 04/10/2025    CREATININE 1.92 (H) 04/10/2025    GLUCOSE 156 (H) 04/10/2025    PROT 6.9 04/08/2025    CALCIUM 8.7 04/10/2025    BILITOT 0.3 04/08/2025    ALKPHOS 77 04/08/2025    AST 11 04/08/2025    ALT 11 04/08/2025     BMP:    Lab Results   Component Value Date     04/10/2025    K 4.4 04/10/2025     04/10/2025    CO2 28 04/10/2025    BUN 40 (H) 04/10/2025    CREATININE 1.92 (H) 04/10/2025    CALCIUM 8.7 04/10/2025    GLUCOSE 156 (H) 04/10/2025     Magnesium:  Lab Results   Component Value Date    MG 2.17 04/08/2025     Troponin:  No results found for: \"TROPONINI\"    Imaging   Imaging  XR knee 3 views bilateral    Result Date: 4/9/2025  No acute fractures. Bilateral tricompartmental osteoarthritis, severe in the medial compartments. Moderate knee joint effusions are present bilaterally. If concern persists, MRI can be considered for further evaluation.   MACRO: None   Signed by: Joseph Sierra 4/9/2025 7:28 PM Dictation workstation:   [id number]    Vascular US Lower Extremity Venous Duplex Bilateral    Result Date: 4/8/2025  No evidence for DVT within the bilateral lower extremities.  No significant change from prior ultrasound. Signed by Sarmad Delgado MD    XR chest 1 view    Result Date: 4/8/2025  Cardiomegaly with mild interstitial prominence which could be chronic or relate to component developing " interstitial edema. Correlate clinically.   MACRO: None   Signed by: Priyanka Lowe 4/8/2025 9:24 PM Dictation workstation:   [id number]     Cardiology, Vascular, and Other Imaging  Transthoracic Echo (TTE) Complete    Result Date: 4/9/2025          [place] [id number]  Tel [phone] Fax [phone] TRANSTHORACIC ECHOCARDIOGRAM REPORT Patient Name:       MICAELAFIDEL GONZALESELL       Reading Physician:    [id number] Hossein Murillo DO Study Date:         4/9/2025            Ordering Provider:    [id number] RANDLE MRN/PID:            [id number]            Fellow: Accession#:         [id number]        Nurse:                Tano Kwon RN Date of Birth/Age:  1945 / 79      Sonographer:          Talia Nguyễn RDCS Gender Assigned at  F                   Additional Staff: Birth: Height:             172.72 cm           Admit Date:           4/8/2025 Weight:             104.78 kg           Admission Status:     Inpatient -                                                               Routine BSA / BMI:          2.17 m2 / 35.12     Department Location:  [place] [address]                                     Echo Lab Blood Pressure: 201 /85 mmHg Study Type:    TRANSTHORACIC ECHO (TTE) COMPLETE Diagnosis/ICD: Localized edema-R60.0; Heart failure, unspecified-I50.9 Indication:    Edema, Heart failure CPT Codes:     Echo Complete w Full [id number] Patient History: Diabetes:          Yes BMI:               Obese >30 Pertinent History: HTN, CHF and LE Edema. Study Detail: The following Echo studies were performed: 2D, M-Mode, Doppler and               color flow. Technically challenging study due to body habitus  and               patient lying in supine position. Definity used as a contrast               agent for endocardial border definition. Total contrast used for               this procedure was 3 mL via IV push.  PHYSICIAN INTERPRETATION: Left Ventricle: The left ventricular systolic function is mildly decreased, with a visually estimated ejection fraction of 50%. There is moderate concentric left ventricular hypertrophy. There are no regional wall motion abnormalities. The left ventricular cavity size is normal. There is moderately increased septal and moderately increased posterior left ventricular wall thickness. Spectral Doppler shows a Grade I (impaired relaxation pattern) of left ventricular diastolic filling with normal left atrial filling pressure. LV Wall Scoring: All segments are normal. Left Atrium: The left atrial size is normal. Right Ventricle: The right ventricle is normal in size. There is normal right ventricular global systolic function. Right Atrium: The right atrial size is normal. Aortic Valve: The aortic valve appears structurally normal. There is mild aortic valve cusp calcification. There is no evidence of aortic valve stenosis. The aortic valve dimensionless index is 0.61. There is no evidence of aortic valve regurgitation. The peak instantaneous gradient of the aortic valve is 13 mmHg. The mean gradient of the aortic valve is 7 mmHg. Mitral Valve: The mitral valve is normal in structure. There is no evidence of mitral valve stenosis. There is normal mitral valve leaflet mobility. There is mild mitral annular calcification. There is trace mitral valve regurgitation. Tricuspid Valve: The tricuspid valve is structurally normal. There is normal tricuspid valve leaflet mobility. There is trace to mild tricuspid regurgitation. Pulmonic Valve: The pulmonic valve is not well visualized. There is no indication of pulmonic valve regurgitation. Pericardium: No pericardial effusion noted. Aorta: The  aortic root is normal. In comparison to the previous echocardiogram(s): There are no prior studies on this patient for comparison purposes.  CONCLUSIONS:  1. The left ventricular systolic function is mildly decreased, with a visually estimated ejection fraction of 50%.  2. No regional wall motion abnormalities.  3. Spectral Doppler shows a Grade I (impaired relaxation pattern) of left ventricular diastolic filling with normal left atrial filling pressure.  4. There is normal right ventricular global systolic function.  5. Normal sized right ventricle.  6. There is no evidence of mitral valve stenosis.  7. Trace mitral valve regurgitation.  8. Trace to mild tricuspid regurgitation.  9. Aortic valve stenosis is not present. 10. There is moderately increased septal and moderately increased posterior left ventricular wall thickness. RECOMMENDATIONS: Technically suboptimal and limited study, therefore accuracy of above interpretation could be substantially diminished. Clinical correlation is advised. Consider additional imaging modalities if clinically indicated.  QUANTITATIVE DATA SUMMARY:  2D MEASUREMENTS:             Normal Ranges: IVSd:            1.43 cm     (0.6-1.1cm) LVPWd:           1.30 cm     (0.6-1.1cm) LVIDd:           4.68 cm     (3.9-5.9cm) LVIDs:           3.65 cm LV Mass Index:   116.8 g/m2 LVEDV Index:     92.83 ml/m2 LV % FS          22.0 %  LEFT ATRIUM:                  Normal Ranges: LA Vol A4C:        49.1 ml    (22+/-6mL/m2) LA Vol A2C:        56.8 ml LA Vol BP:         53.3 ml LA Vol Index A4C:  22.6ml/m2 LA Vol Index A2C:  26.1 ml/m2 LA Vol Index BP:   24.5 ml/m2 LA Area A4C:       17.5 cm2 LA Area A2C:       19.0 cm2 LA Major Axis A4C: 5.3 cm LA Major Axis A2C: 5.4 cm LA Volume Index:   23.4 ml/m2  LV SYSTOLIC FUNCTION:                      Normal Ranges: EF-A4C View:    47 % (>=55%) EF-A2C View:    48 % EF-Biplane:     47 % EF-Visual:      50 % LV EF Reported: 50 %  LV DIASTOLIC FUNCTION:            Normal Ranges: MV Peak E:             0.79 m/s  (0.7-1.2 m/s) MV Peak A:             1.21 m/s  (0.42-0.7 m/s) E/A Ratio:             0.65      (1.0-2.2) MV e'                  0.053 m/s (>8.0) MV lateral e'          0.06 m/s MV medial e'           0.05 m/s E/e' Ratio:            14.74     (<8.0)  MITRAL VALVE:          Normal Ranges: MV DT:        243 msec (150-240msec)  AORTIC VALVE:                      Normal Ranges: AoV Vmax:                1.79 m/s  (<=1.7m/s) AoV Peak P.8 mmHg (<20mmHg) AoV Mean P.0 mmHg  (1.7-11.5mmHg) LVOT Max Shyam:            1.13 m/s  (<=1.1m/s) AoV VTI:                 43.00 cm  (18-25cm) LVOT VTI:                26.10 cm LVOT Diameter:           2.00 cm   (1.8-2.4cm) AoV Area, VTI:           1.91 cm2  (2.5-5.5cm2) AoV Area,Vmax:           1.98 cm2  (2.5-4.5cm2) AoV Dimensionless Index: 0.61  RIGHT VENTRICLE: TAPSE: 25.0 mm RV s'  0.13 m/s  PULMONIC VALVE:          Normal Ranges: PV Accel Time:  98 msec  (>120ms) PV Max Shyam:     1.0 m/s  (0.6-0.9m/s) PV Max PG:      3.9 mmHg  [id number] Hossein Murillo DO Electronically signed on 2025 at 2:59:40 PM  Wall Scoring  ** Final **     ECG 12 lead    Result Date: 2025  Normal sinus rhythm Normal ECG When compared with ECG of 2022 09:48, No significant change was found See ED provider note for full interpretation and clinical correlation Confirmed by Carole Fan ([id number]) on 2025 10:49:09 AM    Point of Care Ultrasound    Result Date: 2025  These images are not reportable by radiology and will not be interpreted by  Radiologists.    ECG 12 lead (Clinic Performed)    Result Date: 2025  See scanned document         Assessment:    79 year old female patient admitted for weakness and ambulatory dysfunction. Patient with long standing history of bilateral knee pain. She is getting bilateral gel injections to her knees and has had two completed over the course of the past two weeks. She  is scheduled to have a third injection next week.     She reports that she was going to the bathroom yesterday and was not able to get up off of the toilet due to weakness in her legs. Based upon conversation with the patient, it appears that weakness in the bilateral lower extremities and swelling to her ankles is her primary concern. She does report some tenderness about her medial left knee with palpation only. She does not mention any pain to the knees in general. She does however continue to mention swelling, heaviness, and weakness in her lower extremities that her PCP is concerned about her heart and kidney function.     Bilateral knees have normal ROM active and passive, there is no erythema or warmth appreciated. She does have some tenderness to the medial aspect of the left knee with some minimal swelling.     Images were reviewed and are negative. White count is normal. Her exam and clinical presentation is not concerning for septic joint.     Recommend therapy and pain management if needed. She may WBAT to bilateral lower extremities. She can follow up as an outpatient with Dr. Budinsky who has been seeing her in the orthopedic clinic.     Thank you for this consultation and allowing us to be a part of this patient's care.     Orthopedics will sign off. Please call for any questions or concerns.            Plan:  Recommend therapy and pain management if needed  PT/OT: WBAT to bilateral lower extremities  Follow up with Dr. Budinsky in outpatient orthopedic clinic: patient has this appointment scheduled already  Ortho to sign off. Please call for any questions or concerns.      Patient seen and examined.  Status post bilateral knee gel injections in the outpatient clinic was having weakness in both legs with swelling and difficulty getting around was admitted to the medical service for other medical issues as well.  She states she is feeling somewhat better today on examination she does have moderate  effusion no redness no warmth no signs infection good range of motion.  X-rays are reviewed showing significant arthritic changes without evidence of fracture.  Impression is acute synovial reaction status post gel injection.  Plan is symptomatic treatment with ice elevation PT OT and weight-bear as tolerated follow up with Dr. Budinski as an outpatient.      This note was prepared using voice recognition software.  The details of this note are correct and have been reviewed, and corrected to the best of my ability.  Some grammatical areas may persist related to the Dragon software    Mark Ag MD  Senior Attending Physician  [facility]    [phone]

## 2025-04-10 NOTE — CONSULTS
Inpatient consult to Cardiology  Consult performed by: DANIEL Torres  Consult ordered by: DANIEL Wilks  Reason for consult: chf                                                          Date:   4/10/2025  Patient name:  Sarah Manzo  Date of admission:  4/8/2025  8:26 PM  MRN:   [id number]  YOB: 1945  Time of Consult:  9:29 AM    Consulting Cardiologist/ALEJANDRINA: GALINA Garnica CNP  Primary Cardiologist:  Dr. Olivier  Referring Provider: Dr. Russo      Admission Diagnosis:     Ambulatory dysfunction      History of Present Illness:      79-year-old female with past medical history of hypertension, diabetes mellitus type 2, bilateral knee osteoarthritis, CKD stage IIIa, prior DVT who presented to [facility] emergency department 2 days ago with complaints of 2-week history of knee pain along with weakness.  She also reports lower extremity swelling that is increased over the past few weeks.  She denies any chest pain or worsening shortness of breath.  She denies any palpitations, nausea, vomiting or fever.  She just started seeing a cardiologist at [facility] Dr. Olivier.  She denies any prior cardiac workup in the past.  Initial lab work in the emergency department showed a glucose of 219, potassium 4.5, BUN 38, creatinine 1.62, GFR 32, CRP 7.92, , troponin 17.  Repeat troponin was 18.  H&H stable.  Ultrasound of lower extremities negative for DVT.  Chest x-ray showed cardiomegaly with mild interstitial prominence which could be chronic or relate to component developing interstitial edema.  She did undergo an echocardiogram which showed low to normal LV function with estimated EF of 50%.  No regional wall motion abnormalities.  Normal-sized right ventricle.  No evidence of mitral valve stenosis.  Trace mitral valve regurgitation.  Trace to mild tricuspid regurgitation.  No aortic valve stenosis.   Moderately increased septal and moderately increased posterior left ventricular wall thickness.  EKG upon arrival to the emergency department showed normal sinus rhythm with a rate of 79.  Nonspecific ST and T wave abnormalities.  No STEMI criteria.  Due to these findings and concern for fluid overload general cardiology was consulted for congestive heart failure.    Previous Cardiac Testing:    Echocardiogram:   Recent Labs     04/09/25  1436   EF 50   LVIDD 4.68   [id number] 13.30   TAPSE 2.5   Transthoracic Echo (TTE) Complete With Contrast 04/09/2025    [place] [address]  Tel [phone] Fax [phone]    TRANSTHORACIC ECHOCARDIOGRAM REPORT    Patient Name:       MICAELA NEELY       Reading Physician:    [id number] Hossein Murillo DO  Study Date:         4/9/2025            Ordering Provider:    [id number] GIRMA RANDLE  MRN/PID:            [id number]            Fellow:  Accession#:         [id number]        Nurse:                Tano Kwon RN  Date of Birth/Age:  1945 / 79      Sonographer:          Talia Nguyễn RDCS  Gender Assigned at  F                   Additional Staff:  Birth:  Height:             172.72 cm           Admit Date:           4/8/2025  Weight:             104.78 kg           Admission Status:     Inpatient -  Routine  BSA / BMI:          2.17 m2 / 35.12     Department Location:  [place] [address]                                     Echo Lab  Blood Pressure: 201 /85 mmHg    Study Type:    TRANSTHORACIC ECHO (TTE) COMPLETE  Diagnosis/ICD: Localized edema-R60.0; Heart failure, unspecified-I50.9  Indication:    Edema, Heart failure  CPT Codes:     Echo Complete w Full [id number]    Patient History:  Diabetes:          Yes  BMI:               Obese >30  Pertinent History: HTN, CHF and LE Edema.    Study Detail: The following Echo studies were performed: 2D, M-Mode, Doppler  and  color flow. Technically challenging study due to body habitus and  patient lying in supine position. Definity used as a contrast  agent for endocardial border definition. Total contrast used for  this procedure was 3 mL via IV push.      PHYSICIAN INTERPRETATION:  Left Ventricle: The left ventricular systolic function is mildly decreased, with a visually estimated ejection fraction of 50%. There is moderate concentric left ventricular hypertrophy. There are no regional wall motion abnormalities. The left ventricular cavity size is normal. There is moderately increased septal and moderately increased posterior left ventricular wall thickness. Spectral Doppler shows a Grade I (impaired relaxation pattern) of left ventricular diastolic filling with normal left atrial filling pressure.  LV Wall Scoring:  All segments are normal.    Left Atrium: The left atrial size is normal.  Right Ventricle: The right ventricle is normal in size. There is normal right ventricular global systolic function.  Right Atrium: The right atrial size is normal.  Aortic Valve: The aortic valve appears structurally normal. There is mild aortic valve cusp calcification. There is no evidence of aortic valve stenosis.  The aortic valve dimensionless index is 0.61. There is no evidence of aortic valve regurgitation. The peak instantaneous gradient of the aortic valve is 13 mmHg. The mean gradient of the aortic valve is 7 mmHg.  Mitral Valve: The mitral valve is normal in structure. There is no evidence of mitral valve stenosis. There is normal mitral valve leaflet mobility. There is mild mitral annular calcification. There is trace mitral valve regurgitation.  Tricuspid Valve: The tricuspid valve is structurally normal. There is normal tricuspid valve leaflet mobility. There is trace to mild tricuspid regurgitation.  Pulmonic Valve: The pulmonic valve is not well visualized. There is no indication of pulmonic valve regurgitation.  Pericardium:  No pericardial effusion noted.  Aorta: The aortic root is normal.  In comparison to the previous echocardiogram(s): There are no prior studies on this patient for comparison purposes.      CONCLUSIONS:  1. The left ventricular systolic function is mildly decreased, with a visually estimated ejection fraction of 50%.  2. No regional wall motion abnormalities.  3. Spectral Doppler shows a Grade I (impaired relaxation pattern) of left ventricular diastolic filling with normal left atrial filling pressure.  4. There is normal right ventricular global systolic function.  5. Normal sized right ventricle.  6. There is no evidence of mitral valve stenosis.  7. Trace mitral valve regurgitation.  8. Trace to mild tricuspid regurgitation.  9. Aortic valve stenosis is not present.  10. There is moderately increased septal and moderately increased posterior left ventricular wall thickness.    RECOMMENDATIONS:  Technically suboptimal and limited study, therefore accuracy of above interpretation could be substantially diminished. Clinical correlation is advised. Consider additional imaging modalities if clinically indicated.      QUANTITATIVE DATA SUMMARY:    2D MEASUREMENTS:             Normal Ranges:  IVSd:            1.43 cm     (0.6-1.1cm)  LVPWd:           1.30 cm     (0.6-1.1cm)  LVIDd:           4.68 cm     (3.9-5.9cm)  LVIDs:           3.65 cm  LV Mass Index:   116.8 g/m2  LVEDV Index:     92.83 ml/m2  LV % FS          22.0 %      LEFT ATRIUM:                  Normal Ranges:  LA Vol A4C:        49.1 ml    (22+/-6mL/m2)  LA Vol A2C:        56.8 ml  LA Vol BP:         53.3 ml  LA Vol Index A4C:  22.6ml/m2  LA Vol Index A2C:  26.1 ml/m2  LA Vol Index BP:   24.5 ml/m2  LA Area A4C:       17.5 cm2  LA Area A2C:       19.0 cm2  LA Major Axis A4C: 5.3 cm  LA Major Axis A2C: 5.4 cm  LA Volume Index:   23.4 ml/m2      LV SYSTOLIC FUNCTION:  Normal Ranges:  EF-A4C View:    47 % (>=55%)  EF-A2C View:    48 %  EF-Biplane:     47  %  EF-Visual:      50 %  LV EF Reported: 50 %      LV DIASTOLIC FUNCTION:           Normal Ranges:  MV Peak E:             0.79 m/s  (0.7-1.2 m/s)  MV Peak A:             1.21 m/s  (0.42-0.7 m/s)  E/A Ratio:             0.65      (1.0-2.2)  MV e'                  0.053 m/s (>8.0)  MV lateral e'          0.06 m/s  MV medial e'           0.05 m/s  E/e' Ratio:            14.74     (<8.0)      MITRAL VALVE:          Normal Ranges:  MV DT:        243 msec (150-240msec)      AORTIC VALVE:                      Normal Ranges:  AoV Vmax:                1.79 m/s  (<=1.7m/s)  AoV Peak P.8 mmHg (<20mmHg)  AoV Mean P.0 mmHg  (1.7-11.5mmHg)  LVOT Max Shyam:            1.13 m/s  (<=1.1m/s)  AoV VTI:                 43.00 cm  (18-25cm)  LVOT VTI:                26.10 cm  LVOT Diameter:           2.00 cm   (1.8-2.4cm)  AoV Area, VTI:           1.91 cm2  (2.5-5.5cm2)  AoV Area,Vmax:           1.98 cm2  (2.5-4.5cm2)  AoV Dimensionless Index: 0.61      RIGHT VENTRICLE:  TAPSE: 25.0 mm  RV s'  0.13 m/s      PULMONIC VALVE:          Normal Ranges:  PV Accel Time:  98 msec  (>120ms)  PV Max Shyam:     1.0 m/s  (0.6-0.9m/s)  PV Max PG:      3.9 mmHg      [id number] Hossein Murillo DO  Electronically signed on 2025 at 2:59:40 PM      Wall Scoring        ** Final **    Coronary Angiography: No results found for this or any previous visit from the past 1800 days.    Nuclear:No results found for this or any previous visit from the past 1800 days.          Allergies:     Allergies   Allergen Reactions    Hydrocodone-Acetaminophen Anaphylaxis         Past Medical History:     Past Medical History:   Diagnosis Date    Anesthesia of skin     Numbness and tingling    Personal history of diseases of the blood and blood-forming organs and certain disorders involving the immune mechanism     History of bleeding disorder    Personal history of malignant neoplasm, unspecified     History of malignant neoplasm    Personal  history of other diseases of the circulatory system     History of hypertension    Personal history of other diseases of the musculoskeletal system and connective tissue     History of arthritis    Personal history of other endocrine, nutritional and metabolic disease     History of diabetes mellitus    Personal history of other specified conditions     History of balance disorder    Unspecified abnormalities of breathing     Breathing problem    Unspecified disorder of ear, unspecified ear     Ear, nose and throat disorder       Past Surgical History:     Past Surgical History:   Procedure Laterality Date    OTHER SURGICAL HISTORY  07/20/2022    Cervical surgery    OTHER SURGICAL HISTORY  07/20/2022    Back surgery    OTHER SURGICAL HISTORY  07/20/2022    Knee arthroscopy       Family History:     No family history on file.    Social History:     Social History     Tobacco Use    Smoking status: Never    Smokeless tobacco: Never   Vaping Use    Vaping status: Never Used   Substance Use Topics    Alcohol use: Never    Drug use: Yes     Types: Other     Comment: CBD gummies       CURRENT INPATIENT MEDICATIONS    amLODIPine, 5 mg, oral, Daily  aspirin, 81 mg, oral, Nightly  empagliflozin, 10 mg, oral, Daily  furosemide, 20 mg, intravenous, q12h  heparin (porcine), 5,000 Units, subcutaneous, q8h  insulin lispro, 0-5 Units, subcutaneous, TID AC  simvastatin, 80 mg, oral, Nightly  [Held by provider] valsartan, 160 mg, oral, Daily         Current Outpatient Medications   Medication Instructions    acetaminophen (TYLENOL) 1,000 mg, Every 8 hours PRN    alendronate (FOSAMAX) 70 mg, oral, Every 7 days, Take in the morning with a full glass of water, on an empty stomach, and do not take anything else by mouth or lie down for the next 30 min.    aspirin 81 mg, oral, Nightly    diclofenac sodium (VOLTAREN) 4 g, Topical, 2 times daily PRN    empagliflozin (JARDIANCE) 10 mg, oral, Daily    ergocalciferol (VITAMIN D-2) 1,250 mcg,  oral, Every 7 days    glipiZIDE XL (GLUCOTROL XL) 10 mg, oral, Daily    simvastatin (ZOCOR) 80 mg, oral, Nightly    telmisartan (MICARDIS) 80 mg, oral, Daily        Review of Systems:      12 point review of systems was obtained in detail and is negative other than that detailed above.    Vital Signs:     Vitals:    04/10/25 0048 04/10/25 0553 04/10/25 0640 04/10/25 0807   BP: (!) 194/83 (!) 210/96 (!) 193/82 167/74   BP Location: Right arm   Left arm   Patient Position: Lying   Lying   Pulse: 70  72 70   Resp: 18   16   Temp: 36.4 °C (97.5 °F)  36.6 °C (97.9 °F) 36.7 °C (98.1 °F)   TempSrc: Temporal   Temporal   SpO2: (!) 89%  95% 98%   Weight:       Height:           Intake/Output Summary (Last 24 hours) at 4/10/2025 0929  Last data filed at 4/10/2025 0916  Gross per 24 hour   Intake --   Output 3600 ml   Net -3600 ml       Wt Readings from Last 4 Encounters:   25 105 kg (231 lb 7.7 oz)   25 97.5 kg (215 lb)   25 99.5 kg (219 lb 6.4 oz)   25 101 kg (223 lb 2 oz)       Physical Examination:     Physical Exam  Vitals and nursing note reviewed.   HENT:      Head: Normocephalic.      Mouth/Throat:      Mouth: Mucous membranes are moist.   Eyes:      Pupils: Pupils are equal, round, and reactive to light.   Cardiovascular:      Rate and Rhythm: Normal rate and regular rhythm.      Pulses: Normal pulses.      Heart sounds: Normal heart sounds.   Pulmonary:      Effort: Pulmonary effort is normal.      Breath sounds: Decreased air movement present.   Abdominal:      Palpations: Abdomen is soft.   Musculoskeletal:         General: Normal range of motion.      Right lower le+ Edema present.      Left lower le+ Edema present.   Skin:     General: Skin is warm and dry.      Capillary Refill: Capillary refill takes less than 2 seconds.   Neurological:      General: No focal deficit present.      Mental Status: She is alert and oriented to person, place, and time. Mental status is at baseline.    Psychiatric:         Mood and Affect: Mood normal.           Lab:     CBC:   Results from last 7 days   Lab Units 04/10/25  0539 04/09/25  0627 04/08/25 2136   WBC AUTO x10*3/uL 8.2 9.6 11.0   RBC AUTO x10*6/uL 3.52* 3.28* 3.40*   HEMOGLOBIN g/dL 10.9* 10.1* 10.5*   HEMATOCRIT % 32.4* 31.0* 32.3*   MCV fL 92 95 95   MCH pg 31.0 30.8 30.9   MCHC g/dL 33.6 32.6 32.5   RDW % 13.3 13.2 13.3   PLATELETS AUTO x10*3/uL 221 211 227     CMP:    Results from last 7 days   Lab Units 04/10/25  0539 04/09/25  0552 04/08/25 2136   SODIUM mmol/L 140 141 141   POTASSIUM mmol/L 4.4 4.1 4.5   CHLORIDE mmol/L 104 108* 109*   CO2 mmol/L 28 26 26   BUN mg/dL 40* 34* 38*   CREATININE mg/dL 1.92* 1.59* 1.62*   GLUCOSE mg/dL 156* 151* 219*   PROTEIN TOTAL g/dL  --   --  6.9   CALCIUM mg/dL 8.7 8.6 8.7   BILIRUBIN TOTAL mg/dL  --   --  0.3   ALK PHOS U/L  --   --  77   AST U/L  --   --  11   ALT U/L  --   --  11     BMP:    Results from last 7 days   Lab Units 04/10/25  0539 04/09/25  0552 04/08/25 2136   SODIUM mmol/L 140 141 141   POTASSIUM mmol/L 4.4 4.1 4.5   CHLORIDE mmol/L 104 108* 109*   CO2 mmol/L 28 26 26   BUN mg/dL 40* 34* 38*   CREATININE mg/dL 1.92* 1.59* 1.62*   CALCIUM mg/dL 8.7 8.6 8.7   GLUCOSE mg/dL 156* 151* 219*     Magnesium:  Results from last 7 days   Lab Units 04/08/25  2136   MAGNESIUM mg/dL 2.17     Troponin:    Results from last 7 days   Lab Units 04/08/25  2312 04/08/25  2136   TROPHS ng/L 18* 17*     BNP:   Results from last 7 days   Lab Units 04/08/25  2136   BNP pg/mL 302*     Lipid Panel:         Diagnostic Studies:   @No results found for this or any previous visit.    Results for orders placed during the hospital encounter of 04/08/25    Transthoracic Echo (TTE) Complete    Narrative  [address] [address]  Tel [phone] Fax [phone]    TRANSTHORACIC ECHOCARDIOGRAM REPORT    Patient Name:       MICAELA Jeffery Physician:    [id number] Murillo  DO  Study Date:         4/9/2025            Ordering Provider:    [id number] GIRMA RANDLE  MRN/PID:            [id number]            Fellow:  Accession#:         [id number]        Nurse:                Tano Kwon RN  Date of Birth/Age:  1945 / 79      Sonographer:          Talia Nguyễn RDCS  Gender Assigned at  F                   Additional Staff:  Birth:  Height:             172.72 cm           Admit Date:           4/8/2025  Weight:             104.78 kg           Admission Status:     Inpatient -  Routine  BSA / BMI:          2.17 m2 / 35.12     Department Location:  [facility]  kg/m2                                     Echo Lab  Blood Pressure: 201 /85 mmHg    Study Type:    TRANSTHORACIC ECHO (TTE) COMPLETE  Diagnosis/ICD: Localized edema-R60.0; Heart failure, unspecified-I50.9  Indication:    Edema, Heart failure  CPT Codes:     Echo Complete w Full Doppler-[id number]    Patient History:  Diabetes:          Yes  BMI:               Obese >30  Pertinent History: HTN, CHF and LE Edema.    Study Detail: The following Echo studies were performed: 2D, M-Mode, Doppler and  color flow. Technically challenging study due to body habitus and  patient lying in supine position. Definity used as a contrast  agent for endocardial border definition. Total contrast used for  this procedure was 3 mL via IV push.      PHYSICIAN INTERPRETATION:  Left Ventricle: The left ventricular systolic function is mildly decreased, with a visually estimated ejection fraction of 50%. There is moderate concentric left ventricular hypertrophy. There are no regional wall motion abnormalities. The left ventricular cavity size is normal. There is moderately increased septal and moderately increased posterior left ventricular wall thickness. Spectral Doppler shows a Grade I (impaired relaxation pattern) of left ventricular diastolic filling with normal left atrial filling pressure.  LV  Wall Scoring:  All segments are normal.    Left Atrium: The left atrial size is normal.  Right Ventricle: The right ventricle is normal in size. There is normal right ventricular global systolic function.  Right Atrium: The right atrial size is normal.  Aortic Valve: The aortic valve appears structurally normal. There is mild aortic valve cusp calcification. There is no evidence of aortic valve stenosis.  The aortic valve dimensionless index is 0.61. There is no evidence of aortic valve regurgitation. The peak instantaneous gradient of the aortic valve is 13 mmHg. The mean gradient of the aortic valve is 7 mmHg.  Mitral Valve: The mitral valve is normal in structure. There is no evidence of mitral valve stenosis. There is normal mitral valve leaflet mobility. There is mild mitral annular calcification. There is trace mitral valve regurgitation.  Tricuspid Valve: The tricuspid valve is structurally normal. There is normal tricuspid valve leaflet mobility. There is trace to mild tricuspid regurgitation.  Pulmonic Valve: The pulmonic valve is not well visualized. There is no indication of pulmonic valve regurgitation.  Pericardium: No pericardial effusion noted.  Aorta: The aortic root is normal.  In comparison to the previous echocardiogram(s): There are no prior studies on this patient for comparison purposes.      CONCLUSIONS:  1. The left ventricular systolic function is mildly decreased, with a visually estimated ejection fraction of 50%.  2. No regional wall motion abnormalities.  3. Spectral Doppler shows a Grade I (impaired relaxation pattern) of left ventricular diastolic filling with normal left atrial filling pressure.  4. There is normal right ventricular global systolic function.  5. Normal sized right ventricle.  6. There is no evidence of mitral valve stenosis.  7. Trace mitral valve regurgitation.  8. Trace to mild tricuspid regurgitation.  9. Aortic valve stenosis is not present.  10. There is  moderately increased septal and moderately increased posterior left ventricular wall thickness.    RECOMMENDATIONS:  Technically suboptimal and limited study, therefore accuracy of above interpretation could be substantially diminished. Clinical correlation is advised. Consider additional imaging modalities if clinically indicated.      QUANTITATIVE DATA SUMMARY:    2D MEASUREMENTS:             Normal Ranges:  IVSd:            1.43 cm     (0.6-1.1cm)  LVPWd:           1.30 cm     (0.6-1.1cm)  LVIDd:           4.68 cm     (3.9-5.9cm)  LVIDs:           3.65 cm  LV Mass Index:   116.8 g/m2  LVEDV Index:     92.83 ml/m2  LV % FS          22.0 %      LEFT ATRIUM:                  Normal Ranges:  LA Vol A4C:        49.1 ml    (22+/-6mL/m2)  LA Vol A2C:        56.8 ml  LA Vol BP:         53.3 ml  LA Vol Index A4C:  22.6ml/m2  LA Vol Index A2C:  26.1 ml/m2  LA Vol Index BP:   24.5 ml/m2  LA Area A4C:       17.5 cm2  LA Area A2C:       19.0 cm2  LA Major Axis A4C: 5.3 cm  LA Major Axis A2C: 5.4 cm  LA Volume Index:   23.4 ml/m2      LV SYSTOLIC FUNCTION:  Normal Ranges:  EF-A4C View:    47 % (>=55%)  EF-A2C View:    48 %  EF-Biplane:     47 %  EF-Visual:      50 %  LV EF Reported: 50 %      LV DIASTOLIC FUNCTION:           Normal Ranges:  MV Peak E:             0.79 m/s  (0.7-1.2 m/s)  MV Peak A:             1.21 m/s  (0.42-0.7 m/s)  E/A Ratio:             0.65      (1.0-2.2)  MV e'                  0.053 m/s (>8.0)  MV lateral e'          0.06 m/s  MV medial e'           0.05 m/s  E/e' Ratio:            14.74     (<8.0)      MITRAL VALVE:          Normal Ranges:  MV DT:        243 msec (150-240msec)      AORTIC VALVE:                      Normal Ranges:  AoV Vmax:                1.79 m/s  (<=1.7m/s)  AoV Peak P.8 mmHg (<20mmHg)  AoV Mean P.0 mmHg  (1.7-11.5mmHg)  LVOT Max Shyam:            1.13 m/s  (<=1.1m/s)  AoV VTI:                 43.00 cm  (18-25cm)  LVOT VTI:                26.10  cm  LVOT Diameter:           2.00 cm   (1.8-2.4cm)  AoV Area, VTI:           1.91 cm2  (2.5-5.5cm2)  AoV Area,Vmax:           1.98 cm2  (2.5-4.5cm2)  AoV Dimensionless Index: 0.61      RIGHT VENTRICLE:  TAPSE: 25.0 mm  RV s'  0.13 m/s      PULMONIC VALVE:          Normal Ranges:  PV Accel Time:  98 msec  (>120ms)  PV Max Shyam:     1.0 m/s  (0.6-0.9m/s)  PV Max PG:      3.9 mmHg      [id number] Hossein Murillo   Electronically signed on 4/9/2025 at 2:59:40 PM      Wall Scoring        ** Final **          Radiology:     XR knee 3 views bilateral   Final Result   No acute fractures. Bilateral tricompartmental osteoarthritis, severe   in the medial compartments. Moderate knee joint effusions are present   bilaterally. If concern persists, MRI can be considered for further   evaluation.        MACRO:   None        Signed by: Joseph Sierra 4/9/2025 7:28 PM   Dictation workstation:   [id number]      Transthoracic Echo (TTE) Complete   Final Result      Vascular US Lower Extremity Venous Duplex Bilateral   Final Result   No evidence for DVT within the bilateral lower extremities.  No   significant change from prior ultrasound.   Signed by Sarmad Delgado MD      XR chest 1 view   Final Result   Cardiomegaly with mild interstitial prominence which could be chronic   or relate to component developing interstitial edema. Correlate   clinically.        MACRO:   None        Signed by: Priyanka Lowe 4/8/2025 9:24 PM   Dictation workstation:   [id number]          Problem List:     Patient Active Problem List   Diagnosis    Primary hypertension    Osteoarthritis    History of uterine cancer    History of DVT (deep vein thrombosis)    Vitamin D deficiency    Vitamin B12 deficiency    Mixed hyperlipidemia    Leg edema    Allergic rhinitis    Arthritis of knee    Shoulder arthritis    Chronic idiopathic constipation    S/P hysterectomy    Type 2 diabetes mellitus with diabetic neuropathy, without long-term current use of insulin    Class  1 obesity due to excess calories with serious comorbidity and body mass index (BMI) of 33.0 to 33.9 in adult    CKD (chronic kidney disease) stage 4, GFR 15-29 ml/min (Multi)    Microalbuminuria    Anemia    Age-related osteoporosis without current pathological fracture    Never smoked tobacco    Encounter to establish care with new provider    Other fatigue    Ambulatory dysfunction       Assessment:   Acute on chronic diastolic congestive heart failure NYHA class I  HFmrEF  Mixed hyperlipidemia  Essential hypertension  Diabetes mellitus type 2  CKD stage IIIb  History of DVT  Osteoarthritis    Plan:   Admitted to medicine.  Remains on telemetry.  Telemetry shows normal sinus rhythm.  Reviewed EKG from emergency department which shows normal sinus rhythm with nonspecific ST and T wave abnormality.  No STEMI criteria.  Supplemental O2  Monitor electrolytes, keep potassium greater than 4 and magnesium greater than 2  CKD stage IIIb with baseline creatinine around 1.6.  Nephrology has been consulted.  Avoid nephrotoxic agents.  Monitor strict I's and O's and daily weights.  Low suspicion for ACS although patient has significant risk factors including hypertension, hyperlipidemia and diabetes mellitus type 2.  She has had no prior cardiac workup in the past.  She does have mildly mildly reduced LV function with an estimated EF around 50%.  Troponins were minimally elevated and remain in a flat pattern.  Low suspicion for ACS.  Doubt plaque rupture.  Will perform outpatient Lexiscan nuclear stress test.  Patient will need Malathi scan due to lower extremity swelling and osteoarthritis  Echocardiogram shows mildly reduced LV function with estimated EF of 50%.  No significant valvular abnormalities.  No wall motion abnormalities.  Would continue with gentle diuresis.  Monitor strict I's and O's and daily weights.  Appreciate nephrology recommendations.  Agree with SGLT2 inhibitor, although has UTI and may need to hold for  now  Resume antihypertensives when okay with nephrology.  DC amlodipine with patient's lower extremity swelling.  No further cardiac recommendations at this current time, appreciate nephrology's recommendations into diuretics.  May benefit from Lasix 20 mg 3 times per week.  Message sent to schedulers to follow-up outpatient after nuclear stress test is completed.      Mann Strickland AGACNP-BC  Adult Gerontology Acute Care Nurse Practitioner  [facility] [place]   [facility]  [phone]      Of note, this documentation is completed using the Dragon Dictation system (voice recognition software). There may be spelling and/or grammatical errors that were not corrected prior to final submission.      Electronically signed by DANIEL Torres, on 4/10/2025 at 9:29 AM     Patient seen and examined in conjunction with GALINA Okeefe/CNP and agree with the evaluation as noted above.  I have personally interviewed and examined the patient.   I have personally and independently reviewed the pertinentlabs and diagnostic testing.  I have personally verified the elements of the history and physical listed above and changes, if any, are noted below.    79-year-old lady with a history of hypertension, diabetes type 2, CKD and history of prior DVT who presents with complaints of knee pain associated with weakness and increased lower extremity swelling there was a concern for possible DVT but her ultrasound of both lower extremities was negative for DVT.  Checks x-ray showed cardiomegaly for which she had echocardiogram which showed low normal LV function with EF of 50% with no gross wall motion abnormalities and no significant valvular abnormalities.  Cardiology was consulted for assistance with further management of lower extremity swelling.    The patient denied any chest pain or limiting shortness of breath with her day-to-day activities.    /63 (BP  "Location: Left arm, Patient Position: Lying)   Pulse 68   Temp 36.5 °C (97.7 °F) (Temporal)   Resp 16   Ht 1.727 m (5' 8\")   Wt 105 kg (231 lb 7.7 oz)   SpO2 95%   BMI 35.20 kg/m²     Cardiac exam reveals regular S1 and S2, no murmurs are heard.  Chest is clear to auscultation bilaterally with no wheezes or crackles.  Abdomen is soft with no palpable hepatosplenomegaly.  There is 1-2+ bilateral ankle edema    ASSESSMENT AND PLAN:  1.  Progressive lower extremity swelling: This may be multifactorial with worsening renal function and possibly contribution from amlodipine.  Agree with discontinuing amlodipine and resume other blood pressure medications when okay by renal.  2.  Mild LV dysfunction: This appears to be stable on repeat echocardiogram.  Patient has scheduled follow-up with Dr. Arreguin for continued management.  3.  CKD: Patient appears to be responding to current diuretics and will defer to nephrology for continued management of diuretics.        "

## 2025-04-10 NOTE — CARE PLAN
The patient's goals for the shift include Get some sleep    The clinical goals for the shift include Pt will have increased ambulatory ability when working with PT/OT by end of shift.

## 2025-04-10 NOTE — CONSULTS
[facility] Nephrology  Consult Note           Reason for Consult: Acute kidney injury on top of chronic kidney disease stage III  Requesting Physician:  Dr. Helga Russo MD      Chief Complaint: Difficult to ambulate secondary to 2 weeks of knee pain  History Obtained From:  patient, electronic medical record    History of Present Ilness:    79 y.o. year old female presented to the emergency department for further evaluation and management of relatively 2 weeks duration of knee pain that patient did receive local injection for the last 1 basically was the day before presentation overnight the patient did have severe intolerable pain she could not actually stand of the commode with weakness she came to the emergency room where clinical and laboratory assessment did lead to admission with the diagnosis of hypertensive emergency with fluid volume overload    Patient did have a creatinine of 1.2 and GFR of 45 at admission creatinine was 1.6 and GFR was 32 at the time of the consult creatinine continue to rise and GFR continue to drop to 1.9 and 26 respectively no associated electrolyte or acid-base imbalance with urine sediment can be considered as UTI type no observed proteinuria could be secondary to UTI and definitely none nephritic    Patient is also diabetic type II patient hypertensive with osteoarthritis and from my discussion she may be taking nonsteroidal anti-inflammatory    Ultrasound of lower extremity no evidence of DVT     past Medical History:    Past Medical History:   Diagnosis Date    Anesthesia of skin     Numbness and tingling    Personal history of diseases of the blood and blood-forming organs and certain disorders involving the immune mechanism     History of bleeding disorder    Personal history of malignant neoplasm, unspecified     History of malignant neoplasm    Personal history of other diseases of the circulatory system     History of hypertension    Personal history of other diseases of  the musculoskeletal system and connective tissue     History of arthritis    Personal history of other endocrine, nutritional and metabolic disease     History of diabetes mellitus    Personal history of other specified conditions     History of balance disorder    Unspecified abnormalities of breathing     Breathing problem    Unspecified disorder of ear, unspecified ear     Ear, nose and throat disorder        Past Surgical History:    Past Surgical History:   Procedure Laterality Date    OTHER SURGICAL HISTORY  07/20/2022    Cervical surgery    OTHER SURGICAL HISTORY  07/20/2022    Back surgery    OTHER SURGICAL HISTORY  07/20/2022    Knee arthroscopy        Home Medications:    No current facility-administered medications on file prior to encounter.     Current Outpatient Medications on File Prior to Encounter   Medication Sig Dispense Refill    acetaminophen (Tylenol) 500 mg tablet Take 2 tablets (1,000 mg) by mouth every 8 hours if needed for mild pain (1 - 3).      diclofenac sodium (Voltaren) 1 % gel Apply 4.5 inches (4 g) topically 2 times a day as needed (pain). 100 g 11    empagliflozin (Jardiance) 10 mg tablet Take 1 tablet (10 mg) by mouth once daily. 90 tablet 3    glipiZIDE XL (Glucotrol XL) 10 mg 24 hr tablet Take 1 tablet (10 mg) by mouth once daily. 90 tablet 3    telmisartan (MIcarDIS) 80 mg tablet Take 1 tablet (80 mg) by mouth once daily. 90 tablet 3    alendronate (Fosamax) 70 mg tablet Take 1 tablet (70 mg) by mouth every 7 days. Take in the morning with a full glass of water, on an empty stomach, and do not take anything else by mouth or lie down for the next 30 min. 12 tablet 3    aspirin 81 mg EC tablet Take 1 tablet (81 mg) by mouth once daily at bedtime.      ergocalciferol (Vitamin D-2) 1.25 MG (38460 UT) capsule Take 1 capsule (1,250 mcg) by mouth every 7 days. 13 capsule 3    simvastatin (Zocor) 80 mg tablet Take 1 tablet (80 mg) by mouth once daily at bedtime. 90 tablet 3     [DISCONTINUED] furosemide (Lasix) 20 mg tablet Take 1 tablet (20 mg) by mouth once daily. 90 tablet 3       Allergies:  Hydrocodone-acetaminophen    Social History:    Social History     Socioeconomic History    Marital status:      Spouse name: Not on file    Number of children: Not on file    Years of education: Not on file    Highest education level: Not on file   Occupational History    Not on file   Tobacco Use    Smoking status: Never    Smokeless tobacco: Never   Vaping Use    Vaping status: Never Used   Substance and Sexual Activity    Alcohol use: Never    Drug use: Yes     Types: Other     Comment: CBD gummies    Sexual activity: Not on file   Other Topics Concern    Not on file   Social History Narrative    Not on file     Social Drivers of Health     Financial Resource Strain: High Risk (4/9/2025)    Overall Financial Resource Strain (CARDIA)     Difficulty of Paying Living Expenses: Hard   Food Insecurity: No Food Insecurity (4/9/2025)    Hunger Vital Sign     Worried About Running Out of Food in the Last Year: Never true     Ran Out of Food in the Last Year: Never true   Transportation Needs: No Transportation Needs (4/9/2025)    PRAPARE - Transportation     Lack of Transportation (Medical): No     Lack of Transportation (Non-Medical): No   Physical Activity: Not on file   Stress: Not on file   Social Connections: Not on file   Intimate Partner Violence: Not At Risk (4/9/2025)    Humiliation, Afraid, Rape, and Kick questionnaire     Fear of Current or Ex-Partner: No     Emotionally Abused: No     Physically Abused: No     Sexually Abused: No   Housing Stability: Low Risk  (4/9/2025)    Housing Stability Vital Sign     Unable to Pay for Housing in the Last Year: No     Number of Times Moved in the Last Year: 1     Homeless in the Last Year: No       Family History:   No family history on file.    Review of Systems:   No fever or chills no productive nonproductive cough no nausea emesis or  "diarrhea no dysuria no near syncope or syncope and no any other organ system related symptoms      Physical exam:   Constitutional:  VITALS:  /74 (BP Location: Left arm, Patient Position: Lying)   Pulse 70   Temp 36.7 °C (98.1 °F) (Temporal)   Resp 16   Ht 1.727 m (5' 8\")   Wt 105 kg (231 lb 7.7 oz)   SpO2 98%   BMI 35.20 kg/m²      Wt Readings from Last 3 Encounters:   04/09/25 105 kg (231 lb 7.7 oz)   04/04/25 97.5 kg (215 lb)   03/24/25 99.5 kg (219 lb 6.4 oz)      Gen: alert, awake, nad  Head: atraumatic, normocephalic  Skin: no rash, turgor wnl  Heent:  eomi, mmm  Neck: no bruits or jvd noted, thyroid normal  Lungs:  clear to auscultation  Heart:  regular rate and rhythm, no murmurs  Abdomen:  +bs, soft, nt, nd, no hepatosplenomegaly   Extremities: no edema  Psychiatric: mood and affect appropriate; judgement and insight intact  Musculoskeletal:  Rom, muscular strength intact; digits, nails normal    Data/  CBC:   Lab Results   Component Value Date    WBC 8.2 04/10/2025    RBC 3.52 (L) 04/10/2025    HGB 10.9 (L) 04/10/2025    HCT 32.4 (L) 04/10/2025    MCV 92 04/10/2025    MCH 31.0 04/10/2025    MCHC 33.6 04/10/2025    RDW 13.3 04/10/2025     04/10/2025     BMP:    Lab Results   Component Value Date     04/10/2025    K 4.4 04/10/2025     04/10/2025    CO2 28 04/10/2025    BUN 40 (H) 04/10/2025    CREATININE 1.92 (H) 04/10/2025    CALCIUM 8.7 04/10/2025    GLUCOSE 156 (H) 04/10/2025     XR knee 3 views bilateral  Narrative: Interpreted By:  Joseph Sierra,   STUDY:  XR KNEE 3 VIEWS BILATERAL; ;  4/9/2025 6:45 pm      INDICATION:  Signs/Symptoms:pain to bilateral knees.      COMPARISON:  Bilateral knee radiographs 06/26/2023      ACCESSION NUMBER(S):  [id number]      ORDERING CLINICIAN:  MAHNAZ VIRGEN      FINDINGS:  The bones are diffusely demineralized limiting evaluation for subtle  or nondisplaced fractures.      Left knee: Tricompartmental osteoarthritis, severe in the " medial  compartment. Vascular calcifications. No dislocations. No acute  fractures. Similar appearance of the hyperdense circumscribed foci  about the knee joint which may represent loose bodies, unchanged  compared to prior. Moderate knee joint effusion.      Right knee: Tricompartmental osteoarthritis, severe in the medial  compartment. No acute fractures. No dislocations. Genu varum.  Moderate knee joint effusion. Vascular calcifications.      Impression: No acute fractures. Bilateral tricompartmental osteoarthritis, severe  in the medial compartments. Moderate knee joint effusions are present  bilaterally. If concern persists, MRI can be considered for further  evaluation.      MACRO:  None      Signed by: Joseph Sierra 4/9/2025 7:28 PM  Dictation workstation:   [id number]  Transthoracic Echo (TTE) [place] [id number]   Tel [phone] Fax [phone]    TRANSTHORACIC ECHOCARDIOGRAM REPORT    Patient Name:       MICAELA NEELY       [place] Physician:    [id number] Hossein Murillo DO  Study Date:         4/9/2025            Ordering Provider:    [id number] GIRMA RANDLE  MRN/PID:            [id number]            Fellow:  Accession#:         [id number]        Nurse:                Tano Kwon RN  Date of Birth/Age:  1945 / 79      Sonographer:          Talia Nguyễn RDCS  Gender Assigned at  F                   Additional Staff:  Birth:  Height:             172.72 cm           Admit Date:           4/8/2025  Weight:             104.78 kg           Admission Status:     Inpatient -                                                                Routine  BSA / BMI:          2.17 m2 / 35.12      Department Location:  [facility]                      kg/m2                                     Echo Lab  Blood Pressure: 201 /85 mmHg    Study Type:    TRANSTHORACIC ECHO (TTE) COMPLETE  Diagnosis/ICD: Localized edema-R60.0; Heart failure, unspecified-I50.9  Indication:    Edema, Heart failure  CPT Codes:     Echo Complete w Full [id number]    Patient History:  Diabetes:          Yes  BMI:               Obese >30  Pertinent History: HTN, CHF and LE Edema.    Study Detail: The following Echo studies were performed: 2D, M-Mode, Doppler and                color flow. Technically challenging study due to body habitus and                patient lying in supine position. Definity used as a contrast                agent for endocardial border definition. Total contrast used for                this procedure was 3 mL via IV push.       PHYSICIAN INTERPRETATION:  Left Ventricle: The left ventricular systolic function is mildly decreased, with a visually estimated ejection fraction of 50%. There is moderate concentric left ventricular hypertrophy. There are no regional wall motion abnormalities. The left ventricular cavity size is normal. There is moderately increased septal and moderately increased posterior left ventricular wall thickness. Spectral Doppler shows a Grade I (impaired relaxation pattern) of left ventricular diastolic filling with normal left atrial filling pressure.  LV Wall Scoring:  All segments are normal.    Left Atrium: The left atrial size is normal.  Right Ventricle: The right ventricle is normal in size. There is normal right ventricular global systolic function.  Right Atrium: The right atrial size is normal.  Aortic Valve: The aortic valve appears structurally normal. There is mild aortic valve cusp calcification. There is no evidence of aortic valve stenosis.  The aortic valve dimensionless index is 0.61. There is no evidence of aortic valve regurgitation. The peak instantaneous gradient of  the aortic valve is 13 mmHg. The mean gradient of the aortic valve is 7 mmHg.  Mitral Valve: The mitral valve is normal in structure. There is no evidence of mitral valve stenosis. There is normal mitral valve leaflet mobility. There is mild mitral annular calcification. There is trace mitral valve regurgitation.  Tricuspid Valve: The tricuspid valve is structurally normal. There is normal tricuspid valve leaflet mobility. There is trace to mild tricuspid regurgitation.  Pulmonic Valve: The pulmonic valve is not well visualized. There is no indication of pulmonic valve regurgitation.  Pericardium: No pericardial effusion noted.  Aorta: The aortic root is normal.  In comparison to the previous echocardiogram(s): There are no prior studies on this patient for comparison purposes.       CONCLUSIONS:   1. The left ventricular systolic function is mildly decreased, with a visually estimated ejection fraction of 50%.   2. No regional wall motion abnormalities.   3. Spectral Doppler shows a Grade I (impaired relaxation pattern) of left ventricular diastolic filling with normal left atrial filling pressure.   4. There is normal right ventricular global systolic function.   5. Normal sized right ventricle.   6. There is no evidence of mitral valve stenosis.   7. Trace mitral valve regurgitation.   8. Trace to mild tricuspid regurgitation.   9. Aortic valve stenosis is not present.  10. There is moderately increased septal and moderately increased posterior left ventricular wall thickness.    RECOMMENDATIONS:  Technically suboptimal and limited study, therefore accuracy of above interpretation could be substantially diminished. Clinical correlation is advised. Consider additional imaging modalities if clinically indicated.       QUANTITATIVE DATA SUMMARY:     2D MEASUREMENTS:             Normal Ranges:  IVSd:            1.43 cm     (0.6-1.1cm)  LVPWd:           1.30 cm     (0.6-1.1cm)  LVIDd:           4.68 cm      (3.9-5.9cm)  LVIDs:           3.65 cm  LV Mass Index:   116.8 g/m2  LVEDV Index:     92.83 ml/m2  LV % FS          22.0 %       LEFT ATRIUM:                  Normal Ranges:  LA Vol A4C:        49.1 ml    (22+/-6mL/m2)  LA Vol A2C:        56.8 ml  LA Vol BP:         53.3 ml  LA Vol Index A4C:  22.6ml/m2  LA Vol Index A2C:  26.1 ml/m2  LA Vol Index BP:   24.5 ml/m2  LA Area A4C:       17.5 cm2  LA Area A2C:       19.0 cm2  LA Major Axis A4C: 5.3 cm  LA Major Axis A2C: 5.4 cm  LA Volume Index:   23.4 ml/m2       LV SYSTOLIC FUNCTION:                       Normal Ranges:  EF-A4C View:    47 % (>=55%)  EF-A2C View:    48 %  EF-Biplane:     47 %  EF-Visual:      50 %  LV EF Reported: 50 %       LV DIASTOLIC FUNCTION:           Normal Ranges:  MV Peak E:             0.79 m/s  (0.7-1.2 m/s)  MV Peak A:             1.21 m/s  (0.42-0.7 m/s)  E/A Ratio:             0.65      (1.0-2.2)  MV e'                  0.053 m/s (>8.0)  MV lateral e'          0.06 m/s  MV medial e'           0.05 m/s  E/e' Ratio:            14.74     (<8.0)       MITRAL VALVE:          Normal Ranges:  MV DT:        243 msec (150-240msec)       AORTIC VALVE:                      Normal Ranges:  AoV Vmax:                1.79 m/s  (<=1.7m/s)  AoV Peak P.8 mmHg (<20mmHg)  AoV Mean P.0 mmHg  (1.7-11.5mmHg)  LVOT Max Shyam:            1.13 m/s  (<=1.1m/s)  AoV VTI:                 43.00 cm  (18-25cm)  LVOT VTI:                26.10 cm  LVOT Diameter:           2.00 cm   (1.8-2.4cm)  AoV Area, VTI:           1.91 cm2  (2.5-5.5cm2)  AoV Area,Vmax:           1.98 cm2  (2.5-4.5cm2)  AoV Dimensionless Index: 0.61       RIGHT VENTRICLE:  TAPSE: 25.0 mm  RV s'  0.13 m/s       PULMONIC VALVE:          Normal Ranges:  PV Accel Time:  98 msec  (>120ms)  PV Max Shyam:     1.0 m/s  (0.6-0.9m/s)  PV Max PG:      3.9 mmHg       [id number] Hossein Murillo DO  Electronically signed on 2025 at 2:59:40 PM       Wall Scoring       ** Final **  ECG 12  lead  Normal sinus rhythm  Normal ECG  When compared with ECG of 01-JUL-2022 09:48,  No significant change was found  See ED provider note for full interpretation and clinical correlation  Confirmed by Carole Fan ([id number]) on 4/9/2025 10:49:09 AM    LFT:   Lab Results   Component Value Date    AST 11 04/08/2025    ALT 11 04/08/2025    ALKPHOS 77 04/08/2025    BILITOT 0.3 04/08/2025      Urinalysis:   Lab Results   Component Value Date    SANIA 7.0 04/10/2025    PROTUR 10 (TRACE) 04/10/2025    GLUCOSEU 500 (3+) (A) 04/10/2025    BLOODU 0.03 (TRACE) (A) 04/10/2025    KETONESU NEGATIVE 04/10/2025    BILIRUBINU NEGATIVE 04/10/2025    NITRITEU NEGATIVE 04/10/2025    LEUKOCYTESU 500 Tanja/uL (A) 04/10/2025      Imaging: XR knee 3 views bilateral  Narrative: Interpreted By:  Joseph Sierra,   STUDY:  XR KNEE 3 VIEWS BILATERAL; ;  4/9/2025 6:45 pm      INDICATION:  Signs/Symptoms:pain to bilateral knees.      COMPARISON:  Bilateral knee radiographs 06/26/2023      ACCESSION NUMBER(S):  [id number]      ORDERING CLINICIAN:  MAHNAZ VIRGEN      FINDINGS:  The bones are diffusely demineralized limiting evaluation for subtle  or nondisplaced fractures.      Left knee: Tricompartmental osteoarthritis, severe in the medial  compartment. Vascular calcifications. No dislocations. No acute  fractures. Similar appearance of the hyperdense circumscribed foci  about the knee joint which may represent loose bodies, unchanged  compared to prior. Moderate knee joint effusion.      Right knee: Tricompartmental osteoarthritis, severe in the medial  compartment. No acute fractures. No dislocations. Genu varum.  Moderate knee joint effusion. Vascular calcifications.      Impression: No acute fractures. Bilateral tricompartmental osteoarthritis, severe  in the medial compartments. Moderate knee joint effusions are present  bilaterally. If concern persists, MRI can be considered for further  evaluation.      MACRO:  None      Signed by:  Joseph Sierra 4/9/2025 7:28 PM  Dictation workstation:   [id number]  Transthoracic Echo (TTE) Complete            [place] [address]   Tel [phone] Fax [phone]    TRANSTHORACIC ECHOCARDIOGRAM REPORT    Patient Name:       MICAELA NEELY       Reading Physician:    [id number] Hossein Murillo DO  Study Date:         4/9/2025            Ordering Provider:    [id number] GIRMA RANDLE  MRN/PID:            [id number]            Fellow:  Accession#:         [id number]        Nurse:                Tano Kwon RN  Date of Birth/Age:  1945 / 79      Sonographer:          Talia Nguyễn RDCS  Gender Assigned at  F                   Additional Staff:  Birth:  Height:             172.72 cm           Admit Date:           4/8/2025  Weight:             104.78 kg           Admission Status:     Inpatient -                                                                Routine  BSA / BMI:          2.17 m2 / 35.12     Department Location:  [place] [address]                                     Echo Lab  Blood Pressure: 201 /85 mmHg    Study Type:    TRANSTHORACIC ECHO (TTE) COMPLETE  Diagnosis/ICD: Localized edema-R60.0; Heart failure, unspecified-I50.9  Indication:    Edema, Heart failure  CPT Codes:     Echo Complete w Full Doppler-[id number]    Patient History:  Diabetes:          Yes  BMI:               Obese >30  Pertinent History: HTN, CHF and LE Edema.    Study Detail: The following Echo studies were performed: 2D, M-Mode, Doppler and                color flow. Technically challenging study due to body habitus and                patient lying in supine position. Definity used as a contrast                 agent for endocardial border definition. Total contrast used for                this procedure was 3 mL via IV push.       PHYSICIAN INTERPRETATION:  Left Ventricle: The left ventricular systolic function is mildly decreased, with a visually estimated ejection fraction of 50%. There is moderate concentric left ventricular hypertrophy. There are no regional wall motion abnormalities. The left ventricular cavity size is normal. There is moderately increased septal and moderately increased posterior left ventricular wall thickness. Spectral Doppler shows a Grade I (impaired relaxation pattern) of left ventricular diastolic filling with normal left atrial filling pressure.  LV Wall Scoring:  All segments are normal.    Left Atrium: The left atrial size is normal.  Right Ventricle: The right ventricle is normal in size. There is normal right ventricular global systolic function.  Right Atrium: The right atrial size is normal.  Aortic Valve: The aortic valve appears structurally normal. There is mild aortic valve cusp calcification. There is no evidence of aortic valve stenosis.  The aortic valve dimensionless index is 0.61. There is no evidence of aortic valve regurgitation. The peak instantaneous gradient of the aortic valve is 13 mmHg. The mean gradient of the aortic valve is 7 mmHg.  Mitral Valve: The mitral valve is normal in structure. There is no evidence of mitral valve stenosis. There is normal mitral valve leaflet mobility. There is mild mitral annular calcification. There is trace mitral valve regurgitation.  Tricuspid Valve: The tricuspid valve is structurally normal. There is normal tricuspid valve leaflet mobility. There is trace to mild tricuspid regurgitation.  Pulmonic Valve: The pulmonic valve is not well visualized. There is no indication of pulmonic valve regurgitation.  Pericardium: No pericardial effusion noted.  Aorta: The aortic root is normal.  In comparison to the previous echocardiogram(s):  There are no prior studies on this patient for comparison purposes.       CONCLUSIONS:   1. The left ventricular systolic function is mildly decreased, with a visually estimated ejection fraction of 50%.   2. No regional wall motion abnormalities.   3. Spectral Doppler shows a Grade I (impaired relaxation pattern) of left ventricular diastolic filling with normal left atrial filling pressure.   4. There is normal right ventricular global systolic function.   5. Normal sized right ventricle.   6. There is no evidence of mitral valve stenosis.   7. Trace mitral valve regurgitation.   8. Trace to mild tricuspid regurgitation.   9. Aortic valve stenosis is not present.  10. There is moderately increased septal and moderately increased posterior left ventricular wall thickness.    RECOMMENDATIONS:  Technically suboptimal and limited study, therefore accuracy of above interpretation could be substantially diminished. Clinical correlation is advised. Consider additional imaging modalities if clinically indicated.       QUANTITATIVE DATA SUMMARY:     2D MEASUREMENTS:             Normal Ranges:  IVSd:            1.43 cm     (0.6-1.1cm)  LVPWd:           1.30 cm     (0.6-1.1cm)  LVIDd:           4.68 cm     (3.9-5.9cm)  LVIDs:           3.65 cm  LV Mass Index:   116.8 g/m2  LVEDV Index:     92.83 ml/m2  LV % FS          22.0 %       LEFT ATRIUM:                  Normal Ranges:  LA Vol A4C:        49.1 ml    (22+/-6mL/m2)  LA Vol A2C:        56.8 ml  LA Vol BP:         53.3 ml  LA Vol Index A4C:  22.6ml/m2  LA Vol Index A2C:  26.1 ml/m2  LA Vol Index BP:   24.5 ml/m2  LA Area A4C:       17.5 cm2  LA Area A2C:       19.0 cm2  LA Major Axis A4C: 5.3 cm  LA Major Axis A2C: 5.4 cm  LA Volume Index:   23.4 ml/m2       LV SYSTOLIC FUNCTION:                       Normal Ranges:  EF-A4C View:    47 % (>=55%)  EF-A2C View:    48 %  EF-Biplane:     47 %  EF-Visual:      50 %  LV EF Reported: 50 %       LV DIASTOLIC FUNCTION:            Normal Ranges:  MV Peak E:             0.79 m/s  (0.7-1.2 m/s)  MV Peak A:             1.21 m/s  (0.42-0.7 m/s)  E/A Ratio:             0.65      (1.0-2.2)  MV e'                  0.053 m/s (>8.0)  MV lateral e'          0.06 m/s  MV medial e'           0.05 m/s  E/e' Ratio:            14.74     (<8.0)       MITRAL VALVE:          Normal Ranges:  MV DT:        243 msec (150-240msec)       AORTIC VALVE:                      Normal Ranges:  AoV Vmax:                1.79 m/s  (<=1.7m/s)  AoV Peak P.8 mmHg (<20mmHg)  AoV Mean P.0 mmHg  (1.7-11.5mmHg)  LVOT Max Shyam:            1.13 m/s  (<=1.1m/s)  AoV VTI:                 43.00 cm  (18-25cm)  LVOT VTI:                26.10 cm  LVOT Diameter:           2.00 cm   (1.8-2.4cm)  AoV Area, VTI:           1.91 cm2  (2.5-5.5cm2)  AoV Area,Vmax:           1.98 cm2  (2.5-4.5cm2)  AoV Dimensionless Index: 0.61       RIGHT VENTRICLE:  TAPSE: 25.0 mm  RV s'  0.13 m/s       PULMONIC VALVE:          Normal Ranges:  PV Accel Time:  98 msec  (>120ms)  PV Max Shyam:     1.0 m/s  (0.6-0.9m/s)  PV Max PG:      3.9 mmHg       [id number] Hossein Murillo DO  Electronically signed on 2025 at 2:59:40 PM       Wall Scoring       ** Final **  ECG 12 lead  Normal sinus rhythm  Normal ECG  When compared with ECG of 2022 09:48,  No significant change was found  See ED provider note for full interpretation and clinical correlation  Confirmed by Carole Fan ([id number]) on 2025 10:49:09 AM           Assessment/  79 y.o. year old female presented to the emergency department for further evaluation and management of relatively 2 weeks duration of knee pain that patient did receive local injection for the last 1 basically was the day before presentation overnight the patient did have severe intolerable pain she could not actually stand of the commode with weakness she came to the emergency room where clinical and laboratory assessment did lead to admission with  the diagnosis of hypertensive emergency with fluid volume overload    Patient did have a creatinine of 1.2 and GFR of 45 at admission creatinine was 1.6 and GFR was 32 at the time of the consult creatinine continue to rise and GFR continue to drop to 1.9 and 26 respectively no associated electrolyte or acid-base imbalance with urine sediment can be considered as UTI type no observed proteinuria could be secondary to UTI and definitely none nephritic  Prior to admission patient was on glipizide Jardiance 10 mg and valsartan 160 mg in addition to telmisartan 80 mg    Patient is also diabetic type II patient hypertensive with osteoarthritis and from my discussion she may be taking nonsteroidal anti-inflammatory    Ultrasound of lower extremity no evidence of DVT     Acute kidney injury most probably secondary to accelerated hypertension in the clinical setting of hypertensive urgency/emergency not associated with major electrolyte or acid-base imbalance with bilateral lower extremity edema and relatively diminished air entry bibasilar and lung exam possible element of vasomotor nephropathy if patient was combining NSAIDs and ARB    Chronic kidney disease secondary to most likely diabetic nephropathy plus or minus atherosclerotic renovascular disease hypertensive nephrosclerosis    Plan/  Diagnostically renal labs and ultrasound  Therapeutically resume ARB with close monitor electrolyte and GFR while continue to hold Jardiance because of urinary tract infection  Outpatient follow up from renal standpoint: Dr. Hunt    Thank you for the consultation.  Please do not hesitate to call with questions.    Kelsey Hunt MD

## 2025-04-10 NOTE — PROGRESS NOTES
Sarah Manzo is a 79 y.o. female on day 1 of admission presenting with Ambulatory dysfunction.      Subjective   Patient examined and seen. Alert and oriented x3, resting comfortably.  Patient denies chest pain, shortness of breath, palpitations, abdominal pain, fever or chills.  She states her legs are feeling less heavy        Objective   Xrays - nothing acute  Ortho signed off  Cardiology - pending  DEEP on CKD - UA pending         Last Recorded Vitals  /74 (BP Location: Left arm, Patient Position: Lying)   Pulse 70   Temp 36.7 °C (98.1 °F) (Temporal)   Resp 16   Wt 105 kg (231 lb 7.7 oz)   SpO2 98%   Intake/Output last 3 Shifts:    Intake/Output Summary (Last 24 hours) at 4/10/2025 0837  Last data filed at 4/10/2025 0441  Gross per 24 hour   Intake --   Output 2500 ml   Net -2500 ml       Admission Weight  Weight: 97.5 kg (215 lb) (04/08/25 2032)    Daily Weight  04/09/25 : 105 kg (231 lb 7.7 oz)    Image Results  XR knee 3 views bilateral  Narrative: Interpreted By:  Joseph Sierra,   STUDY:  XR KNEE 3 VIEWS BILATERAL; ;  4/9/2025 6:45 pm      INDICATION:  Signs/Symptoms:pain to bilateral knees.      COMPARISON:  Bilateral knee radiographs 06/26/2023      ACCESSION NUMBER(S):  [id number]      ORDERING CLINICIAN:  MAHNAZ VIRGEN      FINDINGS:  The bones are diffusely demineralized limiting evaluation for subtle  or nondisplaced fractures.      Left knee: Tricompartmental osteoarthritis, severe in the medial  compartment. Vascular calcifications. No dislocations. No acute  fractures. Similar appearance of the hyperdense circumscribed foci  about the knee joint which may represent loose bodies, unchanged  compared to prior. Moderate knee joint effusion.      Right knee: Tricompartmental osteoarthritis, severe in the medial  compartment. No acute fractures. No dislocations. Genu varum.  Moderate knee joint effusion. Vascular calcifications.      Impression: No acute fractures. Bilateral  tricompartmental osteoarthritis, severe  in the medial compartments. Moderate knee joint effusions are present  bilaterally. If concern persists, MRI can be considered for further  evaluation.      MACRO:  None      Signed by: Joseph Sierra 4/9/2025 7:28 PM  Dictation workstation:   [id number]  Transthoracic Echo (TTE) Complete            [place] [address]   Tel [phone] Fax [phone]    TRANSTHORACIC ECHOCARDIOGRAM REPORT    Patient Name:       MICAELA NEELY       Reading Physician:    [id number] Hossein Murillo DO  Study Date:         4/9/2025            Ordering Provider:    [id number] GIRMA RANDLE  MRN/PID:            [id number]            Fellow:  Accession#:         [id number]        Nurse:                Tano Kwon RN  Date of Birth/Age:  1945 / 79      Sonographer:          Talia Nguyễn RDCS  Gender Assigned at  F                   Additional Staff:  Birth:  Height:             172.72 cm           Admit Date:           4/8/2025  Weight:             104.78 kg           Admission Status:     Inpatient -                                                                Routine  BSA / BMI:          2.17 m2 / 35.12     Department Location:  [place] [address]                                     Echo Lab  Blood Pressure: 201 /85 mmHg    Study Type:    TRANSTHORACIC ECHO (TTE) COMPLETE  Diagnosis/ICD: Localized edema-R60.0; Heart failure, unspecified-I50.9  Indication:    Edema, Heart failure  CPT Codes:     Echo Complete w Full [id number]    Patient History:  Diabetes:          Yes  BMI:               Obese >30  Pertinent History: HTN, CHF and LE Edema.    Study Detail: The  following Echo studies were performed: 2D, M-Mode, Doppler and                color flow. Technically challenging study due to body habitus and                patient lying in supine position. Definity used as a contrast                agent for endocardial border definition. Total contrast used for                this procedure was 3 mL via IV push.       PHYSICIAN INTERPRETATION:  Left Ventricle: The left ventricular systolic function is mildly decreased, with a visually estimated ejection fraction of 50%. There is moderate concentric left ventricular hypertrophy. There are no regional wall motion abnormalities. The left ventricular cavity size is normal. There is moderately increased septal and moderately increased posterior left ventricular wall thickness. Spectral Doppler shows a Grade I (impaired relaxation pattern) of left ventricular diastolic filling with normal left atrial filling pressure.  LV Wall Scoring:  All segments are normal.    Left Atrium: The left atrial size is normal.  Right Ventricle: The right ventricle is normal in size. There is normal right ventricular global systolic function.  Right Atrium: The right atrial size is normal.  Aortic Valve: The aortic valve appears structurally normal. There is mild aortic valve cusp calcification. There is no evidence of aortic valve stenosis.  The aortic valve dimensionless index is 0.61. There is no evidence of aortic valve regurgitation. The peak instantaneous gradient of the aortic valve is 13 mmHg. The mean gradient of the aortic valve is 7 mmHg.  Mitral Valve: The mitral valve is normal in structure. There is no evidence of mitral valve stenosis. There is normal mitral valve leaflet mobility. There is mild mitral annular calcification. There is trace mitral valve regurgitation.  Tricuspid Valve: The tricuspid valve is structurally normal. There is normal tricuspid valve leaflet mobility. There is trace to mild tricuspid regurgitation.  Pulmonic  Valve: The pulmonic valve is not well visualized. There is no indication of pulmonic valve regurgitation.  Pericardium: No pericardial effusion noted.  Aorta: The aortic root is normal.  In comparison to the previous echocardiogram(s): There are no prior studies on this patient for comparison purposes.       CONCLUSIONS:   1. The left ventricular systolic function is mildly decreased, with a visually estimated ejection fraction of 50%.   2. No regional wall motion abnormalities.   3. Spectral Doppler shows a Grade I (impaired relaxation pattern) of left ventricular diastolic filling with normal left atrial filling pressure.   4. There is normal right ventricular global systolic function.   5. Normal sized right ventricle.   6. There is no evidence of mitral valve stenosis.   7. Trace mitral valve regurgitation.   8. Trace to mild tricuspid regurgitation.   9. Aortic valve stenosis is not present.  10. There is moderately increased septal and moderately increased posterior left ventricular wall thickness.    RECOMMENDATIONS:  Technically suboptimal and limited study, therefore accuracy of above interpretation could be substantially diminished. Clinical correlation is advised. Consider additional imaging modalities if clinically indicated.       QUANTITATIVE DATA SUMMARY:     2D MEASUREMENTS:             Normal Ranges:  IVSd:            1.43 cm     (0.6-1.1cm)  LVPWd:           1.30 cm     (0.6-1.1cm)  LVIDd:           4.68 cm     (3.9-5.9cm)  LVIDs:           3.65 cm  LV Mass Index:   116.8 g/m2  LVEDV Index:     92.83 ml/m2  LV % FS          22.0 %       LEFT ATRIUM:                  Normal Ranges:  LA Vol A4C:        49.1 ml    (22+/-6mL/m2)  LA Vol A2C:        56.8 ml  LA Vol BP:         53.3 ml  LA Vol Index A4C:  22.6ml/m2  LA Vol Index A2C:  26.1 ml/m2  LA Vol Index BP:   24.5 ml/m2  LA Area A4C:       17.5 cm2  LA Area A2C:       19.0 cm2  LA Major Axis A4C: 5.3 cm  LA Major Axis A2C: 5.4 cm  LA Volume Index:    23.4 ml/m2       LV SYSTOLIC FUNCTION:                       Normal Ranges:  EF-A4C View:    47 % (>=55%)  EF-A2C View:    48 %  EF-Biplane:     47 %  EF-Visual:      50 %  LV EF Reported: 50 %       LV DIASTOLIC FUNCTION:           Normal Ranges:  MV Peak E:             0.79 m/s  (0.7-1.2 m/s)  MV Peak A:             1.21 m/s  (0.42-0.7 m/s)  E/A Ratio:             0.65      (1.0-2.2)  MV e'                  0.053 m/s (>8.0)  MV lateral e'          0.06 m/s  MV medial e'           0.05 m/s  E/e' Ratio:            14.74     (<8.0)       MITRAL VALVE:          Normal Ranges:  MV DT:        243 msec (150-240msec)       AORTIC VALVE:                      Normal Ranges:  AoV Vmax:                1.79 m/s  (<=1.7m/s)  AoV Peak P.8 mmHg (<20mmHg)  AoV Mean P.0 mmHg  (1.7-11.5mmHg)  LVOT Max Shyam:            1.13 m/s  (<=1.1m/s)  AoV VTI:                 43.00 cm  (18-25cm)  LVOT VTI:                26.10 cm  LVOT Diameter:           2.00 cm   (1.8-2.4cm)  AoV Area, VTI:           1.91 cm2  (2.5-5.5cm2)  AoV Area,Vmax:           1.98 cm2  (2.5-4.5cm2)  AoV Dimensionless Index: 0.61       RIGHT VENTRICLE:  TAPSE: 25.0 mm  RV s'  0.13 m/s       PULMONIC VALVE:          Normal Ranges:  PV Accel Time:  98 msec  (>120ms)  PV Max Shyam:     1.0 m/s  (0.6-0.9m/s)  PV Max PG:      3.9 mmHg       [id number] Hossein Murillo DO  Electronically signed on 2025 at 2:59:40 PM       Wall Scoring       ** Final **  ECG 12 lead  Normal sinus rhythm  Normal ECG  When compared with ECG of 2022 09:48,  No significant change was found  See ED provider note for full interpretation and clinical correlation  Confirmed by Carole Fan ([id number]) on 2025 10:49:09 AM      Physical Exam  Constitutional: Well developed, awake/alert/oriented x3, , cooperative  Respiratory/Thorax: Patent airways,  normal breath sounds Cardiovascular: Regular, rate and rhythm, no murmurs, 2+ equal pulses of the extremities,  normal S 1and S 2 + 1 pitting edema in lower extremities with anasarca to knees bilaterally  Musculoskeletal: ROM intact, no joint swelling, no swelling of joints, knees are not erythemic. Swelling, but consistent to pedals, no redness noted + 1- edema  improving   Extremities: normal extremities,  no contusions or wounds seen   Skin: warm, dry, intact  Neurological: alert/oriented x 3, speech clear,   Psychiatric: appropriate mood and behavior    Relevant Results  Scheduled medications  aspirin, 81 mg, oral, Nightly  carvedilol, 6.25 mg, oral, BID  [Held by provider] empagliflozin, 10 mg, oral, Daily  furosemide, 20 mg, intravenous, q12h  heparin (porcine), 5,000 Units, subcutaneous, q8h  insulin lispro, 0-5 Units, subcutaneous, TID AC  simvastatin, 80 mg, oral, Nightly  [START ON 4/11/2025] valsartan, 320 mg, oral, Daily      Continuous medications     PRN medications  PRN medications: acetaminophen, dextrose, dextrose, glucagon, glucagon, hydrALAZINE, melatonin, polyethylene glycol    Results for orders placed or performed during the hospital encounter of 04/08/25 (from the past 24 hours)   Transthoracic Echo (TTE) Complete   Result Value Ref Range    AV mn grad 7 mmHg    AV pk eagle 1.79 m/s    LV Biplane EF 47 %    LVOT diam 2.00 cm    MV E/A ratio 0.65     Tricuspid annular plane systolic excursion 2.5 cm    LA vol index A/L 24.5 ml/m2    LV EF 50 %    RV free wall pk S' 13.30 cm/s    LVIDd 4.68 cm    Aortic Valve Area by Continuity of Peak Velocity 1.98 cm2    AV pk grad 13 mmHg    Aortic Valve Area by Continuity of VTI 1.91 cm2    LV A4C EF 47.3    POCT GLUCOSE   Result Value Ref Range    POCT Glucose 118 (H) 74 - 99 mg/dL   POCT GLUCOSE   Result Value Ref Range    POCT Glucose 259 (H) 74 - 99 mg/dL   Basic Metabolic Panel   Result Value Ref Range    Glucose 156 (H) 74 - 99 mg/dL    Sodium 140 136 - 145 mmol/L    Potassium 4.4 3.5 - 5.3 mmol/L    Chloride 104 98 - 107 mmol/L    Bicarbonate 28 21 - 32 mmol/L     Anion Gap 12 10 - 20 mmol/L    Urea Nitrogen 40 (H) 6 - 23 mg/dL    Creatinine 1.92 (H) 0.50 - 1.05 mg/dL    eGFR 26 (L) >60 mL/min/1.73m*2    Calcium 8.7 8.6 - 10.3 mg/dL   CBC   Result Value Ref Range    WBC 8.2 4.4 - 11.3 x10*3/uL    nRBC 0.0 0.0 - 0.0 /100 WBCs    RBC 3.52 (L) 4.00 - 5.20 x10*6/uL    Hemoglobin 10.9 (L) 12.0 - 16.0 g/dL    Hematocrit 32.4 (L) 36.0 - 46.0 %    MCV 92 80 - 100 fL    MCH 31.0 26.0 - 34.0 pg    MCHC 33.6 32.0 - 36.0 g/dL    RDW 13.3 11.5 - 14.5 %    Platelets 221 150 - 450 x10*3/uL   POCT GLUCOSE   Result Value Ref Range    POCT Glucose 141 (H) 74 - 99 mg/dL   Urinalysis with Reflex Culture and Microscopic   Result Value Ref Range    Color, Urine Light-Yellow Light-Yellow, Yellow, Dark-Yellow    Appearance, Urine Turbid (N) Clear    Specific Gravity, Urine 1.008 1.005 - 1.035    pH, Urine 7.0 5.0, 5.5, 6.0, 6.5, 7.0, 7.5, 8.0    Protein, Urine 10 (TRACE) NEGATIVE, 10 (TRACE), 20 (TRACE) mg/dL    Glucose, Urine 500 (3+) (A) Normal mg/dL    Blood, Urine 0.03 (TRACE) (A) NEGATIVE mg/dL    Ketones, Urine NEGATIVE NEGATIVE mg/dL    Bilirubin, Urine NEGATIVE NEGATIVE mg/dL    Urobilinogen, Urine Normal Normal mg/dL    Nitrite, Urine NEGATIVE NEGATIVE    Leukocyte Esterase, Urine 500 Tanja/uL (A) NEGATIVE   Microscopic Only, Urine   Result Value Ref Range    WBC, Urine 11-20 (A) 1-5, NONE /HPF    RBC, Urine 1-2 NONE, 1-2, 3-5 /HPF    Mucus, Urine FEW Reference range not established. /LPF   Albumin-Creatinine Ratio, Urine Random   Result Value Ref Range    Albumin, Urine Random 120.1 Not established mg/L    Creatinine, Urine Random 18.5 (L) 20.0 - 320.0 mg/dL    Albumin/Creatinine Ratio 649.2 ug/mg Creat   POCT GLUCOSE   Result Value Ref Range    POCT Glucose 186 (H) 74 - 99 mg/dL   TSH with reflex to Free T4 if abnormal   Result Value Ref Range    Thyroid Stimulating Hormone 2.73 0.44 - 3.98 mIU/L          Assessment/Plan      # Generalized Weakness   # Impaired Mobility  # chronic knee pain     Bilateral knee pains   Resent bilateral knee injections   Worsening pain bilaterally  Hx of DVT provoked 0- will get ultrasound to rule out DVT bilaterally  Doubt clot, but due to history we will review due to increase swelling  Orthopedics Consult due to recent injections   Analgesics as needed   Limit narcotics   PT/OT   Ultrasound bilateral lower extremities negative      # Hypertensive Emergency   # Suspected New Onset HF   Echo as above LVEF 50%  Cardiology Consulted appreciate recommendations    Anasarca to lower extremities - improving   Pitting edema improving   No chest pain or orthopnea   Hydralazine PRN - good results  Trops are flat   Strict Intake and Output  Daily weights       # CKD   Hx creatinine 1.5  Today 1.59 at baseline  Monitor   Limit nephrotoxic agents  Add Nephrology Consult due to uncontrolled hypertensive   Continue home medications   Bladder scan r/o obstruction   BP remains elevated  UA pending      # Diabetes Mellitus Type 2  Continue home medications  Diet Cardiac/Diabetic  Continue Sliding Scale SSI AC/HS   A1C 8.3  Encourage healthy lifestyle changes      # UA pending     Full code  DVTp: heparin SQ  Diet: cardiac / diabetic   Disposition: Acute rehab?      Time spent  36 minutes obtaining labs, imaging, recommendations, interview, assessment, examination, medication review/ordering, and EMR review.     Plan of care was discussed extensively with patient. Patient verbalized understanding through teach back method. All questions and concerns addressed upon examination.      Of note, this documentation is completed using the Dragon Dictation system (voice recognition software). There may be spelling and/or grammatical errors that were not corrected prior to final submission.           Sheree Darnell, APRN-CNP

## 2025-04-11 ENCOUNTER — PATIENT OUTREACH (OUTPATIENT)
Dept: CARE COORDINATION | Age: 80
End: 2025-04-11
Payer: COMMERCIAL

## 2025-04-11 LAB
ANION GAP SERPL CALC-SCNC: 13 MMOL/L (ref 10–20)
BUN SERPL-MCNC: 52 MG/DL (ref 6–23)
CALCIUM SERPL-MCNC: 8.6 MG/DL (ref 8.6–10.3)
CHLORIDE SERPL-SCNC: 103 MMOL/L (ref 98–107)
CO2 SERPL-SCNC: 28 MMOL/L (ref 21–32)
CREAT SERPL-MCNC: 2.11 MG/DL (ref 0.5–1.05)
EGFRCR SERPLBLD CKD-EPI 2021: 23 ML/MIN/1.73M*2
ERYTHROCYTE [DISTWIDTH] IN BLOOD BY AUTOMATED COUNT: 13.4 % (ref 11.5–14.5)
GLUCOSE BLD MANUAL STRIP-MCNC: 131 MG/DL (ref 74–99)
GLUCOSE BLD MANUAL STRIP-MCNC: 184 MG/DL (ref 74–99)
GLUCOSE BLD MANUAL STRIP-MCNC: 188 MG/DL (ref 74–99)
GLUCOSE BLD MANUAL STRIP-MCNC: 203 MG/DL (ref 74–99)
GLUCOSE SERPL-MCNC: 167 MG/DL (ref 74–99)
HCT VFR BLD AUTO: 32.4 % (ref 36–46)
HGB BLD-MCNC: 10.7 G/DL (ref 12–16)
HOLD SPECIMEN: NORMAL
MCH RBC QN AUTO: 30.3 PG (ref 26–34)
MCHC RBC AUTO-ENTMCNC: 33 G/DL (ref 32–36)
MCV RBC AUTO: 92 FL (ref 80–100)
NRBC BLD-RTO: 0 /100 WBCS (ref 0–0)
PLATELET # BLD AUTO: 239 X10*3/UL (ref 150–450)
POTASSIUM SERPL-SCNC: 4.4 MMOL/L (ref 3.5–5.3)
PTH-INTACT SERPL-MCNC: 123.2 PG/ML (ref 18.5–88)
RBC # BLD AUTO: 3.53 X10*6/UL (ref 4–5.2)
SODIUM SERPL-SCNC: 140 MMOL/L (ref 136–145)
WBC # BLD AUTO: 8.2 X10*3/UL (ref 4.4–11.3)

## 2025-04-11 PROCEDURE — 36415 COLL VENOUS BLD VENIPUNCTURE: CPT | Performed by: REGISTERED NURSE

## 2025-04-11 PROCEDURE — 2500000002 HC RX 250 W HCPCS SELF ADMINISTERED DRUGS (ALT 637 FOR MEDICARE OP, ALT 636 FOR OP/ED): Performed by: INTERNAL MEDICINE

## 2025-04-11 PROCEDURE — 80048 BASIC METABOLIC PNL TOTAL CA: CPT | Performed by: REGISTERED NURSE

## 2025-04-11 PROCEDURE — 85027 COMPLETE CBC AUTOMATED: CPT | Performed by: REGISTERED NURSE

## 2025-04-11 PROCEDURE — 97530 THERAPEUTIC ACTIVITIES: CPT | Mod: GO

## 2025-04-11 PROCEDURE — 1200000002 HC GENERAL ROOM WITH TELEMETRY DAILY

## 2025-04-11 PROCEDURE — 99232 SBSQ HOSP IP/OBS MODERATE 35: CPT | Performed by: REGISTERED NURSE

## 2025-04-11 PROCEDURE — 82947 ASSAY GLUCOSE BLOOD QUANT: CPT

## 2025-04-11 PROCEDURE — 97535 SELF CARE MNGMENT TRAINING: CPT | Mod: GO

## 2025-04-11 PROCEDURE — 2500000001 HC RX 250 WO HCPCS SELF ADMINISTERED DRUGS (ALT 637 FOR MEDICARE OP): Performed by: STUDENT IN AN ORGANIZED HEALTH CARE EDUCATION/TRAINING PROGRAM

## 2025-04-11 PROCEDURE — 2500000004 HC RX 250 GENERAL PHARMACY W/ HCPCS (ALT 636 FOR OP/ED): Performed by: REGISTERED NURSE

## 2025-04-11 PROCEDURE — 2500000002 HC RX 250 W HCPCS SELF ADMINISTERED DRUGS (ALT 637 FOR MEDICARE OP, ALT 636 FOR OP/ED): Performed by: STUDENT IN AN ORGANIZED HEALTH CARE EDUCATION/TRAINING PROGRAM

## 2025-04-11 PROCEDURE — 2500000001 HC RX 250 WO HCPCS SELF ADMINISTERED DRUGS (ALT 637 FOR MEDICARE OP): Performed by: NURSE PRACTITIONER

## 2025-04-11 PROCEDURE — 2500000004 HC RX 250 GENERAL PHARMACY W/ HCPCS (ALT 636 FOR OP/ED): Performed by: NURSE PRACTITIONER

## 2025-04-11 PROCEDURE — 99232 SBSQ HOSP IP/OBS MODERATE 35: CPT | Performed by: INTERNAL MEDICINE

## 2025-04-11 RX ORDER — VALSARTAN 320 MG/1
320 TABLET ORAL DAILY
Qty: 30 TABLET | Refills: 3 | Status: SHIPPED | OUTPATIENT
Start: 2025-04-12 | End: 2025-08-10

## 2025-04-11 RX ORDER — FUROSEMIDE 10 MG/ML
20 INJECTION INTRAMUSCULAR; INTRAVENOUS EVERY 24 HOURS
Status: DISCONTINUED | OUTPATIENT
Start: 2025-04-12 | End: 2025-04-12

## 2025-04-11 RX ADMIN — FUROSEMIDE 20 MG: 10 INJECTION, SOLUTION INTRAMUSCULAR; INTRAVENOUS at 08:46

## 2025-04-11 RX ADMIN — ACETAMINOPHEN 650 MG: 325 TABLET ORAL at 21:10

## 2025-04-11 RX ADMIN — CARVEDILOL 6.25 MG: 6.25 TABLET, FILM COATED ORAL at 21:09

## 2025-04-11 RX ADMIN — SIMVASTATIN 80 MG: 40 TABLET, FILM COATED ORAL at 21:09

## 2025-04-11 RX ADMIN — INSULIN LISPRO 1 UNITS: 100 INJECTION, SOLUTION INTRAVENOUS; SUBCUTANEOUS at 06:39

## 2025-04-11 RX ADMIN — ACETAMINOPHEN 650 MG: 325 TABLET ORAL at 09:06

## 2025-04-11 RX ADMIN — CARVEDILOL 6.25 MG: 6.25 TABLET, FILM COATED ORAL at 08:45

## 2025-04-11 RX ADMIN — CEFTRIAXONE SODIUM 1 G: 1 INJECTION, SOLUTION INTRAVENOUS at 14:47

## 2025-04-11 RX ADMIN — INSULIN LISPRO 1 UNITS: 100 INJECTION, SOLUTION INTRAVENOUS; SUBCUTANEOUS at 11:43

## 2025-04-11 RX ADMIN — VALSARTAN 320 MG: 160 TABLET, FILM COATED ORAL at 08:45

## 2025-04-11 RX ADMIN — ASPIRIN 81 MG: 81 TABLET, COATED ORAL at 21:10

## 2025-04-11 RX ADMIN — HEPARIN SODIUM 5000 UNITS: 5000 INJECTION INTRAVENOUS; SUBCUTANEOUS at 06:39

## 2025-04-11 RX ADMIN — HEPARIN SODIUM 5000 UNITS: 5000 INJECTION INTRAVENOUS; SUBCUTANEOUS at 14:47

## 2025-04-11 ASSESSMENT — COGNITIVE AND FUNCTIONAL STATUS - GENERAL
DAILY ACTIVITIY SCORE: 20
CLIMB 3 TO 5 STEPS WITH RAILING: A LOT
STANDING UP FROM CHAIR USING ARMS: A LITTLE
DRESSING REGULAR LOWER BODY CLOTHING: A LITTLE
TOILETING: A LITTLE
PERSONAL GROOMING: A LITTLE
MOVING TO AND FROM BED TO CHAIR: A LITTLE
EATING MEALS: A LITTLE
WALKING IN HOSPITAL ROOM: A LITTLE
MOBILITY SCORE: 18
TOILETING: A LITTLE
HELP NEEDED FOR BATHING: A LITTLE
DRESSING REGULAR UPPER BODY CLOTHING: A LITTLE
TURNING FROM BACK TO SIDE WHILE IN FLAT BAD: A LITTLE
DAILY ACTIVITIY SCORE: 18
DRESSING REGULAR LOWER BODY CLOTHING: A LITTLE
HELP NEEDED FOR BATHING: A LITTLE
DRESSING REGULAR UPPER BODY CLOTHING: A LITTLE

## 2025-04-11 ASSESSMENT — PAIN - FUNCTIONAL ASSESSMENT: PAIN_FUNCTIONAL_ASSESSMENT: 0-10

## 2025-04-11 ASSESSMENT — PAIN SCALES - GENERAL
PAINLEVEL_OUTOF10: 3
PAINLEVEL_OUTOF10: 0 - NO PAIN

## 2025-04-11 ASSESSMENT — ACTIVITIES OF DAILY LIVING (ADL): HOME_MANAGEMENT_TIME_ENTRY: 10

## 2025-04-11 NOTE — PROGRESS NOTES
04/09 she prefers home with HHC vs SNF  4/11 Patient conversation was incomplete at time of TCC visit but she did indicate she felt improved despite last AMPA score being 15. Will continue to follow TBD.   Medicine UPD.

## 2025-04-11 NOTE — PROGRESS NOTES
Sarah Manzo is a 79 y.o. female on day 2 of admission presenting with Ambulatory dysfunction.      Subjective   Patient examined and seen. Alert and oriented x3, resting comfortably.  Patient denies chest pain, shortness of breath, palpitations, abdominal pain, fever or chills.  She states she is feeling quite well today.     We discussed her labs and that her kidney function needs to be closely watched.        Objective     Last Recorded Vitals  /63 (BP Location: Left arm, Patient Position: Lying)   Pulse 68   Temp 36.5 °C (97.7 °F) (Temporal)   Resp 16   Wt 105 kg (231 lb 7.7 oz)   SpO2 95%   Intake/Output last 3 Shifts:  No intake or output data in the 24 hours ending 04/11/25 1615    Admission Weight  Weight: 97.5 kg (215 lb) (04/08/25 2032)    Daily Weight  04/09/25 : 105 kg (231 lb 7.7 oz)    Image Results  US renal complete  Narrative: Interpreted By:  Jamal Seo,   STUDY:  US RENAL COMPLETE;  4/10/2025 5:21 pm      INDICATION:  Signs/Symptoms:Patient with type 2 diabetes active UTI hypertensive  urgency admitted with difficult ambulation and acute kidney injury on  top of chronic kidney disease stage III for size echogenicity and  rule out hydro thank you.          COMPARISON:  None.      ACCESSION NUMBER(S):  [id number]      ORDERING CLINICIAN:  MAHNAZ VIRGEN      TECHNIQUE:  Multiple images of the kidneys were obtained  .      FINDINGS:  RIGHT KIDNEY:  The right kidney measures 9.1 cm in length. The renal cortical  echogenicity and thickness are within normal limits. No  hydronephrosis is present; no evidence of nephrolithiasis.      LEFT KIDNEY:  The left kidney measures 9.5 cm in length. The renal cortical  echogenicity and thickness are within normal limits. No  hydronephrosis is present; no evidence of nephrolithiasis.      BLADDER:  Unremarkable.      Impression: Borderline small kidneys bilaterally without hydronephrosis.      MACRO:  None      Signed by: Jamal Seo 4/10/2025  6:24 PM  Dictation workstation:   [id number]      Physical Exam  Constitutional: Well developed, awake/alert/oriented x3, , cooperative  Respiratory/Thorax: Patent airways,  normal breath sounds Cardiovascular: Regular, rate and rhythm, no murmurs, 2+ equal pulses of the extremities, normal S 1and S 2 + 1 pitting edema in lower extremities with anasarca to knees bilaterally  Musculoskeletal: ROM intact, no joint swelling, no swelling of joints, knees are not erythemic. Swelling, but consistent to pedals, no redness noted + 1- edema  improving   Extremities: normal extremities,  no contusions or wounds seen   Skin: warm, dry, intact  Neurological: alert/oriented x 3, speech clear,   Psychiatric: appropriate mood and behavior  Relevant Results  Scheduled medications  aspirin, 81 mg, oral, Nightly  carvedilol, 6.25 mg, oral, BID  cefTRIAXone, 1 g, intravenous, q24h  [Held by provider] empagliflozin, 10 mg, oral, Daily  [START ON 4/12/2025] furosemide, 20 mg, intravenous, q24h  heparin (porcine), 5,000 Units, subcutaneous, q8h  insulin lispro, 0-5 Units, subcutaneous, TID AC  simvastatin, 80 mg, oral, Nightly  valsartan, 320 mg, oral, Daily      Continuous medications     PRN medications  PRN medications: acetaminophen, dextrose, dextrose, glucagon, glucagon, hydrALAZINE, melatonin, polyethylene glycol    Results for orders placed or performed during the hospital encounter of 04/08/25 (from the past 24 hours)   POCT GLUCOSE   Result Value Ref Range    POCT Glucose 211 (H) 74 - 99 mg/dL   SST TOP   Result Value Ref Range    Extra Tube Hold for add-ons.    Basic Metabolic Panel   Result Value Ref Range    Glucose 167 (H) 74 - 99 mg/dL    Sodium 140 136 - 145 mmol/L    Potassium 4.4 3.5 - 5.3 mmol/L    Chloride 103 98 - 107 mmol/L    Bicarbonate 28 21 - 32 mmol/L    Anion Gap 13 10 - 20 mmol/L    Urea Nitrogen 52 (H) 6 - 23 mg/dL    Creatinine 2.11 (H) 0.50 - 1.05 mg/dL    eGFR 23 (L) >60 mL/min/1.73m*2    Calcium 8.6 8.6 -  10.3 mg/dL   CBC   Result Value Ref Range    WBC 8.2 4.4 - 11.3 x10*3/uL    nRBC 0.0 0.0 - 0.0 /100 WBCs    RBC 3.53 (L) 4.00 - 5.20 x10*6/uL    Hemoglobin 10.7 (L) 12.0 - 16.0 g/dL    Hematocrit 32.4 (L) 36.0 - 46.0 %    MCV 92 80 - 100 fL    MCH 30.3 26.0 - 34.0 pg    MCHC 33.0 32.0 - 36.0 g/dL    RDW 13.4 11.5 - 14.5 %    Platelets 239 150 - 450 x10*3/uL   POCT GLUCOSE   Result Value Ref Range    POCT Glucose 184 (H) 74 - 99 mg/dL   POCT GLUCOSE   Result Value Ref Range    POCT Glucose 188 (H) 74 - 99 mg/dL          Assessment/Plan        # Generalized Weakness   # Impaired Mobility  # chronic knee pain    Bilateral knee pains   Resent bilateral knee injections   Worsening pain bilaterally - resolving - pt up in chair   Hx of DVT provoked 0- will get ultrasound to rule out DVT bilaterally  Doubt clot, but due to history we will review due to increase swelling  Orthopedics Consult due to recent injections  - signed off   Analgesics as needed   Limit narcotics   PT/OT   Ultrasound bilateral lower extremities negative      # Hypertensive Emergency   # Suspected New Onset HF   Echo as above LVEF 50%  Cardiology Consulted appreciate recommendations    Anasarca to lower extremities - improving   Pitting edema improving   No chest pain or orthopnea   Hydralazine PRN - good results  Trops are flat   Strict Intake and Output  Daily weights       # CKD   Hx creatinine 1.5  Today 2.11    Monitor   Limit nephrotoxic agents  Add Nephrology Consult due to uncontrolled hypertensive  and elevated creatinine  Continue home medications   Bladder scan r/o obstruction      # Diabetes Mellitus Type 2  Continue home medications  Diet Cardiac/Diabetic  Continue Sliding Scale SSI AC/HS   A1C 8.3  Encourage healthy lifestyle changes      # UA pending    positive  IV Rocephin        Full code  DVTp: heparin SQ  Diet: cardiac / diabetic   Disposition: Acute rehab? Ask TCC on homegoing needs / possible Dc tomorrow      Time spent  36  minutes obtaining labs, imaging, recommendations, interview, assessment, examination, medication review/ordering, and EMR review.     Plan of care was discussed extensively with patient. Patient verbalized understanding through teach back method. All questions and concerns addressed upon examination.      Of note, this documentation is completed using the Dragon Dictation system (voice recognition software). There may be spelling and/or grammatical errors that were not corrected prior to final submission.                    Sheree Darnell, APRN-CNP

## 2025-04-11 NOTE — CARE PLAN
"The patient's goals for the shift include \"kidneys doing better, so I can go home\".    The clinical goals for the shift include Pt will exhibit increased ambulatory ability today for a home dc vs rehab.      "

## 2025-04-11 NOTE — PROGRESS NOTES
[place] ALEJANDRINA/Cardiologist:  Mann Strickland, GALINA-OMAR, Dr. Wero Cavazos  Primary Cardiologist: Dr. Olivier  Date:  4/11/2025  Patient:  Sarah Manzo  YOB: 1945  MRN:  [id number]   Admit Date:  4/8/2025      SUBJECTIVE:    Sarah Manzo was seen and examined today at bedside.     She denies any chest pain or shortness of breath.     Breathing is much better    Fluid balance -1100 in the past 24 hours    BP is much better        VITALS:     Vitals:    04/10/25 1449 04/10/25 1950 04/11/25 0034 04/11/25 0756   BP: 121/65 143/64 161/68 130/63   BP Location:  Left arm Left arm Left arm   Patient Position: Sitting Sitting Lying Lying   Pulse: 69 72 66 68   Resp: 18 18 18 16   Temp: 36 °C (96.8 °F) 37.1 °C (98.8 °F) 36.6 °C (97.9 °F) 36.5 °C (97.7 °F)   TempSrc:  Temporal Temporal Temporal   SpO2: 97% 95% 94% 95%   Weight:       Height:           Intake/Output Summary (Last 24 hours) at 4/11/2025 0854  Last data filed at 4/10/2025 1556  Gross per 24 hour   Intake 50 ml   Output 1100 ml   Net -1050 ml       Wt Readings from Last 4 Encounters:   04/09/25 105 kg (231 lb 7.7 oz)   04/04/25 97.5 kg (215 lb)   03/24/25 99.5 kg (219 lb 6.4 oz)   02/11/25 101 kg (223 lb 2 oz)       CURRENT HOSPITAL MEDICATIONS:   aspirin, 81 mg, oral, Nightly  carvedilol, 6.25 mg, oral, BID  cefTRIAXone, 1 g, intravenous, q24h  [Held by provider] empagliflozin, 10 mg, oral, Daily  furosemide, 20 mg, intravenous, q12h  heparin (porcine), 5,000 Units, subcutaneous, q8h  insulin lispro, 0-5 Units, subcutaneous, TID AC  simvastatin, 80 mg, oral, Nightly  valsartan, 320 mg, oral, Daily         Current Outpatient Medications   Medication Instructions    acetaminophen (TYLENOL) 1,000 mg, Every 8 hours PRN    alendronate (FOSAMAX) 70 mg, oral, Every 7 days, Take in the morning with a full glass of water, on an empty stomach, and do not take anything else by mouth or lie down for the next 30 min.    aspirin 81 mg,  oral, Nightly    carvedilol (COREG) 6.25 mg, oral, 2 times daily (morning and late afternoon)    diclofenac sodium (VOLTAREN) 4 g, Topical, 2 times daily PRN    empagliflozin (JARDIANCE) 10 mg, oral, Daily    ergocalciferol (VITAMIN D-2) 1,250 mcg, oral, Every 7 days    glipiZIDE XL (GLUCOTROL XL) 10 mg, oral, Daily    simvastatin (ZOCOR) 80 mg, oral, Nightly    telmisartan (MICARDIS) 80 mg, oral, Daily        PHYSICAL EXAMINATION:     Physical Exam  HENT:      Head: Normocephalic.      Mouth/Throat:      Mouth: Mucous membranes are moist.   Eyes:      Pupils: Pupils are equal, round, and reactive to light.   Cardiovascular:      Rate and Rhythm: Normal rate and regular rhythm.      Heart sounds: Normal heart sounds.   Pulmonary:      Effort: Pulmonary effort is normal.      Breath sounds: Decreased air movement present.   Abdominal:      General: Bowel sounds are normal.      Palpations: Abdomen is soft.   Musculoskeletal:         General: Normal range of motion.      Right lower le+ Edema present.      Left lower le+ Edema present.   Skin:     General: Skin is warm and dry.      Capillary Refill: Capillary refill takes less than 2 seconds.   Neurological:      Mental Status: She is alert and oriented to person, place, and time.   Psychiatric:         Mood and Affect: Mood normal.         LAB DATA:     CBC:   Results from last 7 days   Lab Units 25  0550 04/10/25  0539 25  0627   WBC AUTO x10*3/uL 8.2 8.2 9.6   RBC AUTO x10*6/uL 3.53* 3.52* 3.28*   HEMOGLOBIN g/dL 10.7* 10.9* 10.1*   HEMATOCRIT % 32.4* 32.4* 31.0*   MCV fL 92 92 95   MCH pg 30.3 31.0 30.8   MCHC g/dL 33.0 33.6 32.6   RDW % 13.4 13.3 13.2   PLATELETS AUTO x10*3/uL 239 221 211     CMP:    Results from last 7 days   Lab Units 25  0550 04/10/25  0539 25  0552 25  2136   SODIUM mmol/L 140 140 141 141   POTASSIUM mmol/L 4.4 4.4 4.1 4.5   CHLORIDE mmol/L 103 104 108* 109*   CO2 mmol/L 28 28 26 26   BUN mg/dL 52* 40*  34* 38*   CREATININE mg/dL 2.11* 1.92* 1.59* 1.62*   GLUCOSE mg/dL 167* 156* 151* 219*   PROTEIN TOTAL g/dL  --   --   --  6.9   CALCIUM mg/dL 8.6 8.7 8.6 8.7   BILIRUBIN TOTAL mg/dL  --   --   --  0.3   ALK PHOS U/L  --   --   --  77   AST U/L  --   --   --  11   ALT U/L  --   --   --  11     BMP:    Results from last 7 days   Lab Units 04/11/25  0550 04/10/25  0539 04/09/25  0552   SODIUM mmol/L 140 140 141   POTASSIUM mmol/L 4.4 4.4 4.1   CHLORIDE mmol/L 103 104 108*   CO2 mmol/L 28 28 26   BUN mg/dL 52* 40* 34*   CREATININE mg/dL 2.11* 1.92* 1.59*   CALCIUM mg/dL 8.6 8.7 8.6   GLUCOSE mg/dL 167* 156* 151*     Magnesium:  Results from last 7 days   Lab Units 04/08/25  2136   MAGNESIUM mg/dL 2.17     Troponin:    Results from last 7 days   Lab Units 04/08/25  2312 04/08/25  2136   TROPHS ng/L 18* 17*     BNP:   Results from last 7 days   Lab Units 04/08/25  2136   BNP pg/mL 302*     Lipid Panel:         DIAGNOSTIC TESTING:   @No results found for this or any previous visit.    Echocardiogram: Results for orders placed during the hospital encounter of 04/08/25    Transthoracic Echo (TTE) Complete    [place] [id number]  Tel [phone] Fax [phone]    TRANSTHORACIC ECHOCARDIOGRAM REPORT    Patient Name:       MICAELA FLORINDA Jeffery Physician:    [id number] Hossein Murillo DO  Study Date:         4/9/2025            Ordering Provider:    [id number] GIRMA RANDLE  MRN/PID:            [id number]            Fellow:  Accession#:         [id number]        Nurse:                Tano Kwon RN  Date of Birth/Age:  1945 / 79      Sonographer:          Talia Nguyễn RDCS  Gender Assigned at  F                   Additional Staff:  Birth:  Height:             172.72 cm           Admit Date:           4/8/2025  Weight:             104.78 kg           Admission Status:     Inpatient -  Routine  BSA / BMI:           2.17 m2 / 35.12     Department Location:  [facility]  kg/m2                                     Echo Lab  Blood Pressure: 201 /85 mmHg    Study Type:    TRANSTHORACIC ECHO (TTE) COMPLETE  Diagnosis/ICD: Localized edema-R60.0; Heart failure, unspecified-I50.9  Indication:    Edema, Heart failure  CPT Codes:     Echo Complete w Full [id number]    Patient History:  Diabetes:          Yes  BMI:               Obese >30  Pertinent History: HTN, CHF and LE Edema.    Study Detail: The following Echo studies were performed: 2D, M-Mode, Doppler and  color flow. Technically challenging study due to body habitus and  patient lying in supine position. Definity used as a contrast  agent for endocardial border definition. Total contrast used for  this procedure was 3 mL via IV push.      PHYSICIAN INTERPRETATION:  Left Ventricle: The left ventricular systolic function is mildly decreased, with a visually estimated ejection fraction of 50%. There is moderate concentric left ventricular hypertrophy. There are no regional wall motion abnormalities. The left ventricular cavity size is normal. There is moderately increased septal and moderately increased posterior left ventricular wall thickness. Spectral Doppler shows a Grade I (impaired relaxation pattern) of left ventricular diastolic filling with normal left atrial filling pressure.  LV Wall Scoring:  All segments are normal.    Left Atrium: The left atrial size is normal.  Right Ventricle: The right ventricle is normal in size. There is normal right ventricular global systolic function.  Right Atrium: The right atrial size is normal.  Aortic Valve: The aortic valve appears structurally normal. There is mild aortic valve cusp calcification. There is no evidence of aortic valve stenosis.  The aortic valve dimensionless index is 0.61. There is no evidence of aortic valve regurgitation. The peak instantaneous gradient of the aortic valve is 13 mmHg. The mean gradient of the  aortic valve is 7 mmHg.  Mitral Valve: The mitral valve is normal in structure. There is no evidence of mitral valve stenosis. There is normal mitral valve leaflet mobility. There is mild mitral annular calcification. There is trace mitral valve regurgitation.  Tricuspid Valve: The tricuspid valve is structurally normal. There is normal tricuspid valve leaflet mobility. There is trace to mild tricuspid regurgitation.  Pulmonic Valve: The pulmonic valve is not well visualized. There is no indication of pulmonic valve regurgitation.  Pericardium: No pericardial effusion noted.  Aorta: The aortic root is normal.  In comparison to the previous echocardiogram(s): There are no prior studies on this patient for comparison purposes.      CONCLUSIONS:  1. The left ventricular systolic function is mildly decreased, with a visually estimated ejection fraction of 50%.  2. No regional wall motion abnormalities.  3. Spectral Doppler shows a Grade I (impaired relaxation pattern) of left ventricular diastolic filling with normal left atrial filling pressure.  4. There is normal right ventricular global systolic function.  5. Normal sized right ventricle.  6. There is no evidence of mitral valve stenosis.  7. Trace mitral valve regurgitation.  8. Trace to mild tricuspid regurgitation.  9. Aortic valve stenosis is not present.  10. There is moderately increased septal and moderately increased posterior left ventricular wall thickness.    RECOMMENDATIONS:  Technically suboptimal and limited study, therefore accuracy of above interpretation could be substantially diminished. Clinical correlation is advised. Consider additional imaging modalities if clinically indicated.      QUANTITATIVE DATA SUMMARY:    2D MEASUREMENTS:             Normal Ranges:  IVSd:            1.43 cm     (0.6-1.1cm)  LVPWd:           1.30 cm     (0.6-1.1cm)  LVIDd:           4.68 cm     (3.9-5.9cm)  LVIDs:           3.65 cm  LV Mass Index:   116.8 g/m2  LVEDV  Index:     92.83 ml/m2  LV % FS          22.0 %      LEFT ATRIUM:                  Normal Ranges:  LA Vol A4C:        49.1 ml    (22+/-6mL/m2)  LA Vol A2C:        56.8 ml  LA Vol BP:         53.3 ml  LA Vol Index A4C:  22.6ml/m2  LA Vol Index A2C:  26.1 ml/m2  LA Vol Index BP:   24.5 ml/m2  LA Area A4C:       17.5 cm2  LA Area A2C:       19.0 cm2  LA Major Axis A4C: 5.3 cm  LA Major Axis A2C: 5.4 cm  LA Volume Index:   23.4 ml/m2      LV SYSTOLIC FUNCTION:  Normal Ranges:  EF-A4C View:    47 % (>=55%)  EF-A2C View:    48 %  EF-Biplane:     47 %  EF-Visual:      50 %  LV EF Reported: 50 %      LV DIASTOLIC FUNCTION:           Normal Ranges:  MV Peak E:             0.79 m/s  (0.7-1.2 m/s)  MV Peak A:             1.21 m/s  (0.42-0.7 m/s)  E/A Ratio:             0.65      (1.0-2.2)  MV e'                  0.053 m/s (>8.0)  MV lateral e'          0.06 m/s  MV medial e'           0.05 m/s  E/e' Ratio:            14.74     (<8.0)      MITRAL VALVE:          Normal Ranges:  MV DT:        243 msec (150-240msec)      AORTIC VALVE:                      Normal Ranges:  AoV Vmax:                1.79 m/s  (<=1.7m/s)  AoV Peak P.8 mmHg (<20mmHg)  AoV Mean P.0 mmHg  (1.7-11.5mmHg)  LVOT Max Shyam:            1.13 m/s  (<=1.1m/s)  AoV VTI:                 43.00 cm  (18-25cm)  LVOT VTI:                26.10 cm  LVOT Diameter:           2.00 cm   (1.8-2.4cm)  AoV Area, VTI:           1.91 cm2  (2.5-5.5cm2)  AoV Area,Vmax:           1.98 cm2  (2.5-4.5cm2)  AoV Dimensionless Index: 0.61      RIGHT VENTRICLE:  TAPSE: 25.0 mm  RV s'  0.13 m/s      PULMONIC VALVE:          Normal Ranges:  PV Accel Time:  98 msec  (>120ms)  PV Max Shyam:     1.0 m/s  (0.6-0.9m/s)  PV Max PG:      3.9 mmHg      [id number] Hossein Murillo DO  Electronically signed on 2025 at 2:59:40 PM      Wall Scoring        ** Final **      Coronary Angiography: No results found for this or any previous visit from the past 1800  days.            RADIOLOGY:     US renal complete   Final Result   Borderline small kidneys bilaterally without hydronephrosis.        MACRO:   None        Signed by: Jamal Seo 4/10/2025 6:24 PM   Dictation workstation:   [id number]      XR knee 3 views bilateral   Final Result   No acute fractures. Bilateral tricompartmental osteoarthritis, severe   in the medial compartments. Moderate knee joint effusions are present   bilaterally. If concern persists, MRI can be considered for further   evaluation.        MACRO:   None        Signed by: Joseph Sierra 4/9/2025 7:28 PM   Dictation workstation:   [id number]      Transthoracic Echo (TTE) Complete   Final Result      Vascular US Lower Extremity Venous Duplex Bilateral   Final Result   No evidence for DVT within the bilateral lower extremities.  No   significant change from prior ultrasound.   Signed by Sarmad Delgado MD      XR chest 1 view   Final Result   Cardiomegaly with mild interstitial prominence which could be chronic   or relate to component developing interstitial edema. Correlate   clinically.        MACRO:   None        Signed by: Priyanka Lowe 4/8/2025 9:24 PM   Dictation workstation:   [id number]      Nuclear Stress Test    (Results Pending)       PROBLEM LIST     Patient Active Problem List   Diagnosis    Primary hypertension    Osteoarthritis    History of uterine cancer    History of DVT (deep vein thrombosis)    Vitamin D deficiency    Vitamin B12 deficiency    Mixed hyperlipidemia    Leg edema    Allergic rhinitis    Arthritis of knee    Shoulder arthritis    Chronic idiopathic constipation    S/P hysterectomy    Type 2 diabetes mellitus with diabetic neuropathy, without long-term current use of insulin    Class 1 obesity due to excess calories with serious comorbidity and body mass index (BMI) of 33.0 to 33.9 in adult    CKD (chronic kidney disease) stage 4, GFR 15-29 ml/min (Multi)    Microalbuminuria    Anemia    Age-related osteoporosis  without current pathological fracture    Never smoked tobacco    Encounter to establish care with new provider    Other fatigue    Ambulatory dysfunction       ASSESSMENT:   Acute on chronic diastolic congestive heart failure NYHA class I  HFmrEF  Mixed hyperlipidemia  Essential hypertension  Diabetes mellitus type 2  CKD stage IIIb  History of DVT  Osteoarthritis        PLAN:   Admitted to medicine.  Remains on telemetry.  Telemetry shows normal sinus rhythm.  Reviewed EKG from emergency department which shows normal sinus rhythm with nonspecific ST and T wave abnormality.  No STEMI criteria.  Supplemental O2  Monitor electrolytes, keep potassium greater than 4 and magnesium greater than 2  CKD stage IIIb with baseline creatinine around 1.6.  Nephrology has been consulted.  Avoid nephrotoxic agents.  Monitor strict I's and O's and daily weights.  Fluid balance currently -1100  Low suspicion for ACS although patient has significant risk factors including hypertension, hyperlipidemia and diabetes mellitus type 2.  She has had no prior cardiac workup in the past.  She does have mildly mildly reduced LV function with an estimated EF around 50%.  Troponins were minimally elevated and remain in a flat pattern.  Low suspicion for ACS.  Doubt plaque rupture.  Will perform outpatient Lexiscan nuclear stress test.  Patient will need Malathi scan due to lower extremity swelling and osteoarthritis  Echocardiogram shows mildly reduced LV function with estimated EF of 50%.  No significant valvular abnormalities.  No wall motion abnormalities.    Agree with SGLT2 inhibitor, although has UTI and may need to hold for now.  Can reevaluate resuming in the outpatient setting  Resume antihypertensives when okay with nephrology.  DC amlodipine with patient's lower extremity swelling.  No further cardiac recommendations at this current time, appreciate nephrology's recommendations into diuretics.  May benefit from Lasix 20 mg 3 times per  week.  Message sent to schedulers to follow-up outpatient after nuclear stress test is completed.  Will sign off        Mann Strickland [facility]  Adult Gerontology Acute Care Nurse Practitioner  [facility] [place]   [facility]  [phone]          Of note, this documentation is completed using the Dragon Dictation system (voice recognition software). There may be spelling and/or grammatical errors that were not corrected prior to final submission.    Please do not hesitate to call with questions.  Electronically signed by DANIEL Torres, on 4/11/2025 at 8:54 AM   Patient seen and examined in conjunction with AGLINA Okeefe/CNP and agree with the evaluation as noted above.  Patient feels much better with improved breathing following diuresis.  She continues to deny any chest pain.  There are no significant arrhythmias overnight.  At this time, we will continue current cardiac medications and schedule follow-up with primary cardiology Dr. Arreguin.  General cardiology will sign off.  AKA

## 2025-04-11 NOTE — PROGRESS NOTES
Occupational Therapy    OT Treatment    Patient Name: Sarah Manzo  MRN: [id number]  Department: [facility]  Room: [facility]  Today's Date: 4/11/2025  Time Calculation  Start Time: 1330  Stop Time: 1402  Time Calculation (min): 32 min        Assessment:  OT Assessment: Session focused on increasing IND and safety with functional mobility and functional transfers. Pt demo improved IND and carryover of techniques this date (CGA for all mobility and transfers). Extensive conversation regarding home going, pt states she would prefer home health therapy or outpatient therapy upon d/c.  Prognosis: Good  End of Session Communication: Bedside nurse  End of Session Patient Position: Up in chair, Alarm off, not on at start of session (BLE elevated, call light within reach)  Prognosis: Good  Plan:  Treatment Interventions: ADL retraining, Functional transfer training, UE strengthening/ROM, Endurance training  OT Frequency: 2 times per week  OT Discharge Recommendations: Low intensity level of continued care  OT Recommended Transfer Status: Assist of 1  OT - OK to Discharge: Yes (Once medically appropriate.)  Treatment Interventions: ADL retraining, Functional transfer training, UE strengthening/ROM, Endurance training    Subjective   Previous Visit Info:  OT Last Visit  OT Received On: 04/11/25  General:  General  Reason for Referral: ADL impairment  Referred By: OT/PT [place] 4/9  Past Medical History Relevant to Rehab: HLD,HTN,Nueropathy,OA,CKD,Anemia,DM,DVT  Family/Caregiver Present: No  Prior to Session Communication: Bedside nurse  Patient Position Received: Up in chair, Alarm off, not on at start of session  General Comment: Pt pleasant and agreeable to OT treatment.  Precautions:  Medical Precautions: Fall precautions            Pain:  Pain Assessment  Pain Assessment: 0-10  0-10 (Numeric) Pain Score: 0 - No pain    Objective    Cognition:  Cognition  Overall Cognitive Status: Within Functional Limits  Orientation Level:  Oriented X4       Bed Mobility/Transfers: Transfers  Transfer:  (x3 sit to/from stand with CGA, cues for hand placement with good carryover)      Functional Mobility:  Functional Mobility  Functional Mobility Performed:  (Pt completed functional mobility max household distances using FWW throughout room and hallways with CGA. Cues for technique with good carryover.)  Sitting Balance:  Static Sitting Balance  Static Sitting-Comment/Number of Minutes: Good  Dynamic Sitting Balance  Dynamic Sitting-Comments: Good  Standing Balance:  Static Standing Balance  Static Standing-Comment/Number of Minutes: Fair  Dynamic Standing Balance  Dynamic Standing-Comments: Fair    Outcome Measures:[facility] Daily Activity  Putting on and taking off regular lower body clothing: A little  Bathing (including washing, rinsing, drying): A little  Putting on and taking off regular upper body clothing: A little  Toileting, which includes using toilet, bedpan or urinal: A little  Taking care of personal grooming such as brushing teeth: A little  Eating Meals: A little  Daily Activity - Total Score: 18        Education Documentation  ADL Training, taught by Millicent Ramos OT at 4/11/2025  4:29 PM.  Learner: Patient  Readiness: Acceptance  Method: Explanation, Demonstration  Response: Verbalizes Understanding, Demonstrated Understanding, Needs Reinforcement    Body Mechanics, taught by Millicent Ramos OT at 4/11/2025  4:29 PM.  Learner: Patient  Readiness: Acceptance  Method: Explanation, Demonstration  Response: Verbalizes Understanding, Demonstrated Understanding, Needs Reinforcement      Goals:  Encounter Problems       Encounter Problems (Active)       OT Goals       Patient will complete functional mobility with a FWW at CGA level.  (Met)       Start:  04/09/25    Expected End:  04/23/25    Resolved:  04/11/25    Updated to: Patient will complete functional mobility with a FWW at MOD I level.    Update reason: goal met         Patient will  complete functional transfers with a FWW at CGA level.  (Met)       Start:  04/09/25    Expected End:  04/23/25    Resolved:  04/11/25    Updated to: Patient will complete functional transfers with a FWW at MOD I level.    Update reason: goal met         Patient will complete lower body dressing at a CGA level.  (Progressing)       Start:  04/09/25    Expected End:  04/23/25            Patient will demonstrate fair dynamic standing balance during functional tasks. (Met)       Start:  04/09/25    Expected End:  04/23/25    Resolved:  04/11/25    Updated to: Patient will demonstrate fair+ dynamic standing balance during functional tasks.    Update reason: goal met         Patient will complete toileting at a CGA level. (Met)       Start:  04/09/25    Expected End:  04/23/25    Resolved:  04/11/25    Updated to: Patient will complete toileting at a MOD I level.    Update reason: goal met         Patient will complete functional mobility with a FWW at MOD I level. (Progressing)       Start:  04/11/25    Expected End:  04/23/25                Patient will demonstrate fair+ dynamic standing balance during functional tasks. (Progressing)       Start:  04/11/25    Expected End:  04/23/25                Patient will complete toileting at a MOD I level. (Progressing)       Start:  04/11/25    Expected End:  04/23/25                Patient will complete functional transfers with a FWW at MOD I level. (Progressing)       Start:  04/11/25    Expected End:  04/23/25

## 2025-04-11 NOTE — PROGRESS NOTES
Daily Call Note:       Pt states that she received a text message to give us a call about the program  She is currently in the hospital   Explained the program and she states she is interested  Let pt know that she can either call us when she gets out or we will give her a call       Pt Education:   Barriers:   Topics for Daily Review:   Pt demonstrates clear understanding:    Daily Weight:  There were no vitals filed for this visit.   Last 3 Weights:  Wt Readings from Last 7 Encounters:   04/09/25 105 kg (231 lb 7.7 oz)   04/04/25 97.5 kg (215 lb)   03/24/25 99.5 kg (219 lb 6.4 oz)   02/11/25 101 kg (223 lb 2 oz)   07/20/22 95.3 kg (210 lb)       Masimo Device:    Masimo Clinical Impression:     Virtual Visits--Scheduled (Most Recent Date at Top)  Follow up Appointments  Recent Visits  Date Type Provider Dept   [facility]   Showing recent visits within past 30 days and meeting all other requirements  Future Appointments  No visits were found meeting these conditions.  Showing future appointments within next 90 days and meeting all other requirements       Frequency of RN Calls & Virtual Visits per Team Agreement:     Medication issues Addressed (what was done):     Follow up appointments scheduled by [facility] Staff:   Referrals made by [facility] staff:

## 2025-04-11 NOTE — PROGRESS NOTES
Renal Progress Note  Assessment/  79 y.o. year old female presented to the emergency department for further evaluation and management of relatively 2 weeks duration of knee pain that patient did receive local injection for the last 1 basically was the day before presentation overnight the patient did have severe intolerable pain she could not actually stand of the commode with weakness she came to the emergency room where clinical and laboratory assessment did lead to admission with the diagnosis of hypertensive emergency with fluid volume overload     Patient did have a creatinine of 1.2 and GFR of 45 should be considered as baseline     at admission creatinine was 1.6 and GFR was 32     at the time of the consult creatinine continue to rise and GFR continue to drop to 1.9 and 26 respectively     no associated electrolyte or acid-base imbalance with     urine sediment can be considered as UTI type  be secondary to UTI none nephritic    Prior to admission patient was on glipizide Jardiance 10 mg and valsartan 160 mg in addition to telmisartan 80 mg     Patient is also diabetic type II patient hypertensive with osteoarthritis and from my discussion she may be taking nonsteroidal anti-inflammatory     Ultrasound of lower extremity no evidence of DVT      Acute kidney injury most probably secondary to accelerated hypertension in the clinical setting of hypertensive urgency/emergency not associated with major electrolyte or acid-base imbalance with bilateral lower extremity edema and relatively diminished air entry bibasilar and lung exam possible element of vasomotor nephropathy if patient was combining NSAIDs and ARB     Chronic kidney disease secondary to most likely diabetic nephropathy plus or minus atherosclerotic renovascular disease hypertensive nephrosclerosis renal ultrasound giving the patient kidney size    No hydronephrosis no nephrolithiasis no suspicious cysts with right kidney measuring 9.1 and left  kidney 9.5    Patient does have 6.4 g proteinuria     with normal calcium and phosphorus product and normal TSH     Plan/  Yesterday we did resume ARB  (still on hold ) we hold the Jardiance cannot contribute to observe the rise in creatinine and drop in GFR to the ARB so please we will resume ARB for better blood pressure control as the current working diagnosis is acute kidney injury secondary to accelerated hypertension in patient with chronic kidney disease and profound proteinuria we need to start full dose of valsartan 320 mg daily and observe for the next 24 hours eventually when UTI subsides patient needs to be on SGLT2 inhibitor  No need for loop diuretic at this point of time      outpatient follow up from renal standpoint: Dr. Hunt      Subjective:   Admit Date: 4/8/2025    Interval History: Patient seen and examined uneventful night no uremic related or fluid volume overload related symptoms discussed with attending team patient diuretic ARB and discussed with the patient based on laboratory assessment patient will benefit from staying another day to determine her home diuretic.  Discussed with the patient kidney preservation fluid balance on an outpatient basis and daily weight low-salt and nonsteroidal anti-inflammatory not to take      Medications:   Scheduled Meds:aspirin, 81 mg, oral, Nightly  carvedilol, 6.25 mg, oral, BID  cefTRIAXone, 1 g, intravenous, q24h  [Held by provider] empagliflozin, 10 mg, oral, Daily  [START ON 4/12/2025] furosemide, 20 mg, intravenous, q24h  heparin (porcine), 5,000 Units, subcutaneous, q8h  insulin lispro, 0-5 Units, subcutaneous, TID AC  simvastatin, 80 mg, oral, Nightly  [Held by provider] valsartan, 320 mg, oral, Daily      Continuous Infusions:     CBC:   Lab Results   Component Value Date    HGB 10.7 (L) 04/11/2025    HGB 10.9 (L) 04/10/2025    WBC 8.2 04/11/2025    WBC 8.2 04/10/2025     04/11/2025     04/10/2025      Anemia:  No results  "found for: \"FERRITIN\", \"IRON\", \"TIBC\"   BMP:    Lab Results   Component Value Date     04/11/2025     04/10/2025    K 4.4 04/11/2025    K 4.4 04/10/2025     04/11/2025     04/10/2025    CO2 28 04/11/2025    CO2 28 04/10/2025    BUN 52 (H) 04/11/2025    BUN 40 (H) 04/10/2025    CREATININE 2.11 (H) 04/11/2025    CREATININE 1.92 (H) 04/10/2025      Bone disease:   Lab Results   Component Value Date    .2 (H) 04/10/2025      Urinalysis:    Lab Results   Component Value Date    SANIA 7.0 04/10/2025    PROTUR 10 (TRACE) 04/10/2025    GLUCOSEU 500 (3+) (A) 04/10/2025    BLOODU 0.03 (TRACE) (A) 04/10/2025    KETONESU NEGATIVE 04/10/2025    BILIRUBINU NEGATIVE 04/10/2025    NITRITEU NEGATIVE 04/10/2025    LEUKOCYTESU 500 Tanja/uL (A) 04/10/2025        Objective:   Vitals: /63 (BP Location: Left arm, Patient Position: Lying)   Pulse 68   Temp 36.5 °C (97.7 °F) (Temporal)   Resp 16   Ht 1.727 m (5' 8\")   Wt 105 kg (231 lb 7.7 oz)   SpO2 95%   BMI 35.20 kg/m²    Wt Readings from Last 3 Encounters:   04/09/25 105 kg (231 lb 7.7 oz)   04/04/25 97.5 kg (215 lb)   03/24/25 99.5 kg (219 lb 6.4 oz)      24HR INTAKE/OUTPUT:    Intake/Output Summary (Last 24 hours) at 4/11/2025 1226  Last data filed at 4/10/2025 1556  Gross per 24 hour   Intake 50 ml   Output --   Net 50 ml     Admission weight:  Weight: 97.5 kg (215 lb)      Constitutional:  Alert, awake, no apparent distress   Skin:normal, no rash  HEENT:sclera anicteric.  Head atraumatic normocephalic  Neck:supple with no thyromegally  Cardiovascular:  S1, S2 without m/r/g   Respiratory:  CTA B without w/r/r   Abdomen: +bs, soft, nt  Ext: no LE edema  Musculoskeletal:Intact  Neuro:Alert and oriented with no deficit      Electronically signed by Kelsey Hunt MD on 4/11/2025 at 12:26 PM            "

## 2025-04-12 ENCOUNTER — HOME HEALTH ADMISSION (OUTPATIENT)
Dept: HOME HEALTH SERVICES | Facility: HOME HEALTH | Age: 80
End: 2025-04-12
Payer: COMMERCIAL

## 2025-04-12 ENCOUNTER — DOCUMENTATION (OUTPATIENT)
Dept: HOME HEALTH SERVICES | Facility: HOME HEALTH | Age: 80
End: 2025-04-12
Payer: COMMERCIAL

## 2025-04-12 VITALS
DIASTOLIC BLOOD PRESSURE: 63 MMHG | HEIGHT: 68 IN | RESPIRATION RATE: 18 BRPM | TEMPERATURE: 97 F | HEART RATE: 60 BPM | OXYGEN SATURATION: 98 % | SYSTOLIC BLOOD PRESSURE: 137 MMHG | WEIGHT: 231.48 LBS | BODY MASS INDEX: 35.08 KG/M2

## 2025-04-12 LAB
ANION GAP SERPL CALC-SCNC: 13 MMOL/L (ref 10–20)
BUN SERPL-MCNC: 56 MG/DL (ref 6–23)
CALCIUM SERPL-MCNC: 8.5 MG/DL (ref 8.6–10.3)
CHLORIDE SERPL-SCNC: 103 MMOL/L (ref 98–107)
CO2 SERPL-SCNC: 27 MMOL/L (ref 21–32)
CREAT SERPL-MCNC: 2.16 MG/DL (ref 0.5–1.05)
EGFRCR SERPLBLD CKD-EPI 2021: 23 ML/MIN/1.73M*2
ERYTHROCYTE [DISTWIDTH] IN BLOOD BY AUTOMATED COUNT: 13.3 % (ref 11.5–14.5)
GLUCOSE BLD MANUAL STRIP-MCNC: 115 MG/DL (ref 74–99)
GLUCOSE BLD MANUAL STRIP-MCNC: 158 MG/DL (ref 74–99)
GLUCOSE BLD MANUAL STRIP-MCNC: 219 MG/DL (ref 74–99)
GLUCOSE SERPL-MCNC: 166 MG/DL (ref 74–99)
HCT VFR BLD AUTO: 32.5 % (ref 36–46)
HGB BLD-MCNC: 10.6 G/DL (ref 12–16)
MCH RBC QN AUTO: 30.6 PG (ref 26–34)
MCHC RBC AUTO-ENTMCNC: 32.6 G/DL (ref 32–36)
MCV RBC AUTO: 94 FL (ref 80–100)
NRBC BLD-RTO: 0 /100 WBCS (ref 0–0)
PLATELET # BLD AUTO: 228 X10*3/UL (ref 150–450)
POTASSIUM SERPL-SCNC: 4.5 MMOL/L (ref 3.5–5.3)
RBC # BLD AUTO: 3.46 X10*6/UL (ref 4–5.2)
SODIUM SERPL-SCNC: 138 MMOL/L (ref 136–145)
WBC # BLD AUTO: 6.7 X10*3/UL (ref 4.4–11.3)

## 2025-04-12 PROCEDURE — 2500000004 HC RX 250 GENERAL PHARMACY W/ HCPCS (ALT 636 FOR OP/ED): Performed by: NURSE PRACTITIONER

## 2025-04-12 PROCEDURE — 2500000001 HC RX 250 WO HCPCS SELF ADMINISTERED DRUGS (ALT 637 FOR MEDICARE OP): Performed by: NURSE PRACTITIONER

## 2025-04-12 PROCEDURE — 82947 ASSAY GLUCOSE BLOOD QUANT: CPT

## 2025-04-12 PROCEDURE — 36415 COLL VENOUS BLD VENIPUNCTURE: CPT | Performed by: REGISTERED NURSE

## 2025-04-12 PROCEDURE — 80048 BASIC METABOLIC PNL TOTAL CA: CPT | Performed by: REGISTERED NURSE

## 2025-04-12 PROCEDURE — 2500000002 HC RX 250 W HCPCS SELF ADMINISTERED DRUGS (ALT 637 FOR MEDICARE OP, ALT 636 FOR OP/ED): Performed by: INTERNAL MEDICINE

## 2025-04-12 PROCEDURE — 85027 COMPLETE CBC AUTOMATED: CPT | Performed by: REGISTERED NURSE

## 2025-04-12 PROCEDURE — 2500000002 HC RX 250 W HCPCS SELF ADMINISTERED DRUGS (ALT 637 FOR MEDICARE OP, ALT 636 FOR OP/ED): Performed by: STUDENT IN AN ORGANIZED HEALTH CARE EDUCATION/TRAINING PROGRAM

## 2025-04-12 PROCEDURE — 99239 HOSP IP/OBS DSCHRG MGMT >30: CPT | Performed by: REGISTERED NURSE

## 2025-04-12 PROCEDURE — 97116 GAIT TRAINING THERAPY: CPT | Mod: GP,CQ | Performed by: PHYSICAL THERAPY ASSISTANT

## 2025-04-12 PROCEDURE — 2500000001 HC RX 250 WO HCPCS SELF ADMINISTERED DRUGS (ALT 637 FOR MEDICARE OP): Performed by: INTERNAL MEDICINE

## 2025-04-12 PROCEDURE — 2500000004 HC RX 250 GENERAL PHARMACY W/ HCPCS (ALT 636 FOR OP/ED): Performed by: REGISTERED NURSE

## 2025-04-12 RX ORDER — TORSEMIDE 20 MG/1
10 TABLET ORAL DAILY
Status: DISCONTINUED | OUTPATIENT
Start: 2025-04-12 | End: 2025-04-12 | Stop reason: HOSPADM

## 2025-04-12 RX ORDER — CARVEDILOL 12.5 MG/1
12.5 TABLET ORAL 2 TIMES DAILY
Qty: 60 TABLET | Refills: 0 | Status: SHIPPED | OUTPATIENT
Start: 2025-04-12 | End: 2025-05-12

## 2025-04-12 RX ORDER — CARVEDILOL 12.5 MG/1
12.5 TABLET ORAL 2 TIMES DAILY
Status: DISCONTINUED | OUTPATIENT
Start: 2025-04-12 | End: 2025-04-12 | Stop reason: HOSPADM

## 2025-04-12 RX ORDER — TORSEMIDE 10 MG/1
10 TABLET ORAL DAILY
Qty: 30 TABLET | Refills: 0 | Status: SHIPPED | OUTPATIENT
Start: 2025-04-12 | End: 2025-05-12

## 2025-04-12 RX ADMIN — TORSEMIDE 10 MG: 20 TABLET ORAL at 17:50

## 2025-04-12 RX ADMIN — ACETAMINOPHEN 650 MG: 325 TABLET ORAL at 08:06

## 2025-04-12 RX ADMIN — INSULIN LISPRO 3 UNITS: 100 INJECTION, SOLUTION INTRAVENOUS; SUBCUTANEOUS at 11:34

## 2025-04-12 RX ADMIN — HEPARIN SODIUM 5000 UNITS: 5000 INJECTION INTRAVENOUS; SUBCUTANEOUS at 15:53

## 2025-04-12 RX ADMIN — INSULIN LISPRO 1 UNITS: 100 INJECTION, SOLUTION INTRAVENOUS; SUBCUTANEOUS at 06:50

## 2025-04-12 RX ADMIN — CEFTRIAXONE SODIUM 1 G: 1 INJECTION, SOLUTION INTRAVENOUS at 15:53

## 2025-04-12 RX ADMIN — HEPARIN SODIUM 5000 UNITS: 5000 INJECTION INTRAVENOUS; SUBCUTANEOUS at 06:28

## 2025-04-12 RX ADMIN — FUROSEMIDE 20 MG: 10 INJECTION, SOLUTION INTRAMUSCULAR; INTRAVENOUS at 08:05

## 2025-04-12 RX ADMIN — VALSARTAN 320 MG: 160 TABLET, FILM COATED ORAL at 08:05

## 2025-04-12 RX ADMIN — HEPARIN SODIUM 5000 UNITS: 5000 INJECTION INTRAVENOUS; SUBCUTANEOUS at 00:16

## 2025-04-12 RX ADMIN — CARVEDILOL 6.25 MG: 6.25 TABLET, FILM COATED ORAL at 08:05

## 2025-04-12 ASSESSMENT — COGNITIVE AND FUNCTIONAL STATUS - GENERAL
MOBILITY SCORE: 20
STANDING UP FROM CHAIR USING ARMS: A LITTLE
CLIMB 3 TO 5 STEPS WITH RAILING: A LITTLE
MOVING TO AND FROM BED TO CHAIR: A LITTLE
WALKING IN HOSPITAL ROOM: A LITTLE

## 2025-04-12 ASSESSMENT — PAIN SCALES - WONG BAKER: WONGBAKER_NUMERICALRESPONSE: HURTS LITTLE MORE

## 2025-04-12 ASSESSMENT — PAIN SCALES - GENERAL
PAINLEVEL_OUTOF10: 2
PAINLEVEL_OUTOF10: 6
PAINLEVEL_OUTOF10: 0 - NO PAIN

## 2025-04-12 ASSESSMENT — PAIN DESCRIPTION - LOCATION: LOCATION: GENERALIZED

## 2025-04-12 ASSESSMENT — PAIN DESCRIPTION - DESCRIPTORS: DESCRIPTORS: ACHING

## 2025-04-12 ASSESSMENT — PAIN - FUNCTIONAL ASSESSMENT
PAIN_FUNCTIONAL_ASSESSMENT: 0-10
PAIN_FUNCTIONAL_ASSESSMENT: 0-10

## 2025-04-12 NOTE — DISCHARGE SUMMARY
Discharge Diagnosis  Ambulatory dysfunction  Chronic Knee Pain   Acute on chronic diastolic congestive heart failure  Hypertension  Diabetes type 2  CKD stage IIIb  History of DVT    Issues Requiring Follow-Up  BMP 1 week  Follow-up with cardiology for an outpatient Lexiscan  Follow-up with nephrology 1 week  Follow-up with PCP in 1 week    Discharge Meds     Medication List      START taking these medications     carvedilol 12.5 mg tablet; Commonly known as: Coreg; Take 1 tablet (12.5   mg) by mouth 2 times a day.   torsemide 10 mg tablet; Commonly known as: Demadex; Take 1 tablet (10   mg) by mouth once daily.   valsartan 320 mg tablet; Commonly known as: Diovan; Take 1 tablet (320   mg) by mouth once daily.     CONTINUE taking these medications     alendronate 70 mg tablet; Commonly known as: Fosamax; Take 1 tablet (70   mg) by mouth every 7 days. Take in the morning with a full glass of water,   on an empty stomach, and do not take anything else by mouth or lie down   for the next 30 min.   aspirin 81 mg EC tablet   ergocalciferol 1250 mcg (50,000 units) capsule; Commonly known as:   Vitamin D-2; Take 1 capsule (1,250 mcg) by mouth every 7 days.   glipiZIDE XL 10 mg 24 hr tablet; Commonly known as: Glucotrol XL; Take 1   tablet (10 mg) by mouth once daily.   simvastatin 80 mg tablet; Commonly known as: Zocor; Take 1 tablet (80   mg) by mouth once daily at bedtime.     STOP taking these medications     acetaminophen 500 mg tablet; Commonly known as: Tylenol   diclofenac sodium 1 % gel; Commonly known as: Voltaren   empagliflozin 10 mg tablet; Commonly known as: Jardiance   telmisartan 80 mg tablet; Commonly known as: MIcarDIS       Test Results Pending At Discharge  Pending Labs       Order Current Status    Urine Culture Preliminary result            Hospital Course   Sarah Manzo is a 79 y.o. female with a past medical history of HTN, T2DM, and b/l knee OA who presented to the ED with ambulatory dysfunction.  The patient reports a 2 week history of knee pain that she has been receiving injections for, with her last injection yesterday. She reports the pain in her knees became worse overnight, and when she sat down on the commode today, she was unable to stand back up due to pain and weakness in her knees.     She was admitted for Acute on Chronic CHF, chronic knee pain, lower extremity edema and impaired mobility.  She was evaluated by PT OT and orthopedics due to recent steroid injections to bilateral knees.  X-rays were obtained with no acute findings.  DEEP mostly probably secondary to accelerated hypertension recommended therapy and pain management.  Weightbearing as tolerated.      She was evaluated by cardiology nephrology.  Echo showed LVEF of 50%.  She was diuresed.  She did experience DEEP on CKD.  She also experienced a UTI and was treated with IV Rocephin x 3 days.,  Hypertensive urgency, lower extremity edema and possible vasomotor nephropathy combining NSAIDs and ARB.  Likely diabetic neuropathy and hypertensive nephrosclerosis due to renal ultrasound and kidney size.    Symptoms improved throughout, hospital course.  Outpatient Lexiscan nuclear stress test planned.  Agree with SGLT2 inhibitor such as Jardiance, however due to UTI will hold for now and reevaluate resuming in outpatient setting.  Discontinue amlodipine with lower extremity swelling.  Okay to discharge from cardiology standpoint.    Nephrology okay to discharge with Coreg 12.5 mg daily, valsartan 320 mg daily, torsemide 10 mg daily, BMP in 1 week.  Hold Jardiance until seen by outpatient settings.  Received 2 doses of Rocephin IV and therefore will not provide p.o. medications outpatient.  Will need to follow-up with PCP for sensitivity of cultures.    Patient strict follow-up for Malathi stress test  Strict follow-up with cardiology  Strict follow-up with BMP in 1 week and follow-up with nephrology    Patient wishes to discharge home with healthy  at home and home health care.  Voices no concerns at this time.    Pt to discharge in stable condition   Vital signs stable   Glucose 166, sodium 138, potassium 4.5, creatinine 2.16, EGFR 23, WBC 6.7, hemoglobin 10.6 and hematocrit 32.5.  36.1, pulse 60, resp is 18, /63, SpO2 98 on room air    Time spent  40  minutes obtaining labs, imaging, recommendations, interview, assessment, examination, medication review/ordering, and EMR review.    Plan of care was discussed extensively with patient and RN. Patient verbalized understanding through teach back method. All questions and concerns addressed upon examination.     Of note, this documentation is completed using the Dragon Dictation system (voice recognition software). There may be spelling and/or grammatical errors that were not corrected prior to final submission.        Pertinent Physical Exam At Time of Discharge  Physical Exam  Constitutional: Well developed, awake/alert/oriented x3, , cooperative  Respiratory/Thorax: Patent airways,  normal breath sounds Cardiovascular: Regular, rate and rhythm, no murmurs, 2+ equal pulses of the extremities, normal S 1and S 2 + 1 pitting edema in lower extremities with anasarca to knees bilaterally  Musculoskeletal: ROM intact, no joint swelling, no swelling of joints, knees are not erythemic. Swelling, but consistent to pedals, no redness noted + trace  edema  improving   Extremities: normal extremities,  no contusions or wounds seen   Skin: warm, dry, intact  Neurological: alert/oriented x 3, speech clear,   Psychiatric: appropriate mood and behavior    Outpatient Follow-Up  Future Appointments   Date Time Provider Department Center   4/21/2025  2:00 PM Cole C Budinsky, MD [id number]   5/16/2025 11:45 AM Ernie Barrett MD [id number]   9/26/2025  1:40 PM Jessica Fernandes DO [id number]   3/26/2026  1:40 PM DO [id number]         Sheree Darnell APRN-CNP

## 2025-04-12 NOTE — PROGRESS NOTES
04/12/25 0956   Discharge Planning   Expected Discharge Disposition Home H   Patient Choice   Provider Choice list and CMS website (https://medicare.gov/care-compare#search) for post-acute Quality and Resource Measure Data were provided and reviewed with: Patient   Intensity of Service   Intensity of Service 0-30 min     Chart reviewed, patient doing better in therapy. Writer spoke with patient and discussed discharge. She reports feels comfortable going home att current level of functioning. She is agreeable to HHC/ await preference. She reports that she is waiting on renal clearance possible today and tomorrow. KLEVER Allen    Update 1447 patient being discharged today. Writer [facility] Notified attending for [facility] orders. KLEVER Allen

## 2025-04-12 NOTE — CARE PLAN
The patient's goals for the shift include Get some sleep    The clinical goals for the shift include patient will be able to walk with out any issues by discharge    Over the shift, the patient did not make progress toward the following goals. Barriers to progression include SOB. Recommendations to address these barriers include taking breaks when needed during her walks  Problem: Diabetes  Goal: Achieve decreasing blood glucose levels by end of shift  Outcome: Progressing  Goal: Increase stability of blood glucose readings by end of shift  Outcome: Progressing  Goal: Decrease in ketones present in urine by end of shift  Outcome: Progressing  Goal: Maintain electrolyte levels within acceptable range throughout shift  Outcome: Progressing  Goal: Maintain glucose levels >70mg/dl to <250mg/dl throughout shift  Outcome: Progressing  Goal: No changes in neurological exam by end of shift  Outcome: Progressing  Goal: Learn about and adhere to nutrition recommendations by end of shift  Outcome: Progressing  Goal: Vital signs within normal range for age by end of shift  Outcome: Progressing  Goal: Increase self care and/or family involovement by end of shift  Outcome: Progressing  Goal: Receive DSME education by end of shift  Outcome: Progressing     Problem: Pain - Adult  Goal: Verbalizes/displays adequate comfort level or baseline comfort level  Outcome: Progressing     Problem: Safety - Adult  Goal: Free from fall injury  Outcome: Progressing     Problem: Discharge Planning  Goal: Discharge to home or other facility with appropriate resources  Outcome: Progressing     Problem: Chronic Conditions and Co-morbidities  Goal: Patient's chronic conditions and co-morbidity symptoms are monitored and maintained or improved  Outcome: Progressing     Problem: Pain  Goal: Takes deep breaths with improved pain control throughout the shift  Outcome: Progressing  Goal: Turns in bed with improved pain control throughout the  shift  Outcome: Progressing  Goal: Walks with improved pain control throughout the shift  Outcome: Progressing  Goal: Performs ADL's with improved pain control throughout shift  Outcome: Progressing  Goal: Participates in PT with improved pain control throughout the shift  Outcome: Progressing  Goal: Free from opioid side effects throughout the shift  Outcome: Progressing  Goal: Free from acute confusion related to pain meds throughout the shift  Outcome: Progressing     Problem: Skin  Goal: Decreased wound size/increased tissue granulation at next dressing change  Outcome: Progressing  Goal: Participates in plan/prevention/treatment measures  Outcome: Progressing  Goal: Prevent/manage excess moisture  Outcome: Progressing  Goal: Prevent/minimize sheer/friction injuries  Outcome: Progressing  Goal: Promote/optimize nutrition  Outcome: Progressing  Goal: Promote skin healing  Outcome: Progressing

## 2025-04-12 NOTE — HH CARE COORDINATION
Home Care received a Referral for Nursing, Physical Therapy, and Occupational Therapy. We have processed the referral for a Start of Care on 04/14/2025.     If you have any questions or concerns, please feel free to contact us at [phone]. Follow the prompts, enter your five digit zip code, and you will be directed to your care team on [facility].

## 2025-04-12 NOTE — PROGRESS NOTES
"Physical Therapy    Physical Therapy Treatment    Patient Name: Sarah Manzo  MRN: [id number]  Today's Date: 4/12/2025  Time Calculation  Start Time: 0131  Stop Time: 0203  Time Calculation (min): 32 min     [id number]    Assessment/Plan   PT Assessment  PT Assessment Results: Decreased strength, Decreased range of motion, Decreased endurance, Impaired balance, Decreased mobility, Decreased coordination, Pain  Rehab Prognosis: Good  Barriers to Discharge Home: Caregiver assistance, Physical needs  Caregiver Assistance: Caregiver assistance needed per identified barriers - however, level of patient's required assistance exceeds assistance available at home  Physical Needs: 24hr ADL assistance needed, 24hr mobility assistance needed  Evaluation/Treatment Tolerance: Patient tolerated treatment well  Medical Staff Made Aware: Yes  Strengths: Coping skills, Attitude of self  Barriers to Participation: Comorbidities  End of Session Communication: Bedside nurse  Assessment Comment: pt would benefit from continued therapy to improve functional mobility , strength and safety  End of Session Patient Position: Up in chair     PT Plan  Treatment/Interventions: Transfer training, Gait training, Balance training, Postural re-education  PT Plan: Ongoing PT  PT Frequency: 3 times per week  General Visit Information:   PT  Visit  PT Received On: 04/12/25  General  Prior to Session Communication: Bedside nurse  Patient Position Received: Up in chair  General Comment:  (Pt. stating \"I carry a staff\" for ambulation eventually but requesting a fww presently stating feels more stable presently with fww. States that she is moving better since her water weight has decreased.)    Precautions:  Precautions  Medical Precautions: Fall precautions  Precautions Comment:  (fall)    Treatments:  Ambulation/Gait Training  Ambulation/Gait Training Performed: Yes (gait training with fww performing 50' x 2, 100' x 1 recipricol stride with distance self " progressed and supervision to assure balance and form negotiating obstacles in room via sidestepping through tight space using fww as educated.)  Transfers  Transfer: Yes (sit to stand with supervision/sba stating has difficulty from low toilet in bathroom but her bathroom at home is higher. doing better with transfers since water weight down.)    Outcome Measures:     [facility] Basic Mobility  Turning from your back to your side while in a flat bed without using bedrails: None  Moving from lying on your back to sitting on the side of a flat bed without using bedrails: None  Moving to and from bed to chair (including a wheelchair): A little  Standing up from a chair using your arms (e.g. wheelchair or bedside chair): A little  To walk in hospital room: A little  Climbing 3-5 steps with railing: A little  Basic Mobility - Total Score: 20        Education Documentation  ADL Training, taught by Denton Romero PTA at 4/12/2025  3:07 PM.  Learner: Patient  Readiness: Acceptance  Method: Explanation, Demonstration, Teach-back  Response: Verbalizes Understanding, Demonstrated Understanding    Body Mechanics, taught by Denton Romero PTA at 4/12/2025  3:07 PM.  Learner: Patient  Readiness: Acceptance  Method: Explanation, Demonstration, Teach-back  Response: Verbalizes Understanding, Demonstrated Understanding    Mobility Training, taught by Denton Romero PTA at 4/12/2025  3:07 PM.  Learner: Patient  Readiness: Acceptance  Method: Explanation, Demonstration, Teach-back  Response: Verbalizes Understanding, Demonstrated Understanding    Education Comments  No comments found.       EDUCATION:  Outpatient Education  Individual(s) Educated: Patient  Education Provided: Body Mechanics, Posture  Patient Response to Education: Patient/Caregiver Verbalized Understanding of Information, Patient/Caregiver Performed Return Demonstration of Exercises/Activities  Education Comment:  (postural correction with fww use, education of safe use  of device negotiating turns and hand placement and positioning with transfers.)  Encounter Problems       Encounter Problems (Active)       PT Problem       Pt will demonstrate SUP with bed mobility to edge of bed.   (Progressing)       Start:  04/09/25    Expected End:  04/23/25            Pt will demonstrate Sup with sit to stand/chair transfers with FWW.   (Progressing)       Start:  04/09/25    Expected End:  04/23/25            Pt will ambulate 50 feet with FWW Sup .   (Progressing)       Start:  04/09/25    Expected End:  04/23/25            Pt to demo improved BLE strength by being able to complete supine/seated thera ex 2x20 BLEs with 4 or less rest breaks .   (Progressing)       Start:  04/09/25    Expected End:  04/23/25               Pain - Adult

## 2025-04-12 NOTE — PROGRESS NOTES
Renal Progress Note    Assessment/  79 y.o. year old female presented to the emergency department for further evaluation and management of relatively 2 weeks duration of knee pain that patient did receive local injection for the last 1 basically was the day before presentation overnight the patient did have severe intolerable pain she could not actually stand of the commode with weakness she came to the emergency room where clinical and laboratory assessment did lead to admission with the diagnosis of hypertensive emergency with fluid volume overload     Patient did have a creatinine of 1.2 and GFR of 45 should be considered as baseline      at admission creatinine was 1.6 and GFR was 32      at the time of the consult creatinine continue to rise and GFR continue to drop to 1.9 and 26 respectively      no associated electrolyte or acid-base imbalance with      urine sediment can be considered as UTI type  be secondary to UTI none nephritic     Prior to admission patient was on glipizide Jardiance 10 mg and valsartan 160 mg in addition to telmisartan 80 mg     Patient is also diabetic type II patient hypertensive with osteoarthritis and from my discussion she may be taking nonsteroidal anti-inflammatory     Ultrasound of lower extremity no evidence of DVT      Acute kidney injury most probably secondary to accelerated hypertension in the clinical setting of hypertensive urgency/emergency not associated with major electrolyte or acid-base imbalance with bilateral lower extremity edema and relatively diminished air entry bibasilar and lung exam possible element of vasomotor nephropathy if patient was combining NSAIDs and ARB     Chronic kidney disease secondary to most likely diabetic nephropathy plus or minus atherosclerotic renovascular disease hypertensive nephrosclerosis renal ultrasound giving the patient kidney size     No hydronephrosis no nephrolithiasis no suspicious cysts with right kidney measuring 9.1 and  "left kidney 9.5     Patient does have 6.4 g proteinuria      with normal calcium and phosphorus product and normal TSH     Plan/  Patient can be discharged from the kidney standpoint on the current medication  Torsemide 10 mg daily valsartan 325 mg daily and carvedilol increased to 12.5 mg twice daily patient to call [phone] for appointment and labs in 1 week    Subjective:   Admit Date: 4/8/2025    Interval History: Patient seen and examined uneventful night no uremic fluid volume overload related symptoms did discuss with the patient her current lab the effect of losartan and outpatient follow-up as well as discussed with attending service      Medications:   Scheduled Meds:aspirin, 81 mg, oral, Nightly  carvedilol, 6.25 mg, oral, BID  cefTRIAXone, 1 g, intravenous, q24h  [Held by provider] empagliflozin, 10 mg, oral, Daily  furosemide, 20 mg, intravenous, q24h  heparin (porcine), 5,000 Units, subcutaneous, q8h  insulin lispro, 0-5 Units, subcutaneous, TID AC  simvastatin, 80 mg, oral, Nightly  valsartan, 320 mg, oral, Daily      Continuous Infusions:     CBC:   Lab Results   Component Value Date    HGB 10.6 (L) 04/12/2025    HGB 10.7 (L) 04/11/2025    WBC 6.7 04/12/2025    WBC 8.2 04/11/2025     04/12/2025     04/11/2025      Anemia:  No results found for: \"FERRITIN\", \"IRON\", \"TIBC\"   BMP:    Lab Results   Component Value Date     04/12/2025     04/11/2025    K 4.5 04/12/2025    K 4.4 04/11/2025     04/12/2025     04/11/2025    CO2 27 04/12/2025    CO2 28 04/11/2025    BUN 56 (H) 04/12/2025    BUN 52 (H) 04/11/2025    CREATININE 2.16 (H) 04/12/2025    CREATININE 2.11 (H) 04/11/2025      Bone disease:   Lab Results   Component Value Date    .2 (H) 04/10/2025      Urinalysis:    Lab Results   Component Value Date    SANIA 7.0 04/10/2025    PROTUR 10 (TRACE) 04/10/2025    GLUCOSEU 500 (3+) (A) 04/10/2025    BLOODU 0.03 (TRACE) (A) 04/10/2025    KETONESU NEGATIVE " "04/10/2025    BILIRUBINU NEGATIVE 04/10/2025    NITRITEU NEGATIVE 04/10/2025    LEUKOCYTESU 500 Tanja/uL (A) 04/10/2025        Objective:   Vitals: /70 (Patient Position: Sitting)   Pulse 70   Temp 35.7 °C (96.3 °F)   Resp 18   Ht 1.727 m (5' 8\")   Wt 105 kg (231 lb 7.7 oz)   SpO2 96%   BMI 35.20 kg/m²    Wt Readings from Last 3 Encounters:   04/09/25 105 kg (231 lb 7.7 oz)   04/04/25 97.5 kg (215 lb)   03/24/25 99.5 kg (219 lb 6.4 oz)      24HR INTAKE/OUTPUT:  No intake or output data in the 24 hours ending 04/12/25 1444  Admission weight:  Weight: 97.5 kg (215 lb)      Constitutional:  Alert, awake, no apparent distress   Skin:normal, no rash  HEENT:sclera anicteric.  Head atraumatic normocephalic  Neck:supple with no thyromegally  Cardiovascular:  S1, S2 without m/r/g   Respiratory:  CTA B without w/r/r   Abdomen: +bs, soft, nt  Ext: no LE edema  Musculoskeletal:Intact  Neuro:Alert and oriented with no deficit      Electronically signed by Kelsey Hunt MD on 4/12/2025 at 2:44 PM            "

## 2025-04-12 NOTE — CARE PLAN
The patient's goals for the shift include Get some sleep    The clinical goals for the shift include Pt will exhibit increased ambulatory ability today for a home dc vs rehab.    Over the shift, the patient did not make progress toward the following goals.

## 2025-04-13 ENCOUNTER — APPOINTMENT (OUTPATIENT)
Dept: CARDIOLOGY | Facility: HOSPITAL | Age: 80
End: 2025-04-13
Payer: COMMERCIAL

## 2025-04-13 ENCOUNTER — APPOINTMENT (OUTPATIENT)
Dept: RADIOLOGY | Facility: HOSPITAL | Age: 80
End: 2025-04-13
Payer: COMMERCIAL

## 2025-04-13 ENCOUNTER — HOSPITAL ENCOUNTER (EMERGENCY)
Facility: HOSPITAL | Age: 80
Discharge: HOME | End: 2025-04-13
Attending: EMERGENCY MEDICINE
Payer: COMMERCIAL

## 2025-04-13 ENCOUNTER — PATIENT OUTREACH (OUTPATIENT)
Dept: CARE COORDINATION | Age: 80
End: 2025-04-13
Payer: COMMERCIAL

## 2025-04-13 VITALS
DIASTOLIC BLOOD PRESSURE: 89 MMHG | HEIGHT: 68 IN | OXYGEN SATURATION: 94 % | SYSTOLIC BLOOD PRESSURE: 186 MMHG | RESPIRATION RATE: 24 BRPM | BODY MASS INDEX: 31.98 KG/M2 | TEMPERATURE: 97.5 F | HEART RATE: 68 BPM | WEIGHT: 211 LBS

## 2025-04-13 DIAGNOSIS — E11.40 TYPE 2 DIABETES MELLITUS WITH DIABETIC NEUROPATHY, WITHOUT LONG-TERM CURRENT USE OF INSULIN: ICD-10-CM

## 2025-04-13 DIAGNOSIS — I10 PRIMARY HYPERTENSION: ICD-10-CM

## 2025-04-13 DIAGNOSIS — R55 NEAR SYNCOPE: ICD-10-CM

## 2025-04-13 DIAGNOSIS — R42 LIGHTHEADEDNESS: Primary | ICD-10-CM

## 2025-04-13 LAB
ALBUMIN SERPL BCP-MCNC: 3.5 G/DL (ref 3.4–5)
ALP SERPL-CCNC: 64 U/L (ref 33–136)
ALT SERPL W P-5'-P-CCNC: 11 U/L (ref 7–45)
ANION GAP SERPL CALC-SCNC: 15 MMOL/L (ref 10–20)
AST SERPL W P-5'-P-CCNC: 10 U/L (ref 9–39)
BACTERIA UR CULT: ABNORMAL
BASOPHILS # BLD AUTO: 0.05 X10*3/UL (ref 0–0.1)
BASOPHILS NFR BLD AUTO: 0.4 %
BILIRUB SERPL-MCNC: 0.3 MG/DL (ref 0–1.2)
BNP SERPL-MCNC: 96 PG/ML (ref 0–99)
BUN SERPL-MCNC: 60 MG/DL (ref 6–23)
CALCIUM SERPL-MCNC: 8.4 MG/DL (ref 8.6–10.3)
CARDIAC TROPONIN I PNL SERPL HS: 10 NG/L (ref 0–13)
CARDIAC TROPONIN I PNL SERPL HS: 13 NG/L (ref 0–13)
CHLORIDE SERPL-SCNC: 102 MMOL/L (ref 98–107)
CO2 SERPL-SCNC: 27 MMOL/L (ref 21–32)
CREAT SERPL-MCNC: 2.44 MG/DL (ref 0.5–1.05)
EGFRCR SERPLBLD CKD-EPI 2021: 20 ML/MIN/1.73M*2
EOSINOPHIL # BLD AUTO: 0.26 X10*3/UL (ref 0–0.4)
EOSINOPHIL NFR BLD AUTO: 2.3 %
ERYTHROCYTE [DISTWIDTH] IN BLOOD BY AUTOMATED COUNT: 13.2 % (ref 11.5–14.5)
GLUCOSE SERPL-MCNC: 162 MG/DL (ref 74–99)
HCT VFR BLD AUTO: 32.4 % (ref 36–46)
HGB BLD-MCNC: 10.3 G/DL (ref 12–16)
IMM GRANULOCYTES # BLD AUTO: 0.03 X10*3/UL (ref 0–0.5)
IMM GRANULOCYTES NFR BLD AUTO: 0.3 % (ref 0–0.9)
LYMPHOCYTES # BLD AUTO: 1.81 X10*3/UL (ref 0.8–3)
LYMPHOCYTES NFR BLD AUTO: 16.3 %
MAGNESIUM SERPL-MCNC: 2.65 MG/DL (ref 1.6–2.4)
MCH RBC QN AUTO: 30.2 PG (ref 26–34)
MCHC RBC AUTO-ENTMCNC: 31.8 G/DL (ref 32–36)
MCV RBC AUTO: 95 FL (ref 80–100)
MONOCYTES # BLD AUTO: 0.92 X10*3/UL (ref 0.05–0.8)
MONOCYTES NFR BLD AUTO: 8.3 %
NEUTROPHILS # BLD AUTO: 8.06 X10*3/UL (ref 1.6–5.5)
NEUTROPHILS NFR BLD AUTO: 72.4 %
NRBC BLD-RTO: 0 /100 WBCS (ref 0–0)
PLATELET # BLD AUTO: 252 X10*3/UL (ref 150–450)
POTASSIUM SERPL-SCNC: 4.6 MMOL/L (ref 3.5–5.3)
PROT SERPL-MCNC: 6.9 G/DL (ref 6.4–8.2)
RBC # BLD AUTO: 3.41 X10*6/UL (ref 4–5.2)
SODIUM SERPL-SCNC: 139 MMOL/L (ref 136–145)
WBC # BLD AUTO: 11.1 X10*3/UL (ref 4.4–11.3)

## 2025-04-13 PROCEDURE — 83735 ASSAY OF MAGNESIUM: CPT

## 2025-04-13 PROCEDURE — 71045 X-RAY EXAM CHEST 1 VIEW: CPT | Performed by: RADIOLOGY

## 2025-04-13 PROCEDURE — 99285 EMERGENCY DEPT VISIT HI MDM: CPT | Performed by: EMERGENCY MEDICINE

## 2025-04-13 PROCEDURE — 93005 ELECTROCARDIOGRAM TRACING: CPT

## 2025-04-13 PROCEDURE — 96360 HYDRATION IV INFUSION INIT: CPT

## 2025-04-13 PROCEDURE — 2500000004 HC RX 250 GENERAL PHARMACY W/ HCPCS (ALT 636 FOR OP/ED)

## 2025-04-13 PROCEDURE — 83880 ASSAY OF NATRIURETIC PEPTIDE: CPT

## 2025-04-13 PROCEDURE — 36415 COLL VENOUS BLD VENIPUNCTURE: CPT

## 2025-04-13 PROCEDURE — 84484 ASSAY OF TROPONIN QUANT: CPT

## 2025-04-13 PROCEDURE — 96361 HYDRATE IV INFUSION ADD-ON: CPT

## 2025-04-13 PROCEDURE — 80053 COMPREHEN METABOLIC PANEL: CPT

## 2025-04-13 PROCEDURE — 85025 COMPLETE CBC W/AUTO DIFF WBC: CPT

## 2025-04-13 PROCEDURE — 71045 X-RAY EXAM CHEST 1 VIEW: CPT

## 2025-04-13 PROCEDURE — 2500000005 HC RX 250 GENERAL PHARMACY W/O HCPCS: Performed by: EMERGENCY MEDICINE

## 2025-04-13 PROCEDURE — 99284 EMERGENCY DEPT VISIT MOD MDM: CPT | Performed by: EMERGENCY MEDICINE

## 2025-04-13 RX ADMIN — Medication 2 L/MIN: at 19:17

## 2025-04-13 RX ADMIN — Medication 2 L/MIN: at 18:38

## 2025-04-13 RX ADMIN — SODIUM CHLORIDE 500 ML: 9 INJECTION, SOLUTION INTRAVENOUS at 18:24

## 2025-04-13 SDOH — HEALTH STABILITY: MENTAL HEALTH
DO YOU FEEL STRESS - TENSE, RESTLESS, NERVOUS, OR ANXIOUS, OR UNABLE TO SLEEP AT NIGHT BECAUSE YOUR MIND IS TROUBLED ALL THE TIME - THESE DAYS?: RATHER MUCH

## 2025-04-13 SDOH — HEALTH STABILITY: MENTAL HEALTH: HOW MANY DRINKS CONTAINING ALCOHOL DO YOU HAVE ON A TYPICAL DAY WHEN YOU ARE DRINKING?: PATIENT DOES NOT DRINK

## 2025-04-13 SDOH — ECONOMIC STABILITY: HOUSING INSECURITY: AT ANY TIME IN THE PAST 12 MONTHS, WERE YOU HOMELESS OR LIVING IN A SHELTER (INCLUDING NOW)?: NO

## 2025-04-13 SDOH — SOCIAL STABILITY: SOCIAL INSECURITY: WITHIN THE LAST YEAR, HAVE YOU BEEN HUMILIATED OR EMOTIONALLY ABUSED IN OTHER WAYS BY YOUR PARTNER OR EX-PARTNER?: NO

## 2025-04-13 SDOH — HEALTH STABILITY: MENTAL HEALTH: HOW OFTEN DO YOU HAVE A DRINK CONTAINING ALCOHOL?: NEVER

## 2025-04-13 SDOH — SOCIAL STABILITY: SOCIAL INSECURITY: ARE YOU MARRIED, WIDOWED, DIVORCED, SEPARATED, NEVER MARRIED, OR LIVING WITH A PARTNER?: WIDOWED

## 2025-04-13 SDOH — ECONOMIC STABILITY: HOUSING INSECURITY: IN THE LAST 12 MONTHS, WAS THERE A TIME WHEN YOU WERE NOT ABLE TO PAY THE MORTGAGE OR RENT ON TIME?: YES

## 2025-04-13 SDOH — HEALTH STABILITY: PHYSICAL HEALTH: ON AVERAGE, HOW MANY MINUTES DO YOU ENGAGE IN EXERCISE AT THIS LEVEL?: 0 MIN

## 2025-04-13 SDOH — SOCIAL STABILITY: SOCIAL NETWORK: HOW OFTEN DO YOU ATTEND MEETINGS OF THE CLUBS OR ORGANIZATIONS YOU BELONG TO?: MORE THAN 4 TIMES PER YEAR

## 2025-04-13 SDOH — SOCIAL STABILITY: SOCIAL NETWORK
IN A TYPICAL WEEK, HOW MANY TIMES DO YOU TALK ON THE PHONE WITH FAMILY, FRIENDS, OR NEIGHBORS?: MORE THAN THREE TIMES A WEEK

## 2025-04-13 SDOH — SOCIAL STABILITY: SOCIAL NETWORK
DO YOU BELONG TO ANY CLUBS OR ORGANIZATIONS SUCH AS CHURCH GROUPS, UNIONS, FRATERNAL OR ATHLETIC GROUPS, OR SCHOOL GROUPS?: YES

## 2025-04-13 SDOH — SOCIAL STABILITY: SOCIAL INSECURITY: WITHIN THE LAST YEAR, HAVE YOU BEEN AFRAID OF YOUR PARTNER OR EX-PARTNER?: NO

## 2025-04-13 SDOH — ECONOMIC STABILITY: TRANSPORTATION INSECURITY: IN THE PAST 12 MONTHS, HAS LACK OF TRANSPORTATION KEPT YOU FROM MEDICAL APPOINTMENTS OR FROM GETTING MEDICATIONS?: YES

## 2025-04-13 SDOH — ECONOMIC STABILITY: INCOME INSECURITY: IN THE PAST 12 MONTHS HAS THE ELECTRIC, GAS, OIL, OR WATER COMPANY THREATENED TO SHUT OFF SERVICES IN YOUR HOME?: YES

## 2025-04-13 SDOH — ECONOMIC STABILITY: FOOD INSECURITY: WITHIN THE PAST 12 MONTHS, THE FOOD YOU BOUGHT JUST DIDN'T LAST AND YOU DIDN'T HAVE MONEY TO GET MORE.: SOMETIMES TRUE

## 2025-04-13 SDOH — SOCIAL STABILITY: SOCIAL NETWORK: HOW OFTEN DO YOU GET TOGETHER WITH FRIENDS OR RELATIVES?: MORE THAN THREE TIMES A WEEK

## 2025-04-13 SDOH — HEALTH STABILITY: PHYSICAL HEALTH
HOW OFTEN DO YOU NEED TO HAVE SOMEONE HELP YOU WHEN YOU READ INSTRUCTIONS, PAMPHLETS, OR OTHER WRITTEN MATERIAL FROM YOUR DOCTOR OR PHARMACY?: SOMETIMES

## 2025-04-13 SDOH — ECONOMIC STABILITY: FOOD INSECURITY
WITHIN THE PAST 12 MONTHS, YOU WORRIED THAT YOUR FOOD WOULD RUN OUT BEFORE YOU GOT THE MONEY TO BUY MORE.: SOMETIMES TRUE

## 2025-04-13 SDOH — HEALTH STABILITY: MENTAL HEALTH: HOW OFTEN DO YOU HAVE SIX OR MORE DRINKS ON ONE OCCASION?: NEVER

## 2025-04-13 SDOH — ECONOMIC STABILITY: FOOD INSECURITY: HOW HARD IS IT FOR YOU TO PAY FOR THE VERY BASICS LIKE FOOD, HOUSING, MEDICAL CARE, AND HEATING?: VERY HARD

## 2025-04-13 SDOH — ECONOMIC STABILITY: HOUSING INSECURITY: IN THE PAST 12 MONTHS, HOW MANY TIMES HAVE YOU MOVED WHERE YOU WERE LIVING?: 1

## 2025-04-13 SDOH — SOCIAL STABILITY: SOCIAL NETWORK: HOW OFTEN DO YOU ATTEND CHURCH OR RELIGIOUS SERVICES?: MORE THAN 4 TIMES PER YEAR

## 2025-04-13 SDOH — HEALTH STABILITY: PHYSICAL HEALTH: ON AVERAGE, HOW MANY DAYS PER WEEK DO YOU ENGAGE IN MODERATE TO STRENUOUS EXERCISE (LIKE A BRISK WALK)?: 0 DAYS

## 2025-04-13 ASSESSMENT — LIFESTYLE VARIABLES
AUDIT-C TOTAL SCORE: 0
SKIP TO QUESTIONS 9-10: 1

## 2025-04-13 ASSESSMENT — COLUMBIA-SUICIDE SEVERITY RATING SCALE - C-SSRS
2. HAVE YOU ACTUALLY HAD ANY THOUGHTS OF KILLING YOURSELF?: NO
6. HAVE YOU EVER DONE ANYTHING, STARTED TO DO ANYTHING, OR PREPARED TO DO ANYTHING TO END YOUR LIFE?: NO
1. IN THE PAST MONTH, HAVE YOU WISHED YOU WERE DEAD OR WISHED YOU COULD GO TO SLEEP AND NOT WAKE UP?: NO

## 2025-04-13 ASSESSMENT — PAIN DESCRIPTION - PROGRESSION: CLINICAL_PROGRESSION: NOT CHANGED

## 2025-04-13 ASSESSMENT — ACTIVITIES OF DAILY LIVING (ADL): LACK_OF_TRANSPORTATION: YES

## 2025-04-13 ASSESSMENT — PAIN SCALES - GENERAL
PAINLEVEL_OUTOF10: 0 - NO PAIN
PAINLEVEL_OUTOF10: 0 - NO PAIN

## 2025-04-13 ASSESSMENT — PAIN SCALES - WONG BAKER: WONGBAKER_NUMERICALRESPONSE: NO HURT

## 2025-04-13 ASSESSMENT — PAIN - FUNCTIONAL ASSESSMENT: PAIN_FUNCTIONAL_ASSESSMENT: 0-10

## 2025-04-13 NOTE — ED PROVIDER NOTES
EMERGENCY DEPARTMENT ENCOUNTER      Pt Name: Sarah Manzo  MRN: [id number]  Birthdate 1945  Date of evaluation: 4/13/2025    HISTORY OF PRESENT ILLNESS    Sarah Manzo is an 79 y.o. female with history including hypertension, hyperlipidemia, type 2 diabetes, CKD, anemia presenting to the emergency department for lightheadedness.  Patient states that she was recently discharged from the hospital and was started on new medications for her new diagnosis of CHF.  Patient took all 3 of these new medications today approximately an hour after taking her medications she felt lightheaded like she was about to pass out.  Patient had no chest pain with it she had a little shortness of breath and felt hot and sweaty during the episode which all resolved.  Patient denies any recent illnesses.  Patient has had increased urination secondary to her new diuretic.      PAST MEDICAL HISTORY     Past Medical History:   Diagnosis Date    Anesthesia of skin     Numbness and tingling    Personal history of diseases of the blood and blood-forming organs and certain disorders involving the immune mechanism     History of bleeding disorder    Personal history of malignant neoplasm, unspecified     History of malignant neoplasm    Personal history of other diseases of the circulatory system     History of hypertension    Personal history of other diseases of the musculoskeletal system and connective tissue     History of arthritis    Personal history of other endocrine, nutritional and metabolic disease     History of diabetes mellitus    Personal history of other specified conditions     History of balance disorder    Unspecified abnormalities of breathing     Breathing problem    Unspecified disorder of ear, unspecified ear     Ear, nose and throat disorder       SURGICAL HISTORY       Past Surgical History:   Procedure Laterality Date    OTHER SURGICAL HISTORY  07/20/2022    Cervical surgery    OTHER SURGICAL HISTORY  07/20/2022     Back surgery    OTHER SURGICAL HISTORY  07/20/2022    Knee arthroscopy       CURRENT MEDICATIONS       Previous Medications    ALENDRONATE (FOSAMAX) 70 MG TABLET    Take 1 tablet (70 mg) by mouth every 7 days. Take in the morning with a full glass of water, on an empty stomach, and do not take anything else by mouth or lie down for the next 30 min.    ASPIRIN 81 MG EC TABLET    Take 1 tablet (81 mg) by mouth once daily at bedtime.    CARVEDILOL (COREG) 12.5 MG TABLET    Take 1 tablet (12.5 mg) by mouth 2 times a day.    ERGOCALCIFEROL (VITAMIN D-2) 1.25 MG (62035 UT) CAPSULE    Take 1 capsule (1,250 mcg) by mouth every 7 days.    GLIPIZIDE XL (GLUCOTROL XL) 10 MG 24 HR TABLET    Take 1 tablet (10 mg) by mouth once daily.    SIMVASTATIN (ZOCOR) 80 MG TABLET    Take 1 tablet (80 mg) by mouth once daily at bedtime.    TORSEMIDE (DEMADEX) 10 MG TABLET    Take 1 tablet (10 mg) by mouth once daily.    VALSARTAN (DIOVAN) 320 MG TABLET    Take 1 tablet (320 mg) by mouth once daily.       ALLERGIES     Hydrocodone-acetaminophen    FAMILY HISTORY     No family history on file.     SOCIAL HISTORY       Social History     Socioeconomic History    Marital status:    Tobacco Use    Smoking status: Never    Smokeless tobacco: Never   Vaping Use    Vaping status: Never Used   Substance and Sexual Activity    Alcohol use: Never    Drug use: Yes     Types: Other     Comment: CBD gummies     Social Drivers of Health     Financial Resource Strain: High Risk (4/13/2025)    Overall Financial Resource Strain (CARDIA)     Difficulty of Paying Living Expenses: Very hard   Food Insecurity: Food Insecurity Present (4/13/2025)    Hunger Vital Sign     Worried About Running Out of Food in the Last Year: Sometimes true     Ran Out of Food in the Last Year: Sometimes true   Transportation Needs: Unmet Transportation Needs (4/13/2025)    PRAPARE - Transportation     Lack of Transportation (Medical): Yes     Lack of Transportation  (Non-Medical): Yes   Physical Activity: Inactive (4/13/2025)    Exercise Vital Sign     Days of Exercise per Week: 0 days     Minutes of Exercise per Session: 0 min   Stress: Stress Concern Present (4/13/2025)    [employer] - Occupational Stress Questionnaire     Feeling of Stress : Rather much   Social Connections: Moderately Integrated (4/13/2025)    Social Connection and Isolation Panel [NHANES]     Frequency of Communication with Friends and Family: More than three times a week     Frequency of Social Gatherings with Friends and Family: More than three times a week     Attends Restorationist Services: More than 4 times per year     Active Member of Clubs or Organizations: Yes     Attends Club or Organization Meetings: More than 4 times per year     Marital Status:    Intimate Partner Violence: Not At Risk (4/13/2025)    Humiliation, Afraid, Rape, and Kick questionnaire     Fear of Current or Ex-Partner: No     Emotionally Abused: No     Physically Abused: No     Sexually Abused: No   Housing Stability: High Risk (4/13/2025)    Housing Stability Vital Sign     Unable to Pay for Housing in the Last Year: Yes     Number of Times Moved in the Last Year: 1     Homeless in the Last Year: No       PHYSICAL EXAM       ED Triage Vitals   Temperature Heart Rate Respirations BP   04/13/25 1743 04/13/25 1735 04/13/25 1735 04/13/25 1735   36.4 °C (97.5 °F) 59 16 127/69      Pulse Ox Temp src Heart Rate Source Patient Position   04/13/25 1735 -- 04/13/25 1735 04/13/25 1735   95 %  Monitor Sitting      BP Location FiO2 (%)     04/13/25 1735 --     Right arm        Physical Exam  Vitals and nursing note reviewed.   Constitutional:       General: She is not in acute distress.     Appearance: She is well-developed. She is obese.   HENT:      Head: Normocephalic and atraumatic.   Eyes:      Conjunctiva/sclera: Conjunctivae normal.   Cardiovascular:      Rate and Rhythm: Normal rate and regular  rhythm.      Heart sounds: No murmur heard.  Pulmonary:      Effort: Pulmonary effort is normal. No respiratory distress.      Breath sounds: Normal breath sounds.   Abdominal:      Palpations: Abdomen is soft.      Tenderness: There is no abdominal tenderness.   Musculoskeletal:         General: No swelling.   Skin:     General: Skin is warm and dry.   Neurological:      General: No focal deficit present.      Mental Status: She is alert and oriented to person, place, and time.   Psychiatric:         Mood and Affect: Mood normal.          DIAGNOSTIC RESULTS     LABS:  Labs Reviewed - No data to display    All other labs were within normal range or not returned as of this dictation.    Imaging  No orders to display        Procedures  Procedures     EMERGENCY DEPARTMENT COURSE/MDM:   Medical Decision Making  Sarah Manzo is an 79 y.o. female with history including hypertension, hyperlipidemia, type 2 diabetes, CKD, anemia presenting to the emergency department for lightheadedness.  Patient is hemodynamically stable on arrival.  We did orthostats right away concern for possible overdiuresis secondary to her new medications versus hypotension secondary to her new medications.  Patient was orthostatic positive given half a liter of fluid.    Cardiac lab workup reviewed interpret independently.  Troponin negative x 2.  Magnesium normal.  No elevated BNP.  Patient's creatinine is slightly elevated at 2.44 which is consistent with overdiuresis.  Patient's chest x-ray shows no evidence of pulmonary edema.    Patient was able to ambulate without any difficulty not feeling lightheaded.  Blood pressure hemodynamically stable after giving half a liter of fluid.  Patient was instructed to split up her medications and not take them all at the same time to help prevent any episodes of hypotension.  Patient was also instructed to increase her p.o. water intake over the next few days until she can follow-up with her cardiologist in  regards to her diuresis medication.        ED Course as of 04/13/25 2138   Sun Apr 13, 2025   1748 The static blood pressure was positive dropped 30 points from sitting to standing systolic [SK]      ED Course User Index  [SK] Lucille Chandler DO         Diagnoses as of 04/13/25 2138   Lightheadedness   Near syncope        External records reviewed: recent inpatient, clinic, and prior ED notes  Labs and Diagnostic imaging independently reviewed/interpreted by me.    Patient plan, care, lab results and imaging were all discussed with attending.    ED Medications administered this visit:  Medications - No data to display  New Prescriptions from this visit:    New Prescriptions    No medications on file       (Please note that portions of this note were completed with a voice recognition program.  Efforts were made to edit the dictations but occasionally words are mis-transcribed.)     Lucille Chandler DO  Resident  04/15/25 1737       Lucille Chandler DO  Resident  04/15/25 1737

## 2025-04-13 NOTE — PROGRESS NOTES
The patient called in to ask what she could take for pain, after being discharged from hospital yesterday. She was told at discharge to stop taking Tylenol but was not given an alternative. She is not yet enrolled in the program, but was given a detailed description of the program and will enroll. She stated that she was not having much pain at the moment but wanted to know just in case. She had multiple questions about her medications which were answered. She was also advised to  her medications from the pharmacy today, rather than delay, and get started taking them as prescribed. She will be enrolled today, rather than tomorrow so that her concerns can be addressed. She was advised of the billing process to her insurance carriers for [facility] calls and accepted.    Discharge diagnosis:  Ambulatory dysfunction  Chronic Knee Pain   Acute on chronic diastolic congestive heart failure  Hypertension  Diabetes type 2  CKD stage IIIb  History of DVT    The patient is in need of Social Work regarding housing, food and utilities. She and her daughter were left homeless last fall - she was allowed to stay with a friend which was meant to be short term, but that has been extended to the point where she feels unwelcome. Her daughter had been living in her car but recently has moved in with a friend. The patient is responsible for sharing the costs of living such as rent, food and utilities, and is trying to support her daughter as well, on limited income. A referral was placed for CHW. She is undergoing a lot of stress due to uncertainty regarding funds and living situation. She is a very active woman, who is used to managing self-care efficiently but with recent medical crises she is in need of support.

## 2025-04-14 ENCOUNTER — HOME CARE VISIT (OUTPATIENT)
Dept: HOME HEALTH SERVICES | Facility: HOME HEALTH | Age: 80
End: 2025-04-14
Payer: COMMERCIAL

## 2025-04-14 ENCOUNTER — APPOINTMENT (OUTPATIENT)
Dept: CARE COORDINATION | Age: 80
End: 2025-04-14
Payer: COMMERCIAL

## 2025-04-14 ENCOUNTER — PATIENT OUTREACH (OUTPATIENT)
Dept: CARE COORDINATION | Age: 80
End: 2025-04-14

## 2025-04-14 LAB
ATRIAL RATE: 56 BPM
ATRIAL RATE: 60 BPM
P AXIS: 64 DEGREES
P AXIS: 70 DEGREES
P OFFSET: 175 MS
P OFFSET: 177 MS
P ONSET: 117 MS
P ONSET: 122 MS
PR INTERVAL: 198 MS
PR INTERVAL: 208 MS
Q ONSET: 221 MS
Q ONSET: 221 MS
QRS COUNT: 10 BEATS
QRS COUNT: 9 BEATS
QRS DURATION: 82 MS
QRS DURATION: 92 MS
QT INTERVAL: 456 MS
QT INTERVAL: 462 MS
QTC CALCULATION(BAZETT): 445 MS
QTC CALCULATION(BAZETT): 456 MS
QTC FREDERICIA: 451 MS
QTC FREDERICIA: 456 MS
R AXIS: 39 DEGREES
R AXIS: 48 DEGREES
T AXIS: 86 DEGREES
T AXIS: 90 DEGREES
T OFFSET: 449 MS
T OFFSET: 452 MS
VENTRICULAR RATE: 56 BPM
VENTRICULAR RATE: 60 BPM

## 2025-04-14 PROCEDURE — G0299 HHS/HOSPICE OF RN EA 15 MIN: HCPCS

## 2025-04-14 NOTE — DISCHARGE INSTRUCTIONS
Please call your cardiologist first thing in the morning and get their recommendations on your medications.  In the meantime continue your carvedilol as prescribed once in the morning and once at night.  You should continue your torsemide once in the morning.  Recommend taking the valsartan at night starting tomorrow unless otherwise instructed by your cardiologist.    If you have continued symptoms despite changing her medication timing worsening symptoms, chest pain or worsening shortness of breath.  Please return to the ED immediately for reevaluation.

## 2025-04-14 NOTE — PROGRESS NOTES
"Daily Call Note:  [facility] weekly visit w this RN, and Angelina NP  Call conducted via phone, pt prefers phone, has difficulty w video  Denies CP/ SOB/ edema  Does not have B/P cuff, secure chat to Medic to see if we have any available.  Had [facility] RN visit today , RN took VS  B/P 122/70- sitting  B/P 132/70- standing  Pain bilateral knee, and  left shoulder.  Pain is chronic.  Recently she states she was unable to stand.  This is no longer an issue  Pt was instructed no to take Tylenol in hospital, and no longer on Voltaren.  Per Cat ok to take Tylenol. Pt must  avoid all NSAID's, pt aware and in agreement.  Has scale at home, and will record daily  Pt has concerns w housing and utilities.  CHW referral placed.    Has dog and wants housing to accommodate dog  Was in ER yesterday due to lightheadedness.  Was taking meds all together, now she is spreading meds several hours apart.   this am, reports BGL is \" steady\"  Has Nephrology appt tomorrow  Had recent UTI, denies dysuria/ hematuria  All questions concerns addressed  Scheduled upcoming [facility] weekly visit    Pt Education:  POC  Barriers:   Topics for Daily Review:  B/P  Pt demonstrates clear understanding: Yes    Daily Weight:  212 lbs  There were no vitals filed for this visit.   Last 3 Weights:  Wt Readings from Last 7 Encounters:   04/13/25 95.7 kg (211 lb)   04/09/25 105 kg (231 lb 7.7 oz)   04/04/25 97.5 kg (215 lb)   03/24/25 99.5 kg (219 lb 6.4 oz)   02/11/25 101 kg (223 lb 2 oz)   07/20/22 95.3 kg (210 lb)       Masimo Device: No   Masimo Clinical Impression:     Virtual Visits--Scheduled (Most Recent Date at Top)  Follow up Appointments  Recent Visits  Date Type Provider Dept   03/24/25 Office Visit DO Iván Torres [facility]   Showing recent visits within past 30 days and meeting all other requirements  Future Appointments  No visits were found meeting these conditions.  Showing future appointments within next 90 days and meeting all " other requirements       Frequency of RN Calls & Virtual Visits per Team Agreement: Healthy at Home Frequency: Daily    Medication issues Addressed (what was done):     Follow up appointments scheduled by [facility] Staff:   Referrals made by [facility] staff:

## 2025-04-15 ENCOUNTER — PATIENT OUTREACH (OUTPATIENT)
Dept: CARE COORDINATION | Age: 80
End: 2025-04-15
Payer: COMMERCIAL

## 2025-04-15 ENCOUNTER — PATIENT OUTREACH (OUTPATIENT)
Dept: CARE COORDINATION | Facility: CLINIC | Age: 80
End: 2025-04-15
Payer: COMMERCIAL

## 2025-04-15 VITALS
RESPIRATION RATE: 20 BRPM | HEIGHT: 68 IN | TEMPERATURE: 98.2 F | DIASTOLIC BLOOD PRESSURE: 70 MMHG | WEIGHT: 212 LBS | HEART RATE: 60 BPM | SYSTOLIC BLOOD PRESSURE: 132 MMHG | BODY MASS INDEX: 32.13 KG/M2 | OXYGEN SATURATION: 99 %

## 2025-04-15 SDOH — ECONOMIC STABILITY: FOOD INSECURITY: MEALS PER DAY: 3

## 2025-04-15 ASSESSMENT — ENCOUNTER SYMPTOMS
DEPRESSION: 0
PAIN LOCATION - PAIN QUALITY: ACHING
FATIGUES EASILY: 1
APPETITE LEVEL: FAIR
FATIGUE: 1
DYSPNEA ON EXERTION: 1
PAIN LOCATION: GENERALIZED
LIMITED RANGE OF MOTION: 1
PAIN SEVERITY GOAL: 0/10
ARTHRALGIAS: 1
LOWEST PAIN SEVERITY IN PAST 24 HOURS: 3/10
PAIN: 1
DYSPNEA ACTIVITY LEVEL: AFTER AMBULATING 10 - 20 FT
PAIN LOCATION - RELIEVING FACTORS: MEDS,REST
PAIN LOCATION - EXACERBATING FACTORS: ARTHRITIS
PAIN LOCATION - PAIN FREQUENCY: FREQUENT
HYPERTENSION: 1
LOSS OF SENSATION IN FEET: 0
PAIN LOCATION - PAIN SEVERITY: 5/10
DIZZINESS: 1
HIGHEST PAIN SEVERITY IN PAST 24 HOURS: 7/10
SUBJECTIVE PAIN PROGRESSION: UNCHANGED
CHANGE IN APPETITE: UNCHANGED
LOWER EXTREMITY EDEMA: 1
PERSON REPORTING PAIN: PATIENT
SHORTNESS OF BREATH: 1
OCCASIONAL FEELINGS OF UNSTEADINESS: 1

## 2025-04-15 ASSESSMENT — PAIN SCALES - PAIN ASSESSMENT IN ADVANCED DEMENTIA (PAINAD)
BODYLANGUAGE: 0
BODYLANGUAGE: 0 - RELAXED.
FACIALEXPRESSION: 0
CONSOLABILITY: 0 - NO NEED TO CONSOLE.
CONSOLABILITY: 0
NEGVOCALIZATION: 0 - NONE.
BREATHING: 0
FACIALEXPRESSION: 0 - SMILING OR INEXPRESSIVE.
NEGVOCALIZATION: 0
TOTALSCORE: 0

## 2025-04-15 ASSESSMENT — ACTIVITIES OF DAILY LIVING (ADL)
AMBULATION ASSISTANCE: ONE PERSON
PHYSICAL TRANSFERS ASSESSED: 1
AMBULATION ASSISTANCE: 1
CURRENT_FUNCTION: ONE PERSON
SHOPPING: NEEDS ASSISTANCE
OASIS_M1830: 03
SHOPPING ASSESSED: 1
TRANSPORTATION ASSESSED: 1
TRANSPORTATION: NEEDS ASSISTANCE
BATHING_CURRENT_FUNCTION: MODERATE ASSIST
FEEDING: MINIMUM ASSIST
DRESSING_UB_CURRENT_FUNCTION: STAND BY ASSIST
BATHING ASSESSED: 1
ENTERING_EXITING_HOME: MODERATE ASSIST
FEEDING ASSESSED: 1
DRESSING_LB_CURRENT_FUNCTION: STAND BY ASSIST

## 2025-04-15 NOTE — PROGRESS NOTES
Call went to voice mail left a message to give a call back to [phone] Emailed resource to patient email address:    [id number] [place] [address]   [phone] Ext. [phone] (Utilities, Rental Assistance)    [facility]  [address]  [place] [address]  [phone]    [employer]   [address] [place] [address]  [phone] (Fri. 1:30P-3:30P)    [facility]  [address] [place] [address]  [phone] (M-F 10A-2P Food [employer])

## 2025-04-15 NOTE — ED PROVIDER NOTES
HPI   Chief Complaint   Patient presents with   • Dizziness       HPI        Patient History   Past Medical History:   Diagnosis Date   • Anesthesia of skin     Numbness and tingling   • Personal history of diseases of the blood and blood-forming organs and certain disorders involving the immune mechanism     History of bleeding disorder   • Personal history of malignant neoplasm, unspecified     History of malignant neoplasm   • Personal history of other diseases of the circulatory system     History of hypertension   • Personal history of other diseases of the musculoskeletal system and connective tissue     History of arthritis   • Personal history of other endocrine, nutritional and metabolic disease     History of diabetes mellitus   • Personal history of other specified conditions     History of balance disorder   • Unspecified abnormalities of breathing     Breathing problem   • Unspecified disorder of ear, unspecified ear     Ear, nose and throat disorder     Past Surgical History:   Procedure Laterality Date   • OTHER SURGICAL HISTORY  07/20/2022    Cervical surgery   • OTHER SURGICAL HISTORY  07/20/2022    Back surgery   • OTHER SURGICAL HISTORY  07/20/2022    Knee arthroscopy     No family history on file.  Social History     Tobacco Use   • Smoking status: Never   • Smokeless tobacco: Never   Vaping Use   • Vaping status: Never Used   Substance Use Topics   • Alcohol use: Never   • Drug use: Yes     Types: Other     Comment: CBD gummies       Physical Exam   ED Triage Vitals   Temperature Heart Rate Respirations BP   04/13/25 1743 04/13/25 1735 04/13/25 1735 04/13/25 1735   36.4 °C (97.5 °F) 59 16 127/69      Pulse Ox Temp src Heart Rate Source Patient Position   04/13/25 1735 -- 04/13/25 1735 04/13/25 1735   95 %  Monitor Sitting      BP Location FiO2 (%)     04/13/25 1735 --     Right arm        Physical Exam      ED Course & [facility]   ED Course as of 04/15/25 1906   Sun Apr 13, 2025   1748 The static  blood pressure was positive dropped 30 points from sitting to standing systolic [SK]      ED Course User Index  [SK] Lucille ROBBINS Ramesh, DO         Diagnoses as of 04/15/25 1906   Lightheadedness   Near syncope                 No data recorded     [place] Coma Scale Score: 15 (04/13/25 1749 : Lorena Grigsby RN)                           Medical Decision Making      Procedure  Procedures

## 2025-04-15 NOTE — PROGRESS NOTES
Daily Call Note:   Attempt daily call. Phone rang with no answer.lvm    Daily Weight:  There were no vitals filed for this visit.   Last 3 Weights:  Wt Readings from Last 7 Encounters:   04/13/25 95.7 kg (211 lb)   04/09/25 105 kg (231 lb 7.7 oz)   04/14/25 96.2 kg (212 lb)   04/04/25 97.5 kg (215 lb)   03/24/25 99.5 kg (219 lb 6.4 oz)   02/11/25 101 kg (223 lb 2 oz)   07/20/22 95.3 kg (210 lb)       Follow up Appointments  Recent Visits  Date Type Provider Dept   [facility]   Showing recent visits within past 30 days and meeting all other requirements  Future Appointments  No visits were found meeting these conditions.  Showing future appointments within next 90 days and meeting all other requirements       There were no vitals filed for this visit.   Last 3 Weights:  Wt Readings from Last 7 Encounters:   04/13/25 95.7 kg (211 lb)   04/09/25 105 kg (231 lb 7.7 oz)   04/14/25 96.2 kg (212 lb)   04/04/25 97.5 kg (215 lb)   03/24/25 99.5 kg (219 lb 6.4 oz)   02/11/25 101 kg (223 lb 2 oz)   07/20/22 95.3 kg (210 lb)         Virtual Visits--Scheduled (Most Recent Date at Top)  Follow up Appointments  Recent Visits  Date Type Provider Dept   03/24/25 Office Visit DO Katina [facility]1   Showing recent visits within past 30 days and meeting all other requirements  Future Appointments  No visits were found meeting these conditions.  Showing future appointments within next 90 days and meeting all other requirements

## 2025-04-16 ENCOUNTER — HOME CARE VISIT (OUTPATIENT)
Dept: HOME HEALTH SERVICES | Facility: HOME HEALTH | Age: 80
End: 2025-04-16
Payer: COMMERCIAL

## 2025-04-16 ENCOUNTER — PATIENT OUTREACH (OUTPATIENT)
Dept: CARE COORDINATION | Age: 80
End: 2025-04-16
Payer: COMMERCIAL

## 2025-04-16 VITALS
DIASTOLIC BLOOD PRESSURE: 62 MMHG | HEART RATE: 62 BPM | OXYGEN SATURATION: 96 % | RESPIRATION RATE: 18 BRPM | SYSTOLIC BLOOD PRESSURE: 110 MMHG | TEMPERATURE: 98.1 F

## 2025-04-16 VITALS
DIASTOLIC BLOOD PRESSURE: 64 MMHG | TEMPERATURE: 98.4 F | HEART RATE: 52 BPM | OXYGEN SATURATION: 98 % | SYSTOLIC BLOOD PRESSURE: 118 MMHG

## 2025-04-16 PROCEDURE — G0151 HHCP-SERV OF PT,EA 15 MIN: HCPCS

## 2025-04-16 PROCEDURE — G0152 HHCP-SERV OF OT,EA 15 MIN: HCPCS

## 2025-04-16 SDOH — ECONOMIC STABILITY: HOUSING INSECURITY: TEMPORARY ARRANGEMENT: 1

## 2025-04-16 ASSESSMENT — ENCOUNTER SYMPTOMS
DEPRESSION: 0
OCCASIONAL FEELINGS OF UNSTEADINESS: 0
PAIN LOCATION: RIGHT KNEE
PAIN: 1
PAIN LOCATION - PAIN SEVERITY: 4/10
PAIN LOCATION: GENERALIZED
ARTHRALGIAS: 1
PAIN LOCATION: LEFT KNEE
LOSS OF SENSATION IN FEET: 1
PAIN LOCATION - PAIN QUALITY: DULL ACHE
PERSON REPORTING PAIN: PATIENT
PAIN LOCATION - RELIEVING FACTORS: TYLENOL
PAIN LOCATION - PAIN SEVERITY: 5/10
MUSCLE WEAKNESS: 1
SUBJECTIVE PAIN PROGRESSION: UNCHANGED
PAIN LOCATION - PAIN SEVERITY: 5/10
PERSON REPORTING PAIN: PATIENT
PAIN LOCATION - RELIEVING FACTORS: TYLENOL
PAIN: 1

## 2025-04-16 ASSESSMENT — ACTIVITIES OF DAILY LIVING (ADL)
AMBULATION_DISTANCE/DURATION_TOLERATED: 65 FT
AMBULATION ASSISTANCE: ONE PERSON
DRESSING_LB_CURRENT_FUNCTION: INDEPENDENT
GROOMING_WITHIN_DEFINED_LIMITS: 1
PREPARING MEALS: INDEPENDENT
TOILETING: INDEPENDENT
FEEDING_WITHIN_DEFINED_LIMITS: 1
TOILETING: 1
CURRENT_FUNCTION: STAND BY ASSIST
CURRENT_FUNCTION: ONE PERSON
AMBULATION ASSISTANCE ON FLAT SURFACES: 1
AMBULATION ASSISTANCE: STAND BY ASSIST

## 2025-04-16 NOTE — PROGRESS NOTES
Daily call completed patient is doing well today she had PT OT susan and will get a few sessions of PT home care nurse scheduled to see her tomorrow. Reviewed vitals in chart from PT today they ARE STABLE NO MEDICATION NEEDS QUESTIONS AND CONCERNS ADDRESSED

## 2025-04-17 ENCOUNTER — PATIENT OUTREACH (OUTPATIENT)
Dept: CARE COORDINATION | Age: 80
End: 2025-04-17
Payer: COMMERCIAL

## 2025-04-17 ENCOUNTER — HOME CARE VISIT (OUTPATIENT)
Dept: HOME HEALTH SERVICES | Facility: HOME HEALTH | Age: 80
End: 2025-04-17

## 2025-04-17 DIAGNOSIS — N18.4 CKD (CHRONIC KIDNEY DISEASE) STAGE 4, GFR 15-29 ML/MIN (MULTI): ICD-10-CM

## 2025-04-18 ENCOUNTER — HOME CARE VISIT (OUTPATIENT)
Dept: HOME HEALTH SERVICES | Facility: HOME HEALTH | Age: 80
End: 2025-04-18
Payer: COMMERCIAL

## 2025-04-18 ENCOUNTER — PATIENT OUTREACH (OUTPATIENT)
Dept: CARE COORDINATION | Age: 80
End: 2025-04-18
Payer: COMMERCIAL

## 2025-04-18 PROCEDURE — G0157 HHC PT ASSISTANT EA 15: HCPCS | Mod: CQ

## 2025-04-18 ASSESSMENT — ENCOUNTER SYMPTOMS
PAIN LOCATION: LEFT SHOULDER
PAIN LOCATION: RIGHT SHOULDER
PAIN LOCATION: LEFT KNEE
PAIN LOCATION: RIGHT KNEE
PAIN: 1
PAIN SEVERITY GOAL: 0/10
LOWEST PAIN SEVERITY IN PAST 24 HOURS: 4/10
SUBJECTIVE PAIN PROGRESSION: WAXING AND WANING
PERSON REPORTING PAIN: PATIENT
HIGHEST PAIN SEVERITY IN PAST 24 HOURS: 6/10

## 2025-04-18 NOTE — PROGRESS NOTES
Daily Call Note:   [facility] daily call complete.  Denies CP/SOB/ edema  Has not heard from CHW, secure chat sent to [facility]W  Pt berrios not have B/P cuff, and is not covered by insurance   Will request B/P cuff from [employer]   No medication refills needed at this time  All questions/ concerns addressed  Verified upcoming weekly [facility] visit     Pt Education:  POC   Barriers:   Topics for Daily Review:   Pt demonstrates clear understanding: Yes    Daily Weight:  There were no vitals filed for this visit.   Last 3 Weights:  Wt Readings from Last 7 Encounters:   04/14/25 96.2 kg (212 lb)   04/13/25 95.7 kg (211 lb)   04/09/25 105 kg (231 lb 7.7 oz)   04/14/25 96.2 kg (212 lb)   04/04/25 97.5 kg (215 lb)   03/24/25 99.5 kg (219 lb 6.4 oz)   02/11/25 101 kg (223 lb 2 oz)       Masimo Device: No   Masimo Clinical Impression:     Virtual Visits--Scheduled (Most Recent Date at Top)  Follow up Appointments  Recent Visits  Date Type Provider Dept   03/24/25 [facility]   Showing recent visits within past 30 days and meeting all other requirements  Future Appointments  No visits were found meeting these conditions.  Showing future appointments within next 90 days and meeting all other requirements       Frequency of RN Calls & Virtual Visits per Team Agreement: Healthy at Home Frequency: Daily    Medication issues Addressed (what was done):     Follow up appointments scheduled by [facility] Staff:   Referrals made by [facility] staff:

## 2025-04-19 ENCOUNTER — HOME CARE VISIT (OUTPATIENT)
Dept: HOME HEALTH SERVICES | Facility: HOME HEALTH | Age: 80
End: 2025-04-19
Payer: COMMERCIAL

## 2025-04-19 ENCOUNTER — PATIENT OUTREACH (OUTPATIENT)
Dept: CARE COORDINATION | Age: 80
End: 2025-04-19
Payer: COMMERCIAL

## 2025-04-19 NOTE — PROGRESS NOTES
Daily Call Note:     LVM.      Last 3 Weights:  Wt Readings from Last 7 Encounters:   04/14/25 96.2 kg (212 lb)   04/13/25 95.7 kg (211 lb)   04/09/25 105 kg (231 lb 7.7 oz)   04/14/25 96.2 kg (212 lb)   04/04/25 97.5 kg (215 lb)   03/24/25 99.5 kg (219 lb 6.4 oz)   02/11/25 101 kg (223 lb 2 oz)           Virtual Visits--Scheduled (Most Recent Date at Top)  Follow up Appointments  Recent Visits  Date Type Provider Dept   03/24/25 Office Visit DO Iván Torres [facility]   Showing recent visits within past 30 days and meeting all other requirements  Future Appointments  No visits were found meeting these conditions.  Showing future appointments within next 90 days and meeting all other requirements

## 2025-04-21 ENCOUNTER — PATIENT OUTREACH (OUTPATIENT)
Dept: CARE COORDINATION | Age: 80
End: 2025-04-21

## 2025-04-21 ENCOUNTER — APPOINTMENT (OUTPATIENT)
Dept: ORTHOPEDIC SURGERY | Facility: CLINIC | Age: 80
End: 2025-04-21
Payer: COMMERCIAL

## 2025-04-21 NOTE — PROGRESS NOTES
Daily Call Note: Weekly [facility] complete with DANIEL Tello. Patient states she is doing ok. She saw nephrology last week. She states they did not change any medications and she did not restart the Jardiance. She reports her blood sugar range has been 130's-150. Patient missed call from CHW, per documentation, she sent email with resources and left phone number for return call, I provided patient with phone number. Reviewed all upcoming appointments and scheduled next [facility] 4/30/25 at 1700, verbalized understanding. No other question at this time.     Pt Education: per POC  Topics for Daily Review: BP  Pt demonstrates clear understanding: Yes    Daily Weight:  There were no vitals filed for this visit.   Last 3 Weights:  Wt Readings from Last 7 Encounters:   04/14/25 96.2 kg (212 lb)   04/13/25 95.7 kg (211 lb)   04/09/25 105 kg (231 lb 7.7 oz)   04/14/25 96.2 kg (212 lb)   04/04/25 97.5 kg (215 lb)   03/24/25 99.5 kg (219 lb 6.4 oz)   02/11/25 101 kg (223 lb 2 oz)       Masimo Device: No   Masimo Clinical Impression: n/a    Virtual Visits--Scheduled (Most Recent Date at Top)  Follow up Appointments  Recent Visits  Date Type Provider Dept   03/24/25 [facility]   Showing recent visits within past 30 days and meeting all other requirements  Future Appointments  No visits were found meeting these conditions.  Showing future appointments within next 90 days and meeting all other requirements       Frequency of RN Calls & Virtual Visits per Team Agreement: Healthy at Home Frequency: Daily    Medication issues Addressed (what was done): see note    Follow up appointments scheduled by [facility] Staff: none  Referrals made by [facility] staff: none

## 2025-04-22 ENCOUNTER — TELEPHONE (OUTPATIENT)
Dept: INPATIENT UNIT | Facility: HOSPITAL | Age: 80
End: 2025-04-22
Payer: COMMERCIAL

## 2025-04-22 ENCOUNTER — HOME CARE VISIT (OUTPATIENT)
Dept: HOME HEALTH SERVICES | Facility: HOME HEALTH | Age: 80
End: 2025-04-22
Payer: COMMERCIAL

## 2025-04-22 ENCOUNTER — PATIENT OUTREACH (OUTPATIENT)
Dept: CARE COORDINATION | Age: 80
End: 2025-04-22
Payer: COMMERCIAL

## 2025-04-22 PROCEDURE — G0157 HHC PT ASSISTANT EA 15: HCPCS | Mod: CQ

## 2025-04-22 ASSESSMENT — ENCOUNTER SYMPTOMS
PAIN SEVERITY GOAL: 0/10
SUBJECTIVE PAIN PROGRESSION: WAXING AND WANING
PAIN LOCATION: LEFT KNEE
PERSON REPORTING PAIN: PATIENT
PAIN LOCATION: RIGHT KNEE
PAIN: 1
HIGHEST PAIN SEVERITY IN PAST 24 HOURS: 4/10
LOWEST PAIN SEVERITY IN PAST 24 HOURS: 1/10

## 2025-04-22 NOTE — SIGNIFICANT EVENT
Heart Failure Nurse Navigator Transition of Care Phone Call    The role of the HF nurse navigator is to (1) characterize risk profiles of patients with heart failure transitioning from [id number] after hospitalization, (2) recommend interventions to improve care and reduce risks of worse post-hospitalization outcomes.     Assessment  Call attempted to patient . Spoke with patient and obtained the following information.    HF Symptoms  Chest pain? No  Shortness of breath? none  Orthopnea? No  Paroxysmal nocturnal dyspnea? No  Edema? No  Weight gain >2lbs in 3 days or 5lbs in 1 week? No    Medications  Is the patient prescribed the following medications?  ARNi/ACEi/ARB:    04/22/25 1401   Follow Up Phone Call   Do you have questions about your home instructions? No   Did the nurse use a  to talk to you about your discharge instructions? No   Were you able to fill all of your prescriptions? Yes   Do you have questions about your medication instructions? No   Do you know your follow up appointments? Yes   If you had home services arranged have they begun? Yes   If you had equipment ordered for home, did it arrive? Not applicable   Do you know who to call with worsening symptoms? Yes   It is very important to us that we are sensitive to all of your needs and we are responsive to your complaints and concerns. Do you have any additional questions I can assist you with? No       BB: Carvedilol 6.25 mg BID  MRA: None  SGLT2i: empagliflozin 10 mg daily  Diuretic: Lasix 20mg daily  Is the patient adherent to prescribed medications? YES    Management    Is the patient obtaining daily weights? YES  If yes, current weight? 220  Is the patient following diet limitations (2-3g Na, fluid restriction)? YES  Does the patient have a  cardiology follow-up scheduled? YES  If yes, appointment details? future  If no, what is the reason? NA  Does the patient require HF education reinforcement? No  If yes, what was  discussed? AN    Recommendations/Comments:  NA

## 2025-04-22 NOTE — PROGRESS NOTES
Daily Call Note:     Patient with no needs, concerns, or issues  Unable to talk long because home therapy had just come   Next [facility] 4/30 @ 1700        Pt Education:   Barriers: none  Topics for Daily Review: POC  Pt demonstrates clear understanding: Yes    Daily Weight:  There were no vitals filed for this visit.   Last 3 Weights:  Wt Readings from Last 7 Encounters:   04/14/25 96.2 kg (212 lb)   04/13/25 95.7 kg (211 lb)   04/09/25 105 kg (231 lb 7.7 oz)   04/14/25 96.2 kg (212 lb)   04/04/25 97.5 kg (215 lb)   03/24/25 99.5 kg (219 lb 6.4 oz)   02/11/25 101 kg (223 lb 2 oz)       Masimo Device: No   Masimo Clinical Impression:     Virtual Visits--Scheduled (Most Recent Date at Top)  Follow up Appointments  Recent Visits  Date Type Provider Dept   03/24/25 [facility]   Showing recent visits within past 30 days and meeting all other requirements  Future Appointments  No visits were found meeting these conditions.  Showing future appointments within next 90 days and meeting all other requirements       Frequency of RN Calls & Virtual Visits per Team Agreement: Healthy at Home Frequency: Daily & weekly    Medication issues Addressed (what was done): none    Follow up appointments scheduled by [facility] Staff: yes  Referrals made by [facility] staff:

## 2025-04-23 ENCOUNTER — HOME CARE VISIT (OUTPATIENT)
Dept: HOME HEALTH SERVICES | Facility: HOME HEALTH | Age: 80
End: 2025-04-23
Payer: COMMERCIAL

## 2025-04-23 VITALS
OXYGEN SATURATION: 98 % | DIASTOLIC BLOOD PRESSURE: 76 MMHG | TEMPERATURE: 97.6 F | RESPIRATION RATE: 18 BRPM | SYSTOLIC BLOOD PRESSURE: 132 MMHG | HEART RATE: 77 BPM

## 2025-04-23 PROCEDURE — G0299 HHS/HOSPICE OF RN EA 15 MIN: HCPCS

## 2025-04-23 ASSESSMENT — ENCOUNTER SYMPTOMS
PERSON REPORTING PAIN: PATIENT
LOWEST PAIN SEVERITY IN PAST 24 HOURS: 2/10
HIGHEST PAIN SEVERITY IN PAST 24 HOURS: 7/10
PAIN: 1
PAIN LOCATION - PAIN SEVERITY: 4/10
PAIN SEVERITY GOAL: 0/10
PAIN LOCATION: RIGHT KNEE
APPETITE LEVEL: GOOD
PAIN LOCATION: LEFT KNEE
LOWER EXTREMITY EDEMA: 1
CHANGE IN APPETITE: UNCHANGED
PAIN LOCATION - PAIN SEVERITY: 4/10

## 2025-04-24 ENCOUNTER — PATIENT OUTREACH (OUTPATIENT)
Dept: CARE COORDINATION | Age: 80
End: 2025-04-24
Payer: COMMERCIAL

## 2025-04-24 ENCOUNTER — HOME CARE VISIT (OUTPATIENT)
Dept: HOME HEALTH SERVICES | Facility: HOME HEALTH | Age: 80
End: 2025-04-24
Payer: COMMERCIAL

## 2025-04-24 ENCOUNTER — TELEPHONE (OUTPATIENT)
Dept: CARE COORDINATION | Facility: CLINIC | Age: 80
End: 2025-04-24
Payer: COMMERCIAL

## 2025-04-24 PROCEDURE — G0157 HHC PT ASSISTANT EA 15: HCPCS | Mod: CQ

## 2025-04-24 ASSESSMENT — ENCOUNTER SYMPTOMS
PAIN LOCATION: RIGHT KNEE
PAIN LOCATION: LEFT KNEE
LOWEST PAIN SEVERITY IN PAST 24 HOURS: 0/10
PAIN: 1
SUBJECTIVE PAIN PROGRESSION: WAXING AND WANING
HIGHEST PAIN SEVERITY IN PAST 24 HOURS: 4/10
PAIN SEVERITY GOAL: 0/10
PERSON REPORTING PAIN: PATIENT
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
## 2025-04-24 NOTE — PROGRESS NOTES
Daily Call Note: Daily call complete.Patient states she is doing good. She would like to speak with a SW and states when she called the number back, it didn't work, it asked for a code and she does not have a code. I reviewed with her that an appointment is scheduled 4/28/25 with Ciara Sweeney LCSW, she states no one told her about it and she won't be home. Secure chat to Ciara Sweeney LCSW, to clarify. I also reached out via phone call to Keli RCUZ, she states she has left voice messages for patient and also emailed her resources and she will contact her again this afternoon. Next [facility] 4/30/25 at 1700.    No return response from TERRELL Sweeney LCSW.    Pt Education: per POC  Topics for Daily Review: blood sugar  Pt demonstrates clear understanding: Yes    Daily Weight:  There were no vitals filed for this visit.   Last 3 Weights:  Wt Readings from Last 7 Encounters:   04/14/25 96.2 kg (212 lb)   04/13/25 95.7 kg (211 lb)   04/09/25 105 kg (231 lb 7.7 oz)   04/14/25 96.2 kg (212 lb)   04/04/25 97.5 kg (215 lb)   03/24/25 99.5 kg (219 lb 6.4 oz)   02/11/25 101 kg (223 lb 2 oz)       Masimo Device: No   Masimo Clinical Impression: n/a    Virtual Visits--Scheduled (Most Recent Date at Top)  Follow up Appointments  Recent Visits  No visits were found meeting these conditions.  Showing recent visits within past 30 days and meeting all other requirements  Future Appointments  No visits were found meeting these conditions.  Showing future appointments within next 90 days and meeting all other requirements       Frequency of RN Calls & Virtual Visits per Team Agreement: Healthy at Home Frequency: Daily    Medication issues Addressed (what was done): none    Follow up appointments scheduled by [facility] Staff: none  Referrals made by [facility] staff: see note

## 2025-04-26 ENCOUNTER — APPOINTMENT (OUTPATIENT)
Dept: HOME HEALTH SERVICES | Facility: HOME HEALTH | Age: 80
End: 2025-04-26
Payer: COMMERCIAL

## 2025-04-26 ENCOUNTER — PATIENT OUTREACH (OUTPATIENT)
Dept: CARE COORDINATION | Age: 80
End: 2025-04-26
Payer: COMMERCIAL

## 2025-04-26 ENCOUNTER — HOME CARE VISIT (OUTPATIENT)
Dept: HOME HEALTH SERVICES | Facility: HOME HEALTH | Age: 80
End: 2025-04-26
Payer: COMMERCIAL

## 2025-04-28 ENCOUNTER — PATIENT OUTREACH (OUTPATIENT)
Dept: CARE COORDINATION | Age: 80
End: 2025-04-28
Payer: COMMERCIAL

## 2025-04-28 ENCOUNTER — PATIENT OUTREACH (OUTPATIENT)
Dept: CARE COORDINATION | Facility: CLINIC | Age: 80
End: 2025-04-28
Payer: COMMERCIAL

## 2025-04-28 NOTE — PROGRESS NOTES
Emailed resources to patient email address to [email or web address]     [facility]& [address] [place], [place] [id number] [phone]  M-F  8A-5P    [address] [place] [address]  [phone]

## 2025-04-29 ENCOUNTER — PATIENT OUTREACH (OUTPATIENT)
Dept: CARE COORDINATION | Age: 80
End: 2025-04-29
Payer: COMMERCIAL

## 2025-04-30 ENCOUNTER — PATIENT OUTREACH (OUTPATIENT)
Dept: CARE COORDINATION | Age: 80
End: 2025-04-30

## 2025-04-30 ENCOUNTER — APPOINTMENT (OUTPATIENT)
Dept: CARE COORDINATION | Age: 80
End: 2025-04-30
Payer: COMMERCIAL

## 2025-04-30 NOTE — PROGRESS NOTES
"Daily Call Note:   [facility] weekly visit w this RN,   Denies CP/ SOB  Reports doing \"good\"  Dose not have B/P cuff    Continues to hold Jardiance  Slight edema to ankles  Did not weigh self today, but states weight only fluctuates by one pound  SW visiting tomorrow pt  Continues w [facility]  Medications reviewed, no refills needed  Scheduled next week [facility] visit.    All questions/ concerns addressed   Changed call frequency to M/W/F    Pt Education:  POC  Barriers:   Topics for Daily Review:   Pt demonstrates clear understanding: Yes    Daily Weight:  There were no vitals filed for this visit.   Last 3 Weights:  Wt Readings from Last 7 Encounters:   04/14/25 96.2 kg (212 lb)   04/13/25 95.7 kg (211 lb)   04/09/25 105 kg (231 lb 7.7 oz)   04/14/25 96.2 kg (212 lb)   04/04/25 97.5 kg (215 lb)   03/24/25 99.5 kg (219 lb 6.4 oz)   02/11/25 101 kg (223 lb 2 oz)       Masimo Device: No   Masimo Clinical Impression:     Virtual Visits--Scheduled (Most Recent Date at Top)  Follow up Appointments  Recent Visits  No visits were found meeting these conditions.  Showing recent visits within past 30 days and meeting all other requirements  Future Appointments  No visits were found meeting these conditions.  Showing future appointments within next 90 days and meeting all other requirements       Frequency of RN Calls & Virtual Visits per Team Agreement: Healthy at Home Frequency: Bi-Weekly    Medication issues Addressed (what was done):     Follow up appointments scheduled by [facility] Staff:   Referrals made by [facility] staff:         "
no

## 2025-05-01 ENCOUNTER — HOME CARE VISIT (OUTPATIENT)
Dept: HOME HEALTH SERVICES | Facility: HOME HEALTH | Age: 80
End: 2025-05-01
Payer: COMMERCIAL

## 2025-05-01 VITALS
TEMPERATURE: 98.7 F | OXYGEN SATURATION: 100 % | SYSTOLIC BLOOD PRESSURE: 140 MMHG | DIASTOLIC BLOOD PRESSURE: 85 MMHG | HEART RATE: 51 BPM

## 2025-05-01 PROCEDURE — G0155 HHCP-SVS OF CSW,EA 15 MIN: HCPCS

## 2025-05-01 PROCEDURE — G0151 HHCP-SERV OF PT,EA 15 MIN: HCPCS

## 2025-05-01 ASSESSMENT — ACTIVITIES OF DAILY LIVING (ADL)
AMBULATION ASSISTANCE ON FLAT SURFACES: 1
GROOMING_REQUIRES_ASSISTANCE: 1
TOILETING_REQUIRES_ASSISTANCE: 1
SHOPPING_REQUIRES_ASSISTANCE: 1
AMBULATION_REQUIRES_ASSISTANCE: 1

## 2025-05-01 ASSESSMENT — ENCOUNTER SYMPTOMS
PERSON REPORTING PAIN: PATIENT
PAIN: 1
PAIN LOCATION - PAIN SEVERITY: 3/10
PAIN LOCATION: GENERALIZED

## 2025-05-02 ENCOUNTER — PATIENT OUTREACH (OUTPATIENT)
Dept: CARE COORDINATION | Age: 80
End: 2025-05-02
Payer: COMMERCIAL

## 2025-05-02 SDOH — SOCIAL STABILITY: SOCIAL NETWORK: HELP FROM FAMILY/FRIENDS: HER FRIEND AND HIS SON - FRIENDS IN THE CHURCH - CHILDREN

## 2025-05-05 ENCOUNTER — PATIENT OUTREACH (OUTPATIENT)
Dept: CARE COORDINATION | Age: 80
End: 2025-05-05
Payer: COMMERCIAL

## 2025-05-05 ENCOUNTER — APPOINTMENT (OUTPATIENT)
Dept: ORTHOPEDIC SURGERY | Facility: CLINIC | Age: 80
End: 2025-05-05
Payer: COMMERCIAL

## 2025-05-05 NOTE — PROGRESS NOTES
Daily call complete.  Pt states she is doing well.   Denies any new symptoms or concerns.  Denies need for med refills at this time.   Pt instructed to call in if anything new develops.   Aware of upcoming appts. Next [facility] 5/7@1700    Pt with questions about a script for a walker and bp cuff, Message sent to Janette   Pt Education: POC  Barriers: None  Topics for Daily Review: POC   Pt demonstrates clear understanding: Yes    Daily Weight:  There were no vitals filed for this visit.   Last 3 Weights:  Wt Readings from Last 7 Encounters:   04/14/25 96.2 kg (212 lb)   04/13/25 95.7 kg (211 lb)   04/09/25 105 kg (231 lb 7.7 oz)   04/14/25 96.2 kg (212 lb)   04/04/25 97.5 kg (215 lb)   03/24/25 99.5 kg (219 lb 6.4 oz)   02/11/25 101 kg (223 lb 2 oz)       Masimo Device: No   Masimo Clinical Impression:     Virtual Visits--Scheduled (Most Recent Date at Top)  Follow up Appointments  Recent Visits  No visits were found meeting these conditions.  Showing recent visits within past 30 days and meeting all other requirements  Future Appointments  No visits were found meeting these conditions.  Showing future appointments within next 90 days and meeting all other requirements       Frequency of RN Calls & Virtual Visits per Team Agreement: Healthy at Home Frequency: MWF & weekly    Medication issues Addressed (what was done):     Follow up appointments scheduled by [facility] Staff: yes   Referrals made by [facility] staff:

## 2025-05-06 ENCOUNTER — APPOINTMENT (OUTPATIENT)
Dept: CARDIOLOGY | Facility: CLINIC | Age: 80
End: 2025-05-06
Payer: COMMERCIAL

## 2025-05-06 ENCOUNTER — APPOINTMENT (OUTPATIENT)
Dept: RADIOLOGY | Facility: HOSPITAL | Age: 80
End: 2025-05-06
Payer: COMMERCIAL

## 2025-05-06 DIAGNOSIS — E11.40 TYPE 2 DIABETES MELLITUS WITH DIABETIC NEUROPATHY, WITHOUT LONG-TERM CURRENT USE OF INSULIN: ICD-10-CM

## 2025-05-06 DIAGNOSIS — I10 PRIMARY HYPERTENSION: Primary | ICD-10-CM

## 2025-05-06 DIAGNOSIS — Z86.718 HISTORY OF DVT (DEEP VEIN THROMBOSIS): ICD-10-CM

## 2025-05-07 ENCOUNTER — APPOINTMENT (OUTPATIENT)
Dept: CARE COORDINATION | Age: 80
End: 2025-05-07
Payer: COMMERCIAL

## 2025-05-07 ENCOUNTER — HOME CARE VISIT (OUTPATIENT)
Dept: HOME HEALTH SERVICES | Facility: HOME HEALTH | Age: 80
End: 2025-05-07
Payer: COMMERCIAL

## 2025-05-07 VITALS
RESPIRATION RATE: 18 BRPM | DIASTOLIC BLOOD PRESSURE: 69 MMHG | HEART RATE: 56 BPM | SYSTOLIC BLOOD PRESSURE: 159 MMHG | TEMPERATURE: 96.1 F

## 2025-05-07 PROCEDURE — G0300 HHS/HOSPICE OF LPN EA 15 MIN: HCPCS

## 2025-05-07 ASSESSMENT — ENCOUNTER SYMPTOMS
OCCASIONAL FEELINGS OF UNSTEADINESS: 0
LOSS OF SENSATION IN FEET: 0
DEPRESSION: 0
CHANGE IN APPETITE: UNCHANGED
PAIN: 1
PAIN LOCATION - PAIN QUALITY: ACHING
PERSON REPORTING PAIN: PATIENT
APPETITE LEVEL: GOOD
LOWER EXTREMITY EDEMA: 1

## 2025-05-07 ASSESSMENT — ACTIVITIES OF DAILY LIVING (ADL)
TOILETING: INDEPENDENT
BATHING ASSESSED: 1
DRESSING_LB_CURRENT_FUNCTION: INDEPENDENT
DRESSING_UB_CURRENT_FUNCTION: INDEPENDENT
BATHING_CURRENT_FUNCTION: INDEPENDENT
TOILETING: 1
FEEDING ASSESSED: 1
AMBULATION ASSISTANCE: INDEPENDENT
AMBULATION ASSISTANCE: 1
FEEDING: INDEPENDENT

## 2025-05-08 ENCOUNTER — HOSPITAL ENCOUNTER (OUTPATIENT)
Dept: RADIOLOGY | Facility: HOSPITAL | Age: 80
Discharge: HOME | End: 2025-05-08
Payer: COMMERCIAL

## 2025-05-08 ENCOUNTER — HOSPITAL ENCOUNTER (OUTPATIENT)
Dept: CARDIOLOGY | Facility: HOSPITAL | Age: 80
Discharge: HOME | End: 2025-05-08
Payer: COMMERCIAL

## 2025-05-08 DIAGNOSIS — R94.31 ECG ABNORMALITY: ICD-10-CM

## 2025-05-08 DIAGNOSIS — I50.9 HEART FAILURE, UNSPECIFIED: ICD-10-CM

## 2025-05-08 PROCEDURE — A9502 TC99M TETROFOSMIN: HCPCS | Performed by: NURSE PRACTITIONER

## 2025-05-08 PROCEDURE — 3430000001 HC RX 343 DIAGNOSTIC RADIOPHARMACEUTICALS: Performed by: NURSE PRACTITIONER

## 2025-05-08 PROCEDURE — 78452 HT MUSCLE IMAGE SPECT MULT: CPT

## 2025-05-08 PROCEDURE — 93017 CV STRESS TEST TRACING ONLY: CPT

## 2025-05-08 PROCEDURE — 2500000004 HC RX 250 GENERAL PHARMACY W/ HCPCS (ALT 636 FOR OP/ED): Performed by: NURSE PRACTITIONER

## 2025-05-08 RX ORDER — REGADENOSON 0.08 MG/ML
0.4 INJECTION, SOLUTION INTRAVENOUS ONCE
Status: COMPLETED | OUTPATIENT
Start: 2025-05-08 | End: 2025-05-08

## 2025-05-08 RX ADMIN — TETROFOSMIN 35.5 MILLICURIE: 0.23 INJECTION, POWDER, LYOPHILIZED, FOR SOLUTION INTRAVENOUS at 12:44

## 2025-05-08 RX ADMIN — REGADENOSON 0.4 MG: 0.08 INJECTION, SOLUTION INTRAVENOUS at 12:43

## 2025-05-08 RX ADMIN — TETROFOSMIN 9.9 MILLICURIE: 0.23 INJECTION, POWDER, LYOPHILIZED, FOR SOLUTION INTRAVENOUS at 11:50

## 2025-05-09 ENCOUNTER — PATIENT OUTREACH (OUTPATIENT)
Dept: CARE COORDINATION | Age: 80
End: 2025-05-09
Payer: COMMERCIAL

## 2025-05-09 NOTE — PROGRESS NOTES
Daily Call Note: Call complete. Patient states she is doing fine. She states she needs a refill soon of diovan, coreg and demadex. She also states that she has a Rx for a walker and has gone to 3 different places and can not get it filled. I rescheduled next [facility] tomorrow 5/10/25 at 0830 to discuss with provider and get needed refills, she verbalized understanding, no other questions at this time.     Pt Education: per POC  Barriers: mobility  Pt demonstrates clear understanding: Yes    Daily Weight:  There were no vitals filed for this visit.   Last 3 Weights:  Wt Readings from Last 7 Encounters:   04/14/25 96.2 kg (212 lb)   04/13/25 95.7 kg (211 lb)   04/09/25 105 kg (231 lb 7.7 oz)   04/14/25 96.2 kg (212 lb)   04/04/25 97.5 kg (215 lb)   03/24/25 99.5 kg (219 lb 6.4 oz)   02/11/25 101 kg (223 lb 2 oz)       Masimo Device: No   Masimo Clinical Impression: n/a    Virtual Visits--Scheduled (Most Recent Date at Top)  Follow up Appointments  Recent Visits  No visits were found meeting these conditions.  Showing recent visits within past 30 days and meeting all other requirements  Future Appointments  No visits were found meeting these conditions.  Showing future appointments within next 90 days and meeting all other requirements       Frequency of RN Calls & Virtual Visits per Team Agreement: Healthy at Home Frequency: M/W/F    Medication issues Addressed (what was done): see note    Follow up appointments scheduled by [facility] Staff: none  Referrals made by [facility] staff: none

## 2025-05-09 NOTE — PROGRESS NOTES
Healthy at Home Virtual Visit     Admission Date: 4/8  Discharge Date: 4/12  Discharging Facility:  [facility] Visit: 4    Brief summary of hospitalization or reason for referral:   79 year old female with PMHX of HTN, HLD, T2DM, CKD 3b, HX DVT and OA, she presented to [facility] ED on 4/8 with complaints of knee/join pain s/p injections in bilateral knees. She was admitted for acute on chronic CHF, chronic knee pain, lower extremity edema, and impaired mobility.  She was evaluated by PT, OT, and orthopedics due to recent steroid injections to bilateral knees.  X-rays were obtained with no acute findings.  DEEP mostly probably secondary to accelerated hypertension recommended therapy and pain management.  Weightbearing as tolerated. Repeat Echo showed LVEF 50%, she was diuresed. She was evaluated by both cardiology and neprhology inpatient. She was treated for UTI with IV Rocephin. Nephrology okay to discharge with Coreg 12.5 mg daily, valsartan 320 mg daily, torsemide 10 mg daily, BMP in 1 week. Hold Jardiance until seen by outpatient settings. Received 2 doses of Rocephin IV and therefore will not provide p.o. medications outpatient. Will need to follow-up with PCP for sensitivity of cultures.     Interval Subjective:   Doing well. She needs a few refills. She also mentions that she has tried taking her walker prescription to 3 different places and has not been able to get them filled. She denies chest pain, shortness of breath, or leg swelling.     BP: none, does not have a monitor   HR: none, does not have a monitor  SPO2: none, does not have a monitor  Glucose: 145  Weight: 225 lbs    Masimo: No  Oxygen: No    Interval or Pertinent Labs/Testing:  Lab Review:   HEMOGLOBIN A1c   Date/Time Value Ref Range Status   03/27/2025 08:13 AM 8.3 (H) <5.7 % of total Hgb Final     Comment:     For someone without known diabetes, a hemoglobin A1c  value of 6.5% or greater indicates that they may have   diabetes and  this should be confirmed with a follow-up   test.     For someone with known diabetes, a value <7% indicates   that their diabetes is well controlled and a value   greater than or equal to 7% indicates suboptimal   control. A1c targets should be individualized based on   duration of diabetes, age, comorbid conditions, and   other considerations.     Currently, no consensus exists regarding use of  hemoglobin A1c for diagnosis of diabetes for children.           Hemoglobin A1C   Date/Time Value Ref Range Status   03/15/2024 05:25 AM 8 (H) 4.3 - 5.6 % Final     Comment:     American Diabetes Association guidelines indicate that patients with HgbA1c in the range 5.7-6.4% are at increased risk for development of diabetes, and intervention by lifestyle modification may be beneficial. HgbA1c greater or equal to 6.5% is considered diagnostic of diabetes.   10/18/2023 08:09 AM 8.0 (H) see below % Final   06/14/2023 08:45 AM 8.6 (A) % Final     Comment:          Diagnosis of Diabetes-Adults   Non-Diabetic: < or = 5.6%   Increased risk for developing diabetes: 5.7-6.4%   Diagnostic of diabetes: > or = 6.5%  .       Monitoring of Diabetes                Age (y)     Therapeutic Goal (%)   Adults:          >18           <7.0   Pediatrics:    13-18           <7.5                   7-12           <8.0                   0- 6            7.5-8.5   American Diabetes Association. Diabetes Care 33(S1), Jan 2010.     02/15/2023 07:38 AM 8.0 (A) % Final     Comment:          Diagnosis of Diabetes-Adults   Non-Diabetic: < or = 5.6%   Increased risk for developing diabetes: 5.7-6.4%   Diagnostic of diabetes: > or = 6.5%  .       Monitoring of Diabetes                Age (y)     Therapeutic Goal (%)   Adults:          >18           <7.0   Pediatrics:    13-18           <7.5                   7-12           <8.0                   0- 6            7.5-8.5   American Diabetes Association. Diabetes Care 33(S1), Jan 2010.     07/01/2022 09:26 AM  8.8 (A) % Final     Comment:          Diagnosis of Diabetes-Adults   Non-Diabetic: < or = 5.6%   Increased risk for developing diabetes: 5.7-6.4%   Diagnostic of diabetes: > or = 6.5%  .       Monitoring of Diabetes                Age (y)     Therapeutic Goal (%)   Adults:          >18           <7.0   Pediatrics:    13-18           <7.5                   7-12           <8.0                   0- 6            7.5-8.5   American Diabetes Association. Diabetes Care 33(S1), Jan 2010.         Admission on 04/13/2025, Discharged on 04/13/2025   Component Date Value    WBC 04/13/2025 11.1     nRBC 04/13/2025 0.0     RBC 04/13/2025 3.41 (L)     Hemoglobin 04/13/2025 10.3 (L)     Hematocrit 04/13/2025 32.4 (L)     MCV 04/13/2025 95     MCH 04/13/2025 30.2     MCHC 04/13/2025 31.8 (L)     RDW 04/13/2025 13.2     Platelets 04/13/2025 252     Neutrophils % 04/13/2025 72.4     Immature Granulocytes %,* 04/13/2025 0.3     Lymphocytes % 04/13/2025 16.3     Monocytes % 04/13/2025 8.3     Eosinophils % 04/13/2025 2.3     Basophils % 04/13/2025 0.4     Neutrophils Absolute 04/13/2025 8.06 (H)     Immature Granulocytes Ab* 04/13/2025 0.03     Lymphocytes Absolute 04/13/2025 1.81     Monocytes Absolute 04/13/2025 0.92 (H)     Eosinophils Absolute 04/13/2025 0.26     Basophils Absolute 04/13/2025 0.05     Glucose 04/13/2025 162 (H)     Sodium 04/13/2025 139     Potassium 04/13/2025 4.6     Chloride 04/13/2025 102     Bicarbonate 04/13/2025 27     Anion Gap 04/13/2025 15     Urea Nitrogen 04/13/2025 60 (H)     Creatinine 04/13/2025 2.44 (H)     eGFR 04/13/2025 20 (L)     Calcium 04/13/2025 8.4 (L)     Albumin 04/13/2025 3.5     Alkaline Phosphatase 04/13/2025 64     Total Protein 04/13/2025 6.9     AST 04/13/2025 10     Bilirubin, Total 04/13/2025 0.3     ALT 04/13/2025 11     Magnesium 04/13/2025 2.65 (H)     BNP 04/13/2025 96     Ventricular Rate 04/13/2025 56     Atrial Rate 04/13/2025 56     [place] Interval 04/13/2025 198     QRS  Duration 04/13/2025 92     QT Interval 04/13/2025 462     QTC Calculation(Bazett) 04/13/2025 445     P Axis 04/13/2025 70     R [place] 04/13/2025 39     T [place] 04/13/2025 86     QRS Count 04/13/2025 9     Q Onset 04/13/2025 221     P Onset 04/13/2025 122     P Offset 04/13/2025 175     T Offset 04/13/2025 452     QTC [place] 04/13/2025 451     Ventricular Rate 04/13/2025 60     Atrial Rate 04/13/2025 60     [place] Interval 04/13/2025 208     QRS Duration 04/13/2025 82     QT Interval 04/13/2025 456     QTC Calculation(Bazett) 04/13/2025 456     P Axis 04/13/2025 64     R [place] 04/13/2025 48     T Axis 04/13/2025 90     QRS Count 04/13/2025 10     Q Onset 04/13/2025 221     P Onset 04/13/2025 117     P Offset 04/13/2025 177     T Offset 04/13/2025 449     QTC [place] 04/13/2025 456     Troponin I, High Sensiti* 04/13/2025 10     Troponin I, High Sensiti* 04/13/2025 13    Admission on 04/08/2025, Discharged on 04/12/2025   Component Date Value    Ventricular Rate 04/08/2025 79     Atrial Rate 04/08/2025 79     [place] Interval 04/08/2025 182     QRS Duration 04/08/2025 86     QT Interval 04/08/2025 394     QTC Calculation(Bazett) 04/08/2025 451     P Axis 04/08/2025 57     R Axis 04/08/2025 55     T Axis 04/08/2025 79     QRS Count 04/08/2025 12     Q Onset 04/08/2025 221     P Onset 04/08/2025 130     P Offset 04/08/2025 190     T Offset 04/08/2025 418     QTC [place] 04/08/2025 432     WBC 04/08/2025 11.0     nRBC 04/08/2025 0.0     RBC 04/08/2025 3.40 (L)     Hemoglobin 04/08/2025 10.5 (L)     Hematocrit 04/08/2025 32.3 (L)     MCV 04/08/2025 95     MCH 04/08/2025 30.9     MCHC 04/08/2025 32.5     RDW 04/08/2025 13.3     Platelets 04/08/2025 227     Neutrophils % 04/08/2025 71.6     Immature Granulocytes %,* 04/08/2025 0.4     Lymphocytes % 04/08/2025 15.8     Monocytes % 04/08/2025 9.3     Eosinophils % 04/08/2025 2.5     Basophils % 04/08/2025 0.4     Neutrophils Absolute 04/08/2025 7.90 (H)     Immature  Granulocytes Ab* 04/08/2025 0.04     Lymphocytes Absolute 04/08/2025 1.74     Monocytes Absolute 04/08/2025 1.02 (H)     Eosinophils Absolute 04/08/2025 0.28     Basophils Absolute 04/08/2025 0.04     Magnesium 04/08/2025 2.17     Glucose 04/08/2025 219 (H)     Sodium 04/08/2025 141     Potassium 04/08/2025 4.5     Chloride 04/08/2025 109 (H)     Bicarbonate 04/08/2025 26     Anion Gap 04/08/2025 11     Urea Nitrogen 04/08/2025 38 (H)     Creatinine 04/08/2025 1.62 (H)     eGFR 04/08/2025 32 (L)     Calcium 04/08/2025 8.7     Albumin 04/08/2025 3.5     Alkaline Phosphatase 04/08/2025 77     Total Protein 04/08/2025 6.9     AST 04/08/2025 11     Bilirubin, Total 04/08/2025 0.3     ALT 04/08/2025 11     Lactate 04/08/2025 1.1     Protime 04/08/2025 10.8     INR 04/08/2025 1.0     C-Reactive Protein 04/08/2025 7.92 (H)     Troponin I, High Sensiti* 04/08/2025 17 (H)     Troponin I, High Sensiti* 04/08/2025 18 (H)     BNP 04/08/2025 302 (H)     AV mn grad 04/09/2025 7     AV pk eagle 04/09/2025 1.79     LV Biplane EF 04/09/2025 47     LVOT diam 04/09/2025 2.00     MV E/A ratio 04/09/2025 0.65     Tricuspid annular plane * 04/09/2025 2.5     LA vol index A/L 04/09/2025 24.5     LV EF 04/09/2025 50     RV free wall pk S' 04/09/2025 13.30     LVIDd 04/09/2025 4.68     Aortic Valve Area by Con* 04/09/2025 1.98     AV pk grad 04/09/2025 13     Aortic Valve Area by Con* 04/09/2025 1.91     LV A4C EF 04/09/2025 47.3     WBC 04/09/2025 9.6     nRBC 04/09/2025 0.0     RBC 04/09/2025 3.28 (L)     Hemoglobin 04/09/2025 10.1 (L)     Hematocrit 04/09/2025 31.0 (L)     MCV 04/09/2025 95     MCH 04/09/2025 30.8     MCHC 04/09/2025 32.6     RDW 04/09/2025 13.2     Platelets 04/09/2025 211     Glucose 04/09/2025 151 (H)     Sodium 04/09/2025 141     Potassium 04/09/2025 4.1     Chloride 04/09/2025 108 (H)     Bicarbonate 04/09/2025 26     Anion Gap 04/09/2025 11     Urea Nitrogen 04/09/2025 34 (H)     Creatinine 04/09/2025 1.59 (H)      eGFR 04/09/2025 33 (L)     Calcium 04/09/2025 8.6     POCT Glucose 04/09/2025 148 (H)     POCT Glucose 04/09/2025 159 (H)     POCT Glucose 04/09/2025 118 (H)     Glucose 04/10/2025 156 (H)     Sodium 04/10/2025 140     Potassium 04/10/2025 4.4     Chloride 04/10/2025 104     Bicarbonate 04/10/2025 28     Anion Gap 04/10/2025 12     Urea Nitrogen 04/10/2025 40 (H)     Creatinine 04/10/2025 1.92 (H)     eGFR 04/10/2025 26 (L)     Calcium 04/10/2025 8.7     WBC 04/10/2025 8.2     nRBC 04/10/2025 0.0     RBC 04/10/2025 3.52 (L)     Hemoglobin 04/10/2025 10.9 (L)     Hematocrit 04/10/2025 32.4 (L)     MCV 04/10/2025 92     MCH 04/10/2025 31.0     MCHC 04/10/2025 33.6     RDW 04/10/2025 13.3     Platelets 04/10/2025 221     POCT Glucose 04/09/2025 259 (H)     POCT Glucose 04/10/2025 141 (H)     Color, Urine 04/10/2025 Light-Yellow     Appearance, Urine 04/10/2025 Turbid (N)     Specific Gravity, Urine 04/10/2025 1.008     pH, Urine 04/10/2025 7.0     Protein, Urine 04/10/2025 10 (TRACE)     Glucose, Urine 04/10/2025 500 (3+) (A)     Blood, Urine 04/10/2025 0.03 (TRACE) (A)     Ketones, Urine 04/10/2025 NEGATIVE     Bilirubin, Urine 04/10/2025 NEGATIVE     Urobilinogen, Urine 04/10/2025 Normal     Nitrite, Urine 04/10/2025 NEGATIVE     Leukocyte Esterase, Urine 04/10/2025 500 Tanja/uL (A)     Extra Tube 04/10/2025 Hold for add-ons.     POCT Glucose 04/10/2025 186 (H)     WBC, Urine 04/10/2025 11-20 (A)     RBC, Urine 04/10/2025 1-2     Mucus, Urine 04/10/2025 FEW     Urine Culture 04/10/2025 >=100,000 CFU/mL Escherichia coli (A)     Thyroid Stimulating Horm* 04/10/2025 2.73     Parathyroid Hormone, Int* 04/10/2025 123.2 (H)     Albumin, Urine Random 04/10/2025 120.1     Creatinine, Urine Random 04/10/2025 18.5 (L)     Albumin/Creatinine Ratio 04/10/2025 649.2     POCT Glucose 04/10/2025 207 (H)     Glucose 04/11/2025 167 (H)     Sodium 04/11/2025 140     Potassium 04/11/2025 4.4     Chloride 04/11/2025 103     Bicarbonate  04/11/2025 28     Anion Gap 04/11/2025 13     Urea Nitrogen 04/11/2025 52 (H)     Creatinine 04/11/2025 2.11 (H)     eGFR 04/11/2025 23 (L)     Calcium 04/11/2025 8.6     WBC 04/11/2025 8.2     nRBC 04/11/2025 0.0     RBC 04/11/2025 3.53 (L)     Hemoglobin 04/11/2025 10.7 (L)     Hematocrit 04/11/2025 32.4 (L)     MCV 04/11/2025 92     MCH 04/11/2025 30.3     MCHC 04/11/2025 33.0     RDW 04/11/2025 13.4     Platelets 04/11/2025 239     POCT Glucose 04/10/2025 211 (H)     POCT Glucose 04/11/2025 184 (H)     Extra Tube 04/11/2025 Hold for add-ons.     POCT Glucose 04/11/2025 188 (H)     POCT Glucose 04/11/2025 131 (H)     Glucose 04/12/2025 166 (H)     Sodium 04/12/2025 138     Potassium 04/12/2025 4.5     Chloride 04/12/2025 103     Bicarbonate 04/12/2025 27     Anion Gap 04/12/2025 13     Urea Nitrogen 04/12/2025 56 (H)     Creatinine 04/12/2025 2.16 (H)     eGFR 04/12/2025 23 (L)     Calcium 04/12/2025 8.5 (L)     WBC 04/12/2025 6.7     nRBC 04/12/2025 0.0     RBC 04/12/2025 3.46 (L)     Hemoglobin 04/12/2025 10.6 (L)     Hematocrit 04/12/2025 32.5 (L)     MCV 04/12/2025 94     MCH 04/12/2025 30.6     MCHC 04/12/2025 32.6     RDW 04/12/2025 13.3     Platelets 04/12/2025 228     POCT Glucose 04/11/2025 203 (H)     POCT Glucose 04/12/2025 158 (H)     POCT Glucose 04/12/2025 219 (H)     POCT Glucose 04/12/2025 115 (H)    Office Visit on 03/24/2025   Component Date Value    WHITE BLOOD CELL COUNT 03/27/2025 8.9     RED BLOOD CELL COUNT 03/27/2025 3.72 (L)     HEMOGLOBIN 03/27/2025 11.5 (L)     HEMATOCRIT 03/27/2025 35.2     MCV 03/27/2025 94.6     MCH 03/27/2025 30.9     MCHC 03/27/2025 32.7     RDW 03/27/2025 12.9     PLATELET COUNT 03/27/2025 219     MPV 03/27/2025 10.5     CHOLESTEROL, TOTAL 03/27/2025 134     HDL CHOLESTEROL 03/27/2025 61     TRIGLYCERIDES 03/27/2025 69     LDL-CHOLESTEROL 03/27/2025 58     CHOL/HDLC RATIO 03/27/2025 2.2     NON HDL CHOLESTEROL 03/27/2025 73     GLUCOSE 03/27/2025 152 (H)     UREA  NITROGEN (BUN) 03/27/2025 38 (H)     CREATININE 03/27/2025 1.86 (H)     EGFR 03/27/2025 27 (L)     SODIUM 03/27/2025 141     POTASSIUM 03/27/2025 4.7     CHLORIDE 03/27/2025 106     CARBON DIOXIDE 03/27/2025 27     ELECTROLYTE BALANCE 03/27/2025 8     CALCIUM 03/27/2025 8.9     PROTEIN, TOTAL 03/27/2025 7.2     ALBUMIN 03/27/2025 4.1     BILIRUBIN, TOTAL 03/27/2025 0.4     ALKALINE PHOSPHATASE 03/27/2025 82     AST 03/27/2025 11     ALT 03/27/2025 13     TSH W/REFLEX TO FT4 03/27/2025 3.13     VITAMIN D,25-OH,TOTAL,IA 03/27/2025 62     VITAMIN B12 03/27/2025 330     HEMOGLOBIN A1c 03/27/2025 8.3 (H)     eAG (mg/dL) 03/27/2025 192     eAG (mmol/L) 03/27/2025 10.6     CREATININE, RANDOM URINE 03/27/2025 40     ALBUMIN, URINE 03/27/2025 37.7     ALBUMIN/CREATININE RATIO* 03/27/2025 943 (H)         Social Determinants of Health:  Medication Finances:  Zero Cost/No Cost  Transportation:  Relies on daughter  Access to Food:  [employer]    Medications Issues/Refill:  Needs refills of valsartan, torsemide, and carvedilol    Assessment/Plan    Impaired mobility  OA  -analgesic PRN, recommending tylenol 2/2 renal dysfunction  -continue alendronate   -followed with ortho, previously underwent cortisone injections  -finished home health PT   -having trouble getting a walker, we will reach out to [facility] and see     HTN Urgency   New Onset HFpEF  -unfortunately BP monitor is not covered by her insurance   -LVEF on echo this admission 50%  -was diuresed inpatient- discharged with toresemide 10 mg daily   -c/w valsartan 320 mg daily, carvedilol 12.5 mg BID  -will follow up with cardiology and undergo Lexiscan   -will follow up with cardiology 5/16      HLD   -c/w ASA and statin      DEEP on CKD 3b  -saw nephrology while inpatient baseline Scr 1.2, GFR 45  -scr on 4/14 2.44, BUN 60  -avoid nephrotoxic medications  -continue to hold jardiance per nephrology      UTI  -resolved  -urine culture growing Ecoli  -completed course of IV  antibiotics while inpatient  -denies urinary sx      T2DM  -does have glucometer at home and checks sugars daily   -c/w glipizide XL 10 mg   -discontinued jardiance, will consider restarting once renal function improves.      Social Disparities  -pt states that she is close to losing her home, she also will need resources regarding utilities and food  -referral for CHW placed   -she did follow up with  regarding housing and is having home visit with      Upcoming Appointments:   5/16: cardiology  5/30: orthopedics  9/26: primary care    Telemedicine Visit:  Patient Location during Visit:  [place]  Visit Modality:  Telephone  Additional Visit Participants:  Patient and Marisol Diana RN  Patient/Proxy verbally consents to Evaluation and Treatment     GALINA Bell-CNP   [facility]y at [facility]

## 2025-05-10 ENCOUNTER — TELEMEDICINE CLINICAL SUPPORT (OUTPATIENT)
Dept: CARE COORDINATION | Age: 80
End: 2025-05-10
Payer: COMMERCIAL

## 2025-05-10 ENCOUNTER — PATIENT OUTREACH (OUTPATIENT)
Dept: CARE COORDINATION | Age: 80
End: 2025-05-10

## 2025-05-10 DIAGNOSIS — R26.2 AMBULATORY DYSFUNCTION: Primary | ICD-10-CM

## 2025-05-10 DIAGNOSIS — I10 PRIMARY HYPERTENSION: ICD-10-CM

## 2025-05-10 DIAGNOSIS — I50.33 ACUTE ON CHRONIC DIASTOLIC HEART FAILURE: ICD-10-CM

## 2025-05-10 DIAGNOSIS — I10 PRIMARY HYPERTENSION: Primary | ICD-10-CM

## 2025-05-10 DIAGNOSIS — N18.4 CKD (CHRONIC KIDNEY DISEASE) STAGE 4, GFR 15-29 ML/MIN (MULTI): ICD-10-CM

## 2025-05-10 DIAGNOSIS — E11.40 TYPE 2 DIABETES MELLITUS WITH DIABETIC NEUROPATHY, WITHOUT LONG-TERM CURRENT USE OF INSULIN: ICD-10-CM

## 2025-05-10 RX ORDER — CARVEDILOL 12.5 MG/1
12.5 TABLET ORAL 2 TIMES DAILY
Qty: 60 TABLET | Refills: 0 | Status: SHIPPED | OUTPATIENT
Start: 2025-05-10 | End: 2025-06-09

## 2025-05-10 RX ORDER — VALSARTAN 320 MG/1
320 TABLET ORAL DAILY
Qty: 30 TABLET | Refills: 0 | Status: SHIPPED | OUTPATIENT
Start: 2025-05-10 | End: 2025-06-09

## 2025-05-10 RX ORDER — TORSEMIDE 10 MG/1
10 TABLET ORAL DAILY
Qty: 30 TABLET | Refills: 0 | Status: SHIPPED | OUTPATIENT
Start: 2025-05-10 | End: 2025-06-09

## 2025-05-10 NOTE — PROGRESS NOTES
Daily Call Note:   [facility] weekly [facility] visit w odilon RN, and Matthew NP  Visit via phone   [facility] 4th visit   Denies CP/SOB /edema  Requesting B/P cuff, informed pt not covered via ins,  states she can  not afford, placed on Medish list  Requesting rollator, Janette aware, and processing order   Medications reviewed, needs several refills.  Matthew NP will refill.    All questions/ concerns addressed  Next weekly  [facility] scheduled    Pt Education:   Barriers:   Topics for Daily Review:   Pt demonstrates clear understanding: Yes    Daily Weight:  There were no vitals filed for this visit.   Last 3 Weights:  Wt Readings from Last 7 Encounters:   04/14/25 96.2 kg (212 lb)   04/13/25 95.7 kg (211 lb)   04/09/25 105 kg (231 lb 7.7 oz)   04/14/25 96.2 kg (212 lb)   04/04/25 97.5 kg (215 lb)   03/24/25 99.5 kg (219 lb 6.4 oz)   02/11/25 101 kg (223 lb 2 oz)       Masimo Device: No   Masimo Clinical Impression:     Virtual Visits--Scheduled (Most Recent Date at Top)  Follow up Appointments  Recent Visits  No visits were found meeting these conditions.  Showing recent visits within past 30 days and meeting all other requirements  Future Appointments  No visits were found meeting these conditions.  Showing future appointments within next 90 days and meeting all other requirements       Frequency of RN Calls & Virtual Visits per Team Agreement: Healthy at Home Frequency: Bi-Weekly    Medication issues Addressed (what was done):     Follow up appointments scheduled by [facility] Staff:   Referrals made by [facility] staff:

## 2025-05-12 ENCOUNTER — PATIENT OUTREACH (OUTPATIENT)
Dept: CARE COORDINATION | Age: 80
End: 2025-05-12
Payer: COMMERCIAL

## 2025-05-14 ENCOUNTER — HOME CARE VISIT (OUTPATIENT)
Dept: HOME HEALTH SERVICES | Facility: HOME HEALTH | Age: 80
End: 2025-05-14
Payer: COMMERCIAL

## 2025-05-14 ENCOUNTER — PATIENT OUTREACH (OUTPATIENT)
Dept: CARE COORDINATION | Age: 80
End: 2025-05-14
Payer: COMMERCIAL

## 2025-05-14 NOTE — PROGRESS NOTES
Daily Call Note:   [facility] daily call complete.  Denies CP/SOB/ edema  Order for Rollator sent to Nyla.  Nyla submitted to insurance for PAR.  Pt updated  No medication refills needed at this time  All questions/ concerns addressed  Verified upcoming weekly [facility] visit     Pt Education: POC  Barriers:   Topics for Daily Review:   Pt demonstrates clear understanding: Yes    Daily Weight:  There were no vitals filed for this visit.   Last 3 Weights:  Wt Readings from Last 7 Encounters:   04/14/25 96.2 kg (212 lb)   04/13/25 95.7 kg (211 lb)   04/09/25 105 kg (231 lb 7.7 oz)   04/14/25 96.2 kg (212 lb)   04/04/25 97.5 kg (215 lb)   03/24/25 99.5 kg (219 lb 6.4 oz)   02/11/25 101 kg (223 lb 2 oz)       Masimo Device: No   Masimo Clinical Impression:     Virtual Visits--Scheduled (Most Recent Date at Top)  Follow up Appointments  Recent Visits  No visits were found meeting these conditions.  Showing recent visits within past 30 days and meeting all other requirements  Future Appointments  No visits were found meeting these conditions.  Showing future appointments within next 90 days and meeting all other requirements       Frequency of RN Calls & Virtual Visits per Team Agreement: Healthy at Home Frequency: Bi-Weekly    Medication issues Addressed (what was done):     Follow up appointments scheduled by [facility] Staff:   Referrals made by [facility] staff:

## 2025-05-16 ENCOUNTER — APPOINTMENT (OUTPATIENT)
Dept: CARDIOLOGY | Facility: CLINIC | Age: 80
End: 2025-05-16
Payer: COMMERCIAL

## 2025-05-16 VITALS
SYSTOLIC BLOOD PRESSURE: 136 MMHG | DIASTOLIC BLOOD PRESSURE: 74 MMHG | WEIGHT: 228.6 LBS | HEIGHT: 68 IN | BODY MASS INDEX: 34.65 KG/M2 | HEART RATE: 62 BPM

## 2025-05-16 DIAGNOSIS — I10 PRIMARY HYPERTENSION: ICD-10-CM

## 2025-05-16 DIAGNOSIS — E78.2 MIXED HYPERLIPIDEMIA: ICD-10-CM

## 2025-05-16 DIAGNOSIS — Z78.9 NEVER SMOKED TOBACCO: ICD-10-CM

## 2025-05-16 DIAGNOSIS — R60.0 LEG EDEMA: ICD-10-CM

## 2025-05-16 DIAGNOSIS — N18.4 CKD (CHRONIC KIDNEY DISEASE) STAGE 4, GFR 15-29 ML/MIN (MULTI): ICD-10-CM

## 2025-05-16 PROBLEM — E66.09 CLASS 1 OBESITY DUE TO EXCESS CALORIES WITH SERIOUS COMORBIDITY AND BODY MASS INDEX (BMI) OF 33.0 TO 33.9 IN ADULT: Status: RESOLVED | Noted: 2025-02-12 | Resolved: 2025-05-16

## 2025-05-16 PROBLEM — E66.811 CLASS 1 OBESITY DUE TO EXCESS CALORIES WITH SERIOUS COMORBIDITY AND BODY MASS INDEX (BMI) OF 33.0 TO 33.9 IN ADULT: Status: RESOLVED | Noted: 2025-02-12 | Resolved: 2025-05-16

## 2025-05-16 PROCEDURE — 3075F SYST BP GE 130 - 139MM HG: CPT

## 2025-05-16 PROCEDURE — 99214 OFFICE O/P EST MOD 30 MIN: CPT

## 2025-05-16 PROCEDURE — 1159F MED LIST DOCD IN RCRD: CPT

## 2025-05-16 PROCEDURE — 3078F DIAST BP <80 MM HG: CPT

## 2025-05-16 RX ORDER — AMLODIPINE BESYLATE 5 MG/1
10 TABLET ORAL DAILY
Qty: 180 TABLET | Refills: 3 | Status: SHIPPED | OUTPATIENT
Start: 2025-05-16 | End: 2026-05-16

## 2025-05-16 RX ORDER — ACETAMINOPHEN 500 MG
500 TABLET ORAL EVERY 8 HOURS PRN
COMMUNITY

## 2025-05-16 RX ORDER — ACETAMINOPHEN 500 MG
TABLET ORAL
Qty: 1 KIT | Refills: 0 | Status: SHIPPED | OUTPATIENT
Start: 2025-05-16

## 2025-05-16 NOTE — PATIENT INSTRUCTIONS
Patient to follow up in 6 months with Dr. Arnold MD     Please STOP Valsartan   Please START Amlodipine 10mg once daily     Please follow up directly with Dr. Hunt as well.     Continue same medications and treatments.   Patient educated on proper medication use.   Patient educated on risk factor modification.   Please bring any lab results from other providers / physicians to your next appointment.     Please bring all medicines, vitamins, and herbal supplements with you when you come to the office.     Prescriptions will not be filled unless you are compliant with your follow up appointments or have a follow up appointment scheduled as per instruction of your physician. Refills should be requested at the time of your visit.    Jon KATE RN am scribing for and in the presence of Dr. Arnold MD

## 2025-05-16 NOTE — PROGRESS NOTES
Healthy at Home Virtual Visit     Admission Date: 4/8  Discharge Date: 4/12  Discharging Facility:  [facility] Visit: 5    Brief summary of hospitalization or reason for referral:   79 year old female with PMHX of HTN, HLD, T2DM, CKD 3b, HX DVT and OA, she presented to [facility] ED on 4/8 with complaints of knee/join pain s/p injections in bilateral knees. She was admitted for acute on chronic CHF, chronic knee pain, lower extremity edema, and impaired mobility.  She was evaluated by PT, OT, and orthopedics due to recent steroid injections to bilateral knees.  X-rays were obtained with no acute findings.  DEEP mostly probably secondary to accelerated hypertension recommended therapy and pain management.  Weightbearing as tolerated. Repeat Echo showed LVEF 50%, she was diuresed. She was evaluated by both cardiology and neprhology inpatient. She was treated for UTI with IV Rocephin. Nephrology okay to discharge with Coreg 12.5 mg daily, valsartan 320 mg daily, torsemide 10 mg daily, BMP in 1 week. Hold Jardiance until seen by outpatient settings. Received 2 doses of Rocephin IV and therefore will not provide p.o. medications outpatient. Will need to follow-up with PCP for sensitivity of cultures.        Interval Subjective:   Has not yet received her walker.    Cardiology visit yesterday   -valsartan stopped   -amlodipine started    States she is aware of upcoming lab work due.     BP: none, does not have a monitor   HR: none, does not have a monitor  SPO2: none, does not have a monitor  Glucose: 154  Weight: 224  Wt Readings from Last 3 Encounters:   05/16/25 104 kg (228 lb 9.6 oz)   04/14/25 96.2 kg (212 lb)   04/13/25 95.7 kg (211 lb)       Masimo: No  Oxygen: No    Interval or Pertinent Labs/Testing:  Lab Review:   HEMOGLOBIN A1c   Date/Time Value Ref Range Status   03/27/2025 08:13 AM 8.3 (H) <5.7 % of total Hgb Final     Comment:     For someone without known diabetes, a hemoglobin A1c  value of 6.5% or  greater indicates that they may have   diabetes and this should be confirmed with a follow-up   test.     For someone with known diabetes, a value <7% indicates   that their diabetes is well controlled and a value   greater than or equal to 7% indicates suboptimal   control. A1c targets should be individualized based on   duration of diabetes, age, comorbid conditions, and   other considerations.     Currently, no consensus exists regarding use of  hemoglobin A1c for diagnosis of diabetes for children.           Hemoglobin A1C   Date/Time Value Ref Range Status   03/15/2024 05:25 AM 8 (H) 4.3 - 5.6 % Final     Comment:     American Diabetes Association guidelines indicate that patients with HgbA1c in the range 5.7-6.4% are at increased risk for development of diabetes, and intervention by lifestyle modification may be beneficial. HgbA1c greater or equal to 6.5% is considered diagnostic of diabetes.   10/18/2023 08:09 AM 8.0 (H) see below % Final   06/14/2023 08:45 AM 8.6 (A) % Final     Comment:          Diagnosis of Diabetes-Adults   Non-Diabetic: < or = 5.6%   Increased risk for developing diabetes: 5.7-6.4%   Diagnostic of diabetes: > or = 6.5%  .       Monitoring of Diabetes                Age (y)     Therapeutic Goal (%)   Adults:          >18           <7.0   Pediatrics:    13-18           <7.5                   7-12           <8.0                   0- 6            7.5-8.5   American Diabetes Association. Diabetes Care 33(S1), Jan 2010.     02/15/2023 07:38 AM 8.0 (A) % Final     Comment:          Diagnosis of Diabetes-Adults   Non-Diabetic: < or = 5.6%   Increased risk for developing diabetes: 5.7-6.4%   Diagnostic of diabetes: > or = 6.5%  .       Monitoring of Diabetes                Age (y)     Therapeutic Goal (%)   Adults:          >18           <7.0   Pediatrics:    13-18           <7.5                   7-12           <8.0                   0- 6            7.5-8.5   American Diabetes Association.  Diabetes Care 33(S1), Jan 2010.     07/01/2022 09:26 AM 8.8 (A) % Final     Comment:          Diagnosis of Diabetes-Adults   Non-Diabetic: < or = 5.6%   Increased risk for developing diabetes: 5.7-6.4%   Diagnostic of diabetes: > or = 6.5%  .       Monitoring of Diabetes                Age (y)     Therapeutic Goal (%)   Adults:          >18           <7.0   Pediatrics:    13-18           <7.5                   7-12           <8.0                   0- 6            7.5-8.5   American Diabetes Association. Diabetes Care 33(S1), Jan 2010.         No results found for this or any previous visit (from the past 96 hours).      SDOH Concerns & Interventions:    Medications Issues/Refill:      Assessment/Plan    Impaired mobility  OA  -analgesic PRN, recommending tylenol 2/2 renal dysfunction  -continue alendronate   -followed with ortho, previously underwent cortisone injections  -finished home health PT   -having trouble getting a walker, we will reach out to Nyla and see     HTN Urgency   New Onset HFpEF  -unfortunately BP monitor is not covered by her insurance   -LVEF 50%  -toresemide 10 mg daily  carvedilol 12.5 mg BID  Cardiology visit yesterday   -valsartan stopped   -amlodipine started  -will follow up with cardiology and undergo Lexiscan   -will follow up with cardiology 5/16      HLD   -c/w ASA and statin      DEEP on CKD 3b  -saw nephrology while inpatient baseline Scr 1.2, GFR 45  -scr on 4/14 2.44, BUN 60  -avoid nephrotoxic medications  -continue to hold jardiance per nephrology      UTI  -resolved  -completed course of IV antibiotics while inpatient  -denies urinary sx      T2DM  -does have glucometer at home and checks sugars daily   -c/w glipizide XL 10 mg   -discontinued jardiance, will consider restarting once renal function improves.      Social Disparities  -pt states that she is close to losing her home, she also will need resources regarding utilities and food  -referral for CHW placed   -she did  follow up with  regarding housing and is having home visit with         Upcoming Appointments:       Telemedicine Visit:  Patient Location during Visit:  [place]  Visit Modality:  Telephone  Additional Visit Participants:  None  Patient/Proxy verbally consents to Evaluation and Treatment

## 2025-05-16 NOTE — PROGRESS NOTES
Chief complaint:   Chief Complaint   Patient presents with    Results     Pt here to go over echo, stress test and Us lower extremities.        History of Present Illness  Sarah Manzo is a 80 y.o. year old female patient with history of hypertension, hyperlipidemia, diabetes Mellitus on oral medications and chronic kidney disease. Recently seen with ortho service for pain injections in legs. Patient had neck surgery in 2021 and developed provoked DVT after.   Patient was initially referred by PCP to follow up on leg edema and hypertension.     Recently admitted with knee pain, but also was treated for new CKD-4 by Dr. Gilbert MD and is following closely outpatient.      Today patient states she is doing well, denies chest pain, shortness of breath. Denies syncope or near syncopal episodes. Denies palpitations, flutters or arrhythmias.       Social History[1]    Outpatient Medications:  Current Outpatient Medications   Medication Instructions    acetaminophen (TYLENOL) 500 mg, Every 8 hours PRN    alendronate (FOSAMAX) 70 mg, oral, Every 7 days, Take in the morning with a full glass of water, on an empty stomach, and do not take anything else by mouth or lie down for the next 30 min.    aspirin 81 mg, Nightly    carvedilol (COREG) 12.5 mg, oral, 2 times daily    ergocalciferol (VITAMIN D-2) 1,250 mcg, oral, Every 7 days    glipiZIDE XL (GLUCOTROL XL) 10 mg, oral, Daily    simvastatin (ZOCOR) 80 mg, oral, Nightly    torsemide (DEMADEX) 10 mg, oral, Daily    vit A/vit C/vit E/zinc/copper (PRESERVISION AREDS ORAL) Take by mouth.         Vitals:  Vitals:    05/16/25 1152   BP: 136/74   Pulse: 62       Physical Exam:  General: NAD, well-appearing  HEENT: moist mucous membranes, no jaundice  Neck: No JVD, no carotid bruit  Lungs: CTA tequila, no wheezing or rales  Cardiac: RRR, no murmurs  Abdomen: soft, non-tender, non-distended  Extremities: 2+ radial pulses, mild/1+ edema, palpable pulses  Skin: warm, dry, no  wound  Neurologic: AAOx3,  no focal deficits       Reviewed Study(s):    Echo 4/9/25:  1. The left ventricular systolic function is mildly decreased, with a visually estimated ejection fraction of 50%.   2. No regional wall motion abnormalities.   3. Spectral Doppler shows a Grade I (impaired relaxation pattern) of left ventricular diastolic filling with normal left atrial filling pressure.   4. There is normal right ventricular global systolic function.   5. Normal sized right ventricle.   6. There is no evidence of mitral valve stenosis.   7. Trace mitral valve regurgitation.   8. Trace to mild tricuspid regurgitation.   9. Aortic valve stenosis is not present.  10. There is moderately increased septal and moderately increased posterior left ventricular wall thickness.     MPL 5/8/25:  Normal Lexiscan Myoview cardiac perfusion stress test.  No evidence of ischemia or myocardial infarction by perfusion imaging.  Normal left ventricular systolic function, ejection fraction 61%.  Noninvasive risk stratification-low risk.  Resting blood pressure 229/74-clinical correlation advised.    Venous Duplex 4/8/25:  No evidence for DVT within the bilateral lower extremities.  No  significant change from prior ultrasound.    Lipid Panel 3/27/25:  TC- 134  HDL- 61  TG- 69  LDL- 58      Assessment/Plan   Diagnoses and all orders for this visit:  Mixed hyperlipidemia  Primary hypertension  BMI 34.0-34.9,adult  CKD (chronic kidney disease) stage 4, GFR 15-29 ml/min (Multi)  Leg edema  Never smoked tobacco      #Hypertension  #Leg Edema  #Remote history provoked DVT   #Stage IV Chronic Kidney Disease  -Nuclear Stress, Echo and Venous Duplex all reviewed today in clinic and acceptable.  -Patient feels well at baseline, I discussed with her that primary goal is to manage kidneys with Dr. Hunt. Will stop Valsartan and start Amlodipine 10mg Daily due to renal function. Patient will see Dr. Gilbert MD in two weeks again.  -Will provide  script for BP home cuff for patient    RTC 6 months     Ernie Barrett MD, Ph,D, FESC  Interventional Cardiologist - [facility] [place]  Director of Structural and Valvular Heart Intervention - [facility]     I,Jon Johnston RN   am scribing for, and in the presence of Dr. Arnold MD.    IDr. Arnold MD, personally performed the services described in the documentation as scribed by Jon Johnston RN   in my presence, and confirm it is both accurate and complete.      **Disclaimer: This note was dictated by speech recognition, and every effort has been made to prevent any error in transcription, however minor errors may be present**             [1]   Social History  Tobacco Use    Smoking status: Every Day     Current packs/day: 0.00     Types: Cigarettes     Last attempt to quit: 1985     Years since quittin.3    Smokeless tobacco: Never   Vaping Use    Vaping status: Never Used   Substance Use Topics    Alcohol use: Never    Drug use: Yes     Types: Other     Comment: CBD gummies

## 2025-05-17 ENCOUNTER — APPOINTMENT (OUTPATIENT)
Dept: CARE COORDINATION | Age: 80
End: 2025-05-17
Payer: COMMERCIAL

## 2025-05-17 ENCOUNTER — PATIENT OUTREACH (OUTPATIENT)
Dept: CARE COORDINATION | Age: 80
End: 2025-05-17

## 2025-05-17 VITALS — BODY MASS INDEX: 34.06 KG/M2 | WEIGHT: 224 LBS

## 2025-05-17 DIAGNOSIS — E11.40 TYPE 2 DIABETES MELLITUS WITH DIABETIC NEUROPATHY, WITHOUT LONG-TERM CURRENT USE OF INSULIN: Primary | ICD-10-CM

## 2025-05-17 NOTE — PROGRESS NOTES
Daily Call Note: [facility] weekly [facility] visit w odilon RN, and Dr Crowe   Visit via phone   [facility] 5th visit  Denies CP/SOB /edema    Pt to pay for rollator on Monday   Medications reviewed, no refills required  All questions/ concerns addressed  Will graduate from [facility] today, pt in agreeance     Pt Education:  POC   Barriers:   Topics for Daily Review:   Pt demonstrates clear understanding: Yes    Daily Weight:     Last 3 Weights:  Wt Readings from Last 7 Encounters:   05/17/25 102 kg (224 lb)   05/16/25 104 kg (228 lb 9.6 oz)   04/14/25 96.2 kg (212 lb)   04/13/25 95.7 kg (211 lb)   04/09/25 105 kg (231 lb 7.7 oz)   04/14/25 96.2 kg (212 lb)   04/04/25 97.5 kg (215 lb)       Masimo Device: No   Masimo Clinical Impression:     Virtual Visits--Scheduled (Most Recent Date at Top)  Follow up Appointments  Recent Visits  No visits were found meeting these conditions.  Showing recent visits within past 30 days and meeting all other requirements  Future Appointments  No visits were found meeting these conditions.  Showing future appointments within next 90 days and meeting all other requirements       Frequency of RN Calls & Virtual Visits per Team Agreement: Healthy at Home Frequency: Bi-Weekly    Medication issues Addressed (what was done):     Follow up appointments scheduled by [facility] Staff:   Referrals made by [facility] staff:

## 2025-05-19 ENCOUNTER — HOSPITAL ENCOUNTER (OUTPATIENT)
Dept: NEUROLOGY | Facility: HOSPITAL | Age: 80
Discharge: HOME | End: 2025-05-19
Payer: COMMERCIAL

## 2025-05-19 DIAGNOSIS — R20.0 LEFT UPPER EXTREMITY NUMBNESS: ICD-10-CM

## 2025-05-19 DIAGNOSIS — R29.898 WEAKNESS OF BOTH HANDS: ICD-10-CM

## 2025-05-19 PROCEDURE — 95886 MUSC TEST DONE W/N TEST COMP: CPT

## 2025-05-30 ENCOUNTER — APPOINTMENT (OUTPATIENT)
Dept: ORTHOPEDIC SURGERY | Facility: CLINIC | Age: 80
End: 2025-05-30
Payer: COMMERCIAL

## 2025-06-06 DIAGNOSIS — E11.40 TYPE 2 DIABETES MELLITUS WITH DIABETIC NEUROPATHY, WITHOUT LONG-TERM CURRENT USE OF INSULIN: ICD-10-CM

## 2025-06-06 RX ORDER — GLIPIZIDE 5 MG/1
5 TABLET, FILM COATED, EXTENDED RELEASE ORAL DAILY
Qty: 90 TABLET | Refills: 2 | OUTPATIENT
Start: 2025-06-06

## 2025-06-06 RX ORDER — GLIPIZIDE 10 MG/1
10 TABLET, FILM COATED, EXTENDED RELEASE ORAL DAILY
Qty: 90 TABLET | Refills: 2 | Status: SHIPPED | OUTPATIENT
Start: 2025-06-06

## 2025-06-09 DIAGNOSIS — N18.4 CKD (CHRONIC KIDNEY DISEASE) STAGE 4, GFR 15-29 ML/MIN (MULTI): ICD-10-CM

## 2025-06-09 DIAGNOSIS — I50.33 ACUTE ON CHRONIC DIASTOLIC HEART FAILURE: ICD-10-CM

## 2025-06-10 RX ORDER — CARVEDILOL 12.5 MG/1
12.5 TABLET ORAL 2 TIMES DAILY
Qty: 180 TABLET | Refills: 2 | Status: SHIPPED | OUTPATIENT
Start: 2025-06-10 | End: 2026-03-07

## 2025-06-11 ASSESSMENT — ACTIVITIES OF DAILY LIVING (ADL)
HOME_HEALTH_OASIS: 00
OASIS_M1830: 00

## 2025-09-26 ENCOUNTER — APPOINTMENT (OUTPATIENT)
Dept: PRIMARY CARE | Facility: CLINIC | Age: 80
End: 2025-09-26
Payer: COMMERCIAL

## 2025-11-21 ENCOUNTER — APPOINTMENT (OUTPATIENT)
Dept: CARDIOLOGY | Facility: CLINIC | Age: 80
End: 2025-11-21
Payer: COMMERCIAL

## 2026-03-26 ENCOUNTER — APPOINTMENT (OUTPATIENT)
Dept: PRIMARY CARE | Facility: CLINIC | Age: 81
End: 2026-03-26
Payer: COMMERCIAL